# Patient Record
Sex: FEMALE | Race: WHITE
[De-identification: names, ages, dates, MRNs, and addresses within clinical notes are randomized per-mention and may not be internally consistent; named-entity substitution may affect disease eponyms.]

---

## 2017-06-20 ENCOUNTER — HOSPITAL ENCOUNTER (OUTPATIENT)
Dept: HOSPITAL 74 - FRADUS-SUR | Age: 67
Discharge: HOME | End: 2017-06-20
Payer: COMMERCIAL

## 2017-06-20 DIAGNOSIS — D23.5: ICD-10-CM

## 2017-06-20 DIAGNOSIS — C77.3: Primary | ICD-10-CM

## 2017-06-20 PROCEDURE — BH47ZZZ ULTRASONOGRAPHY OF UPPER EXTREMITY: ICD-10-PCS | Performed by: SURGERY

## 2017-06-20 PROCEDURE — BH40ZZZ ULTRASONOGRAPHY OF RIGHT BREAST: ICD-10-PCS | Performed by: SURGERY

## 2017-06-20 PROCEDURE — A4648 IMPLANTABLE TISSUE MARKER: HCPCS

## 2017-06-20 PROCEDURE — 07B53ZX EXCISION OF RIGHT AXILLARY LYMPHATIC, PERCUTANEOUS APPROACH, DIAGNOSTIC: ICD-10-PCS | Performed by: SURGERY

## 2017-06-20 PROCEDURE — 0HBT0ZX EXCISION OF RIGHT BREAST, OPEN APPROACH, DIAGNOSTIC: ICD-10-PCS | Performed by: SURGERY

## 2017-06-20 NOTE — OP
DATE OF OPERATION:  06/20/2017

 

PREOPERATIVE DIAGNOSIS:  Right axillary adenopathy and right breast skin thickening

and discoloration.  

 

POSTOPERATIVE DIAGNOSIS:  Right axillary adenopathy and right breast skin thickening

and discoloration.  

 

PROCEDURE:  Right axillary node ultrasound guided core biopsy and right breast skin

punch biopsy, 5 mm.  

 

ATTENDING SURGEON:  Tasia Jaramillo M.D. 

 

ANESTHESIA:  Local.

 

ESTIMATED BLOOD LOSS:  Minimal.

 

COMPLICATIONS:  None.

 

DESCRIPTION OF PROCEDURE:  Patient was made aware of risks and benefits of the

procedure and consented.  She is placed in a supine position.  The right axilla was

approached first.  Under sterile conditions, with 1% lidocaine for local anesthesia,

a small nick was made in the skin.  Using a 13-gauge suction biopsy, the biopsy _____

lateral approach under ultrasound guidance.  Multiple cores were obtained and

submitted to pathology.  Likewise under ultrasound guidance, a clip was placed into

the biopsy region.  Well tolerated by patient.  Steri-Strip and a sterile bandage was

applied.  The right breast was then approached.  Under sterile conditions with 1%

lidocaine for local anesthesia, a 5-mm punch biopsy was performed and submitted to

pathology.  The incision was then closed with a horizontal mattress suture of 4-0

nylon.  Sterile dressing applied.  The patient tolerated the procedure well.  Will

contact her with the results.   

 

 

TASIA JARAMILLO M.D.

 

[id number]

DD: 06/20/2017 11:29

DT: 06/20/2017 22:59

Job #:  [id number]

## 2017-06-21 NOTE — PATH
Surgical Pathology Report



Patient Name:  ELINOR ROWLAND

Accession #:  

[facility]. Rec. #:  [id number]                                                        

   /Age/Gender:  1950 (Age: 66) / F

Account:  [id number]                                                          

             Location: [facility]

Taken:  2017

Received:  2017

Reported:  2017

Physicians:  GIANNI Mccann M.D.

  



Specimen(s) Received

A: RIGHT AXILLARY NODE CORE BIOPSY 

B: RIGHT BREAST SKIN PUNCH BIOPSY 





Clinical History

Palpable mass

Highly suspicious







Final Diagnosis

A. LYMPH NODE, RIGHT AXILLARY, NEEDLE CORE BIOPSY:  

FOCAL INVOLVEMENT BY POORLY DIFFERENTIATED METASTATIC CARCINOMA, APPROXIMATELY 2

MM IN GREATEST DIMENSION AS MEASURED ON THE SLIDE.



B. SKIN, RIGHT BREAST, PUNCH BIOPSY:  

DERMAL LYMPH-VASCULAR TUMOR EMBOLUS, MEASURING LESS THAN 1 MM IN GREATEST

DIMENSION AS MEASURED ON THE SLIDE.



Comment: This case was discussed with Dr. Sánchez on 2017.

Immunohistochemical stains are pending, and a report will follow.







***Electronically Signed***

Geovany Ivy M.D.



Addendum     

Reported: 2017



Addendum Diagnosis

Results of Estrogen Receptor (ER) and Progesterone Receptor (ND) studies

performed on block "B" at [facility] are as follows:

ER (clone [id number] mouse monoclonal antibody by [employer]):  0% nuclear staining

(Negative).

ND (clone16 mouse monoclonal antibody by [employer]) : 0% nuclear staining

(/Negative).



Results of Her2 (IHC) & Ki-67 and RODNEY-3 studies performed on block "B" at

[place], [place] ([id number]) are as follows:

Her2 IHC (EP3 from [employer], formerly known as [id number], using Bond Polymer Refine

detection kit): 1+ Negative

Ki-67: ~25% (Intermediate proliferative index)

RODNEY-3: Positive (consistent with breast origin).



Positive and negative controls (internal if applicable) show appropriate

results.

Formalin fixation and cold ischemic times are within current ASCO/CAP

recommendations for ER, ND and Her2 testing.







 Geovany Ivy M.D.  

 



Gross Description

A. Received in formalin labeled "right axillary lymph node," is a 2.2 x 2.1 x

0.3 cm aggregate of multiple tan-yellow, irregular to cylindrical portions of

fibroadipose tissue admixed with blood clot. The specimen is entirely submitted

in one cassette.



B. Received in formalin labeled "right breast skin punch," is a 0.4 cm in

diameter tan, circular skin punch biopsy excised to depth of 0.6 cm. The

epidermal surface is grossly unremarkable. The specimen is submitted in toto in

one cassette.



Total formalin fixation time: Approximately 6 hours.

## 2017-06-22 ENCOUNTER — HOSPITAL ENCOUNTER (OUTPATIENT)
Dept: HOSPITAL 74 - FMAMMOTONE | Age: 67
Discharge: HOME | End: 2017-06-22
Payer: COMMERCIAL

## 2017-06-22 ENCOUNTER — HOSPITAL ENCOUNTER (EMERGENCY)
Dept: HOSPITAL 74 - FER | Age: 67
Discharge: HOME | End: 2017-06-22
Payer: COMMERCIAL

## 2017-06-22 VITALS — BODY MASS INDEX: 27.6 KG/M2

## 2017-06-22 VITALS — HEART RATE: 76 BPM | DIASTOLIC BLOOD PRESSURE: 62 MMHG | SYSTOLIC BLOOD PRESSURE: 108 MMHG

## 2017-06-22 VITALS — TEMPERATURE: 98.1 F

## 2017-06-22 DIAGNOSIS — N64.1: ICD-10-CM

## 2017-06-22 DIAGNOSIS — R55: Primary | ICD-10-CM

## 2017-06-22 DIAGNOSIS — C50.311: Primary | ICD-10-CM

## 2017-06-22 DIAGNOSIS — N64.89: ICD-10-CM

## 2017-06-22 DIAGNOSIS — R92.1: ICD-10-CM

## 2017-06-22 DIAGNOSIS — D05.01: ICD-10-CM

## 2017-06-22 DIAGNOSIS — Z98.890: ICD-10-CM

## 2017-06-22 LAB
ALBUMIN SERPL-MCNC: 3.9 G/DL (ref 3.5–5)
ALP SERPL-CCNC: 87 U/L (ref 32–92)
ALT SERPL-CCNC: 14 U/L (ref 10–40)
ANION GAP SERPL CALC-SCNC: 11 MMOL/L (ref 8–16)
AST SERPL-CCNC: 19 U/L (ref 10–42)
BASOPHILS # BLD: 0.8 % (ref 0–2)
BILIRUB SERPL-MCNC: 0.3 MG/DL (ref 0.2–1)
CALCIUM SERPL-MCNC: 9.1 MG/DL (ref 8.4–10.2)
CK SERPL-CCNC: 67 IU/L (ref 26–140)
CO2 SERPL-SCNC: 23 MMOL/L (ref 22–28)
CREAT SERPL-MCNC: 0.6 MG/DL (ref 0.6–1.3)
DEPRECATED RDW RBC AUTO: 13.3 % (ref 11.6–15.6)
EOSINOPHIL # BLD: 3.7 % (ref 0–4.5)
GLUCOSE SERPL-MCNC: 115 MG/DL (ref 74–106)
MCH RBC QN AUTO: 29.9 PG (ref 25.7–33.7)
MCHC RBC AUTO-ENTMCNC: 33.9 G/DL (ref 32–36)
MCV RBC: 88.1 FL (ref 80–96)
NEUTROPHILS # BLD: 65.4 % (ref 42.8–82.8)
PLATELET # BLD AUTO: 278 K/MM3 (ref 134–434)
PMV BLD: 8.2 FL (ref 7.5–11.1)
PROT SERPL-MCNC: 7.2 G/DL (ref 6.4–8.3)
TROPONIN I SERPL-MCNC: < 0.03 NG/ML (ref 0.03–0.5)
WBC # BLD AUTO: 9.1 K/MM3 (ref 4–10.8)

## 2017-06-22 PROCEDURE — A4648 IMPLANTABLE TISSUE MARKER: HCPCS

## 2017-06-22 PROCEDURE — 0HBT3ZX EXCISION OF RIGHT BREAST, PERCUTANEOUS APPROACH, DIAGNOSTIC: ICD-10-PCS

## 2017-06-22 PROCEDURE — 3E0337Z INTRODUCTION OF ELECTROLYTIC AND WATER BALANCE SUBSTANCE INTO PERIPHERAL VEIN, PERCUTANEOUS APPROACH: ICD-10-PCS

## 2017-06-22 NOTE — PDOC
History of Present Illness





- General


Chief Complaint: Lightheaded


Stated Complaint: LIGHTHEADED


Time Seen by Provider: 06/22/17 12:05


History Source: Patient


Exam Limitations: No Limitations





- History of Present Illness


Initial Comments: 


06/22/17 12:08


65 y/o female had a stereotactic biopsy of the breast today that bleed a little 

and when she left the office she became light headed and dizzy. Rapid response 

team was called. She never fell or syncopized. Denies chest pain or SOB. No 

fever or chills. No numbness or headaches or blurred vision. Feeling better in 

the ER.


Did not eat anything all day due to surgical procedure.


Presenting Symptoms: Dizziness, Near-Syncope


Timing/Duration: reports: resolved prior to arrival


Severity/Quality: reports: mild


Location: denies: substernal, central, epigastric, shoulder, back, abdomen, 

other


Chest Pain Radiation: denies: no radiation


Activities at Onset: denies: none, exertion, emotional upset, rest, sleep, no 

specific activity, eating, working, sexual intercourse, other





Past History





- Past Medical History


Allergies/Adverse Reactions: 


 Allergies











Allergy/AdvReac Type Severity Reaction Status Date / Time


 


No Known Allergies Allergy   Verified 06/22/17 12:18











Home Medications: 


Ambulatory Orders





Multivitamins [Tab-A-Vit -] 1 tab PO DAILY 06/22/17 


Naproxen Sodium [Aleve] 220 mg PO ASDIR PRN 06/22/17 











**Review of Systems





- Review of Systems


Able to Perform ROS?: Yes


Is the patient limited English proficient: No


Constitutional: Yes: Weakness.  No: Chills, Fever


HEENTM: No: Blurred Vision, Double Vision


Respiratory: No: Cough, Shortness of Breath


Cardiac (ROS): No: Chest Pain, Irregular Heart Rate


ABD/GI: No: Nausea, Vomiting


Musculoskeletal: No: Back Pain


All Other Systems: Reviewed and Negative





*Physical Exam





- Vital Signs


 Last Vital Signs











Temp Pulse Resp BP Pulse Ox


 


 98.1 F   79   18   105/65   98 


 


 06/22/17 12:00  06/22/17 12:46  06/22/17 12:46  06/22/17 12:46  06/22/17 12:46














- Physical Exam


General Appearance: Yes: Appropriately Dressed.  No: Apparent Distress


HEENT: positive: EOMI, RODRIGUEZ, Normal ENT Inspection


Neck: positive: Trachea midline, Normal Thyroid, Supple


Respiratory/Chest: positive: Lungs Clear, Normal Breath Sounds, Other (right 

breast bandaged, no active bleeding).  negative: Chest Tender, Respiratory 

Distress


Cardiovascular: positive: Regular Rhythm, Regular Rate, S1, S2.  negative: Edema

, JVD, Murmur


Vascular Pulses: Femoral (R): 4+, Femoral (L): 4+, Carotid (R): 4+, Carotid (L)

: 4+, Dorsalis-Pedis (R): 4+, Doralis-Pedis (L): 4+


Gastrointestinal/Abdominal: positive: Normal Bowel Sounds, Flat, Soft.  negative

: Tender, Organomegaly, Pulsatile Mass


Lymphatic: negative: Adenopathy, Tenderness, Other


Musculoskeletal: positive: Normal Inspection.  negative: CVA Tenderness


Extremity: positive: Normal Capillary Refill, Normal Inspection, Normal Range 

of Motion


Integumentary: positive: Normal Color, Dry, Warm


Neurologic: positive: CNs II-XII NML intact, Fully Oriented, Alert, Normal Mood/

Affect (strength 5+/5 b/l in UE and LE, no focal deficits noted), Normal 

Response, Motor Strength 5/5





**Heart Score/ECG Review





- History


History: Slightly suspicious





- Electrocardiogram


EKG: Normal





- Age


Age: 45-65





- Risk Factors


Risk Factors Heart Score: No Hx Hypercholesterolemia, No Hx Hypertension, No Hx 

Diabetes, No Smoking History, No Positive family hx of cardiac disease, No Hx 

Obesity


Based on the list above the patient has:: No risk factors known





- Troponin


Troponin: </= normal limit





- Score


Heart Score - Total: 1





- ECG Intrepretation


Rhythm: Regular Rhythm


Comment:: 





06/22/17 12:15


HR 69 no STEMI





- Axis


Axis: Normal





- ST and T


Non Specific ST-T Wave changes: No





- ECG Impressions


Normal ECG: Yes





ED Treatment Course





- LABORATORY


CBC & Chemistry Diagram: 


 06/22/17 12:15





 06/22/17 12:12





- ADDITIONAL ORDERS


Additional order review: 


 Laboratory  Results











  06/22/17 06/22/17





  12:12 12:12


 


Sodium   138


 


Potassium   3.8


 


Chloride   104


 


Carbon Dioxide   23


 


Anion Gap   11


 


BUN   17


 


Creatinine   0.6


 


Creat Clearance w eGFR   > 60


 


Random Glucose   115 H


 


Calcium   9.1


 


Total Bilirubin   0.3


 


AST   19


 


ALT   14


 


Alkaline Phosphatase   87


 


Troponin I  < 0.03 L 


 


Total Protein   7.2


 


Albumin   3.9








 











  06/22/17





  12:15


 


RBC  4.36


 


MCV  88.1


 


MCHC  33.9


 


RDW  13.3


 


MPV  8.2


 


Neutrophils %  65.4


 


Lymphocytes %  22.1


 


Monocytes %  8.0


 


Eosinophils %  3.7


 


Basophils %  0.8














Progress Note





- Progress Note


Progress Note: 


Pt appears to have a near syncopal episode, now resolved with fluids.


Pt is feeling much better, vitals stable


Will discharge home





*DC/Admit/Observation/Transfer


Diagnosis at time of Disposition: 


 Vasovagal near syncope





- Discharge Dispostion


Disposition: HOME


Condition at time of disposition: Good


Admit: No





- Patient Instructions


Printed Discharge Instructions:  DI for Syncope in Adults (Fainting)


Additional Instructions: 


Fluids, rest, Tylenol


If worsen return to ER

## 2017-06-23 NOTE — PATH
Surgical Pathology Report



Patient Name:  ELINOR ROWLAND

Accession #:  

[facility]. Rec. #:  [id number]                                                        

   /Age/Gender:  1950 (Age: 66) / F

Account:  [id number]                                                          

             Location: [facility]

Taken:  2017

Received:  2017

Reported:  2017

Physicians:  GIANNI Steve M.D.

  



Specimen(s) Received

A: RIGHT BREAST CORE BIOPSY WITH CALCIFICATIONS 

B: RIGHT BREAST CORE BIOPSY WITHOUT CALCIFICATIONS 





Clinical History

Microcalcifications, suspicious







Final Diagnosis

A, B. BREAST, RIGHT, WITH AND WITHOUT CALCIFICATIONS, STEREOTACTIC CORE BIOPSY

INVASIVE MAMMARY CARCINOMA, NUCLEAR GRADE 2, MOST CONSISTENT WITH LOBULAR

PHENOTYPE (SEE COMMENT).

LOBULAR CARCINOMA IN SITU (LCIS), NUCLEAR GRADE 2, WITH FOCAL CENTRAL NECROSIS

AND CALCIFICATIONS.

FOCI OF LYMPHOVASCULAR INVASION IDENTIFIED.



Comment: The greatest extent of invasive carcinoma in one core is 0.5 cm. 

Immunohistochemical stain for E-cadherin performed and interpreted at [facility] on block A1 shows the following: invasive and in-situ

carcinoma cells show negative membranous staining and weak cytoplasmic

reactivity with E-cadherin, the pattern most compatible with lobular phenotype. 

Additional immunohistochemical stains for [id number] and CK-HMW are pending;

results will be reported in an addendum. 



Results of Estrogen Receptor (ER) and Progesterone Receptor (MS) studies

performed on block A1 at [facility] are as follows (in

invasive carcinoma):

ER (clone 6F11 mouse monoclonal antibody by [employer]): 0% nuclear staining

(Negative).

MS (clone16 mouse monoclonal antibody by [employer]): 0% nuclear staining (Negative).



Results of Her2 and Ki67 studies will be reported separately in an addendum.



Positive and negative controls (internal if applicable) show appropriate

results.

Formalin fixation and cold ischemic times are within current ASCO/CAP

recommendations for ER, MS and Her2 testing.





***Electronically Signed***

Alexander Finkelstein, M.D.



Addendum     

Reported: 2017



Addendum Diagnosis

Additional immunohistochemical stains for [id number] and CK-HMW performed at

[place], [place] ([id number]) and interpreted as [facility] show the following: the cells of invasive and in situ carcinoma show

primarily cytoplasmic staining with [id number] and are positive for CK-HMW.

The results are supportive of lobular phenotype.



Results of Her2 (IHC) & Ki-67 studies performed on block A1 at [place], [place] ([id number]) are as follows:

Her2 IHC (EP3 from [employer], formerly known as [id number], using Bond Polymer Refine

detection kit): 1+ (Negative)

Ki-67: ~20% (Intermediate proliferation index)



Positive and negative controls (internal if applicable) show appropriate

results.





 Alexander Finkelstein, M.D.  

 



Gross Description

A.  Received in formalin, labeled "right breast with calcifications" are 7

tan-yellow, cylindrical portions of fibroadipose tissue ranging from 0.9-3.0 cm

in length and averaging 0.2 cm. in diameter. The specimen is submitted in toto

in one cassette. 



B. Received in formalin labeled "right breast without calcifications" is a 1.7 x

0.9 x 0.3 cm aggregate of multiple tan-yellow, air radial to cylindrical

portions of fibroadipose tissue. The formalin is filtered and the specimen is

entirely submitted in one cassette.



Time to formalin fixation: 5 minutes

Total formalin fixation time: Approximately 6 hours.

## 2017-06-23 NOTE — EKG
Test Reason : 

Blood Pressure : ***/*** mmHG

Vent. Rate : 069 BPM     Atrial Rate : 069 BPM

   P-R Int : 152 ms          QRS Dur : 078 ms

    QT Int : 412 ms       P-R-T Axes : 065 045 055 degrees

   QTc Int : 441 ms

 

NORMAL SINUS RHYTHM

POSSIBLE LEFT ATRIAL ENLARGEMENT

WHEN COMPARED WITH ECG OF 17-AUG-1998 08:53,

NO SIGNIFICANT CHANGE WAS FOUND

Confirmed by MD PINTO MARJORY ([id number]) on 6/23/2017 6:18:06 PM

 

Referred By: BRADLY COLÓN           Confirmed By:CYNTHIA PINTO MD

## 2017-08-30 ENCOUNTER — HOSPITAL ENCOUNTER (OUTPATIENT)
Dept: HOSPITAL 74 - JRADIR | Age: 67
Discharge: HOME | End: 2017-08-30
Payer: COMMERCIAL

## 2017-08-30 VITALS — DIASTOLIC BLOOD PRESSURE: 70 MMHG | HEART RATE: 80 BPM | SYSTOLIC BLOOD PRESSURE: 126 MMHG

## 2017-08-30 VITALS — BODY MASS INDEX: 27.6 KG/M2

## 2017-08-30 VITALS — TEMPERATURE: 98.6 F

## 2017-08-30 DIAGNOSIS — C50.919: Primary | ICD-10-CM

## 2017-08-30 LAB
BASOPHILS # BLD: 0.6 % (ref 0–2)
DEPRECATED RDW RBC AUTO: 14.3 % (ref 11.6–15.6)
EOSINOPHIL # BLD: 0 % (ref 0–4.5)
INR BLD: 1.16 (ref 0.82–1.09)
MCH RBC QN AUTO: 27.7 PG (ref 25.7–33.7)
MCHC RBC AUTO-ENTMCNC: 32.4 G/DL (ref 32–36)
MCV RBC: 85.6 FL (ref 80–96)
NEUTROPHILS # BLD: 81.7 % (ref 42.8–82.8)
PLATELET # BLD AUTO: 326 K/MM3 (ref 134–434)
PMV BLD: 7.3 FL (ref 7.5–11.1)
PT PNL PPP: 12.8 SEC (ref 9.98–11.88)
WBC # BLD AUTO: 9 K/MM3 (ref 4–10)

## 2017-08-30 PROCEDURE — 36561 INSERT TUNNELED CV CATH: CPT

## 2017-08-30 PROCEDURE — C1788 PORT, INDWELLING, IMP: HCPCS

## 2017-08-30 PROCEDURE — B518ZZA FLUOROSCOPY OF SUPERIOR VENA CAVA, GUIDANCE: ICD-10-PCS | Performed by: RADIOLOGY

## 2017-08-30 PROCEDURE — 02HV33Z INSERTION OF INFUSION DEVICE INTO SUPERIOR VENA CAVA, PERCUTANEOUS APPROACH: ICD-10-PCS | Performed by: RADIOLOGY

## 2017-08-30 PROCEDURE — 77001 FLUOROGUIDE FOR VEIN DEVICE: CPT

## 2017-08-31 ENCOUNTER — HOSPITAL ENCOUNTER (OUTPATIENT)
Dept: HOSPITAL 74 - JONCCHEMO | Age: 67
Discharge: HOME | End: 2017-08-31
Attending: INTERNAL MEDICINE
Payer: COMMERCIAL

## 2017-08-31 VITALS — HEART RATE: 82 BPM | DIASTOLIC BLOOD PRESSURE: 61 MMHG | SYSTOLIC BLOOD PRESSURE: 114 MMHG

## 2017-08-31 VITALS — TEMPERATURE: 98.4 F

## 2017-08-31 DIAGNOSIS — C50.211: ICD-10-CM

## 2017-08-31 DIAGNOSIS — Z51.11: Primary | ICD-10-CM

## 2017-08-31 LAB
ALBUMIN SERPL-MCNC: 3.9 G/DL (ref 3.4–5)
ALP SERPL-CCNC: 122 U/L (ref 45–117)
ALT SERPL-CCNC: 20 U/L (ref 12–78)
ANION GAP SERPL CALC-SCNC: 12 MMOL/L (ref 8–16)
AST SERPL-CCNC: 11 U/L (ref 15–37)
BASOPHILS # BLD: 0.2 % (ref 0–2)
BILIRUB CONJ SERPL-MCNC: < 0.2 MG/DL (ref 0–0.2)
BILIRUB SERPL-MCNC: 0.3 MG/DL (ref 0.2–1)
CALCIUM SERPL-MCNC: 9.8 MG/DL (ref 8.5–10.1)
CO2 SERPL-SCNC: 25 MMOL/L (ref 21–32)
CREAT SERPL-MCNC: 0.5 MG/DL (ref 0.55–1.02)
DEPRECATED RDW RBC AUTO: 14.3 % (ref 11.6–15.6)
EOSINOPHIL # BLD: 0 % (ref 0–4.5)
GLUCOSE SERPL-MCNC: 155 MG/DL (ref 74–106)
MAGNESIUM SERPL-MCNC: 1.7 MG/DL (ref 1.8–2.4)
MCH RBC QN AUTO: 27.5 PG (ref 25.7–33.7)
MCHC RBC AUTO-ENTMCNC: 32.2 G/DL (ref 32–36)
MCV RBC: 85.6 FL (ref 80–96)
NEUTROPHILS # BLD: 88.5 % (ref 42.8–82.8)
PLATELET # BLD AUTO: 316 K/MM3 (ref 134–434)
PMV BLD: 7.4 FL (ref 7.5–11.1)
PROT SERPL-MCNC: 8 G/DL (ref 6.4–8.2)
WBC # BLD AUTO: 5.9 K/MM3 (ref 4–10)

## 2017-08-31 PROCEDURE — 3E04305 INTRODUCTION OF OTHER ANTINEOPLASTIC INTO CENTRAL VEIN, PERCUTANEOUS APPROACH: ICD-10-PCS | Performed by: INTERNAL MEDICINE

## 2017-08-31 PROCEDURE — 96367 TX/PROPH/DG ADDL SEQ IV INF: CPT

## 2017-08-31 PROCEDURE — 96417 CHEMO IV INFUS EACH ADDL SEQ: CPT

## 2017-08-31 PROCEDURE — 96413 CHEMO IV INFUSION 1 HR: CPT

## 2017-08-31 PROCEDURE — 96375 TX/PRO/DX INJ NEW DRUG ADDON: CPT

## 2017-08-31 PROCEDURE — 3E043GC INTRODUCTION OF OTHER THERAPEUTIC SUBSTANCE INTO CENTRAL VEIN, PERCUTANEOUS APPROACH: ICD-10-PCS | Performed by: INTERNAL MEDICINE

## 2017-09-07 ENCOUNTER — HOSPITAL ENCOUNTER (OUTPATIENT)
Dept: HOSPITAL 74 - JONCCHEMO | Age: 67
Discharge: HOME | End: 2017-09-07
Attending: INTERNAL MEDICINE
Payer: COMMERCIAL

## 2017-09-07 VITALS — DIASTOLIC BLOOD PRESSURE: 74 MMHG | HEART RATE: 87 BPM | SYSTOLIC BLOOD PRESSURE: 131 MMHG

## 2017-09-07 VITALS — TEMPERATURE: 98.4 F

## 2017-09-07 DIAGNOSIS — C50.211: ICD-10-CM

## 2017-09-07 DIAGNOSIS — Z51.11: Primary | ICD-10-CM

## 2017-09-07 LAB
BASOPHILS # BLD: 0.3 % (ref 0–2)
DEPRECATED RDW RBC AUTO: 14.3 % (ref 11.6–15.6)
EOSINOPHIL # BLD: 0.2 % (ref 0–4.5)
MCH RBC QN AUTO: 27.2 PG (ref 25.7–33.7)
MCHC RBC AUTO-ENTMCNC: 32.6 G/DL (ref 32–36)
MCV RBC: 83.6 FL (ref 80–96)
NEUTROPHILS # BLD: 89.3 % (ref 42.8–82.8)
PLATELET # BLD AUTO: 184 K/MM3 (ref 134–434)
PMV BLD: 6.8 FL (ref 7.5–11.1)
WBC # BLD AUTO: 4.5 K/MM3 (ref 4–10)

## 2017-09-21 ENCOUNTER — HOSPITAL ENCOUNTER (OUTPATIENT)
Dept: HOSPITAL 74 - JONCCHEMO | Age: 67
Discharge: HOME | End: 2017-09-21
Attending: INTERNAL MEDICINE
Payer: COMMERCIAL

## 2017-09-21 VITALS — SYSTOLIC BLOOD PRESSURE: 133 MMHG | HEART RATE: 93 BPM | DIASTOLIC BLOOD PRESSURE: 83 MMHG

## 2017-09-21 VITALS — TEMPERATURE: 98.3 F

## 2017-09-21 DIAGNOSIS — Z51.11: Primary | ICD-10-CM

## 2017-09-21 DIAGNOSIS — C50.211: ICD-10-CM

## 2017-09-21 LAB
ALBUMIN SERPL-MCNC: 3.7 G/DL (ref 3.4–5)
ALP SERPL-CCNC: 149 U/L (ref 45–117)
ALT SERPL-CCNC: 26 U/L (ref 12–78)
ANION GAP SERPL CALC-SCNC: 11 MMOL/L (ref 8–16)
AST SERPL-CCNC: 18 U/L (ref 15–37)
BASOPHILS # BLD: 0.4 % (ref 0–2)
BILIRUB CONJ SERPL-MCNC: < 0.1 MG/DL (ref 0–0.2)
BILIRUB SERPL-MCNC: 0.2 MG/DL (ref 0.2–1)
CALCIUM SERPL-MCNC: 8.7 MG/DL (ref 8.5–10.1)
CO2 SERPL-SCNC: 22 MMOL/L (ref 21–32)
CREAT SERPL-MCNC: 0.5 MG/DL (ref 0.55–1.02)
DEPRECATED RDW RBC AUTO: 16 % (ref 11.6–15.6)
EOSINOPHIL # BLD: 0.1 % (ref 0–4.5)
GLUCOSE SERPL-MCNC: 163 MG/DL (ref 74–106)
MAGNESIUM SERPL-MCNC: 1.9 MG/DL (ref 1.8–2.4)
MCH RBC QN AUTO: 27.2 PG (ref 25.7–33.7)
MCHC RBC AUTO-ENTMCNC: 32.3 G/DL (ref 32–36)
MCV RBC: 84.4 FL (ref 80–96)
NEUTROPHILS # BLD: 81.4 % (ref 42.8–82.8)
PLATELET # BLD AUTO: 249 K/MM3 (ref 134–434)
PMV BLD: 6.3 FL (ref 7.5–11.1)
PROT SERPL-MCNC: 7.5 G/DL (ref 6.4–8.2)
WBC # BLD AUTO: 3.2 K/MM3 (ref 4–10)

## 2017-09-28 ENCOUNTER — HOSPITAL ENCOUNTER (OUTPATIENT)
Dept: HOSPITAL 74 - JONCCHEMO | Age: 67
Discharge: HOME | End: 2017-09-28
Attending: INTERNAL MEDICINE
Payer: COMMERCIAL

## 2017-09-28 VITALS — HEART RATE: 68 BPM | SYSTOLIC BLOOD PRESSURE: 126 MMHG | DIASTOLIC BLOOD PRESSURE: 77 MMHG

## 2017-09-28 VITALS — TEMPERATURE: 97.5 F

## 2017-09-28 DIAGNOSIS — C50.211: ICD-10-CM

## 2017-09-28 DIAGNOSIS — Z51.11: Primary | ICD-10-CM

## 2017-09-28 LAB
ALBUMIN SERPL-MCNC: 3.9 G/DL (ref 3.4–5)
ALP SERPL-CCNC: 163 U/L (ref 45–117)
ALT SERPL-CCNC: 36 U/L (ref 12–78)
ANION GAP SERPL CALC-SCNC: 10 MMOL/L (ref 8–16)
AST SERPL-CCNC: 30 U/L (ref 15–37)
BASOPHILS # BLD: 0.3 % (ref 0–2)
BILIRUB CONJ SERPL-MCNC: < 0.1 MG/DL (ref 0–0.2)
BILIRUB SERPL-MCNC: 0.3 MG/DL (ref 0.2–1)
CALCIUM SERPL-MCNC: 8.9 MG/DL (ref 8.5–10.1)
CO2 SERPL-SCNC: 24 MMOL/L (ref 21–32)
CREAT SERPL-MCNC: 0.7 MG/DL (ref 0.55–1.02)
DEPRECATED RDW RBC AUTO: 15.8 % (ref 11.6–15.6)
EOSINOPHIL # BLD: 0 % (ref 0–4.5)
GLUCOSE SERPL-MCNC: 160 MG/DL (ref 74–106)
MAGNESIUM SERPL-MCNC: 1.8 MG/DL (ref 1.8–2.4)
MCH RBC QN AUTO: 27 PG (ref 25.7–33.7)
MCHC RBC AUTO-ENTMCNC: 32.5 G/DL (ref 32–36)
MCV RBC: 83 FL (ref 80–96)
NEUTROPHILS # BLD: 83.2 % (ref 42.8–82.8)
PLATELET # BLD AUTO: 231 K/MM3 (ref 134–434)
PMV BLD: 6.4 FL (ref 7.5–11.1)
PROT SERPL-MCNC: 8 G/DL (ref 6.4–8.2)
WBC # BLD AUTO: 2.5 K/MM3 (ref 4–10)

## 2017-09-29 ENCOUNTER — HOSPITAL ENCOUNTER (OUTPATIENT)
Dept: HOSPITAL 74 - JONCCHEMO | Age: 67
Discharge: HOME | End: 2017-09-29
Attending: INTERNAL MEDICINE
Payer: COMMERCIAL

## 2017-09-29 VITALS — HEART RATE: 70 BPM | SYSTOLIC BLOOD PRESSURE: 126 MMHG | TEMPERATURE: 98.2 F | DIASTOLIC BLOOD PRESSURE: 70 MMHG

## 2017-09-29 DIAGNOSIS — C50.211: Primary | ICD-10-CM

## 2017-09-29 PROCEDURE — 3E013GC INTRODUCTION OF OTHER THERAPEUTIC SUBSTANCE INTO SUBCUTANEOUS TISSUE, PERCUTANEOUS APPROACH: ICD-10-PCS | Performed by: INTERNAL MEDICINE

## 2017-10-12 ENCOUNTER — HOSPITAL ENCOUNTER (OUTPATIENT)
Dept: HOSPITAL 74 - JONCCHEMO | Age: 67
Discharge: HOME | End: 2017-10-12
Attending: INTERNAL MEDICINE
Payer: COMMERCIAL

## 2017-10-12 VITALS — DIASTOLIC BLOOD PRESSURE: 55 MMHG | SYSTOLIC BLOOD PRESSURE: 122 MMHG | HEART RATE: 87 BPM | TEMPERATURE: 98.4 F

## 2017-10-12 DIAGNOSIS — Z51.11: Primary | ICD-10-CM

## 2017-10-12 DIAGNOSIS — C50.211: ICD-10-CM

## 2017-10-12 LAB
ALBUMIN SERPL-MCNC: 3.9 G/DL (ref 3.4–5)
ALP SERPL-CCNC: 151 U/L (ref 45–117)
ALT SERPL-CCNC: 26 U/L (ref 12–78)
ANION GAP SERPL CALC-SCNC: 9 MMOL/L (ref 8–16)
AST SERPL-CCNC: 20 U/L (ref 15–37)
BASOPHILS # BLD: 0.4 % (ref 0–2)
BILIRUB CONJ SERPL-MCNC: < 0.1 MG/DL (ref 0–0.2)
BILIRUB SERPL-MCNC: 0.3 MG/DL (ref 0.2–1)
CALCIUM SERPL-MCNC: 8.9 MG/DL (ref 8.5–10.1)
CO2 SERPL-SCNC: 25 MMOL/L (ref 21–32)
CREAT SERPL-MCNC: 0.5 MG/DL (ref 0.55–1.02)
DEPRECATED RDW RBC AUTO: 18.3 % (ref 11.6–15.6)
EOSINOPHIL # BLD: 0 % (ref 0–4.5)
GLUCOSE SERPL-MCNC: 159 MG/DL (ref 74–106)
MAGNESIUM SERPL-MCNC: 1.9 MG/DL (ref 1.8–2.4)
MCH RBC QN AUTO: 27.1 PG (ref 25.7–33.7)
MCHC RBC AUTO-ENTMCNC: 32 G/DL (ref 32–36)
MCV RBC: 84.5 FL (ref 80–96)
NEUTROPHILS # BLD: 91.6 % (ref 42.8–82.8)
PLATELET # BLD AUTO: 215 K/MM3 (ref 134–434)
PMV BLD: 6.8 FL (ref 7.5–11.1)
PROT SERPL-MCNC: 7.9 G/DL (ref 6.4–8.2)
WBC # BLD AUTO: 7.1 K/MM3 (ref 4–10)

## 2017-10-19 ENCOUNTER — HOSPITAL ENCOUNTER (OUTPATIENT)
Dept: HOSPITAL 74 - JONCCHEMO | Age: 67
Discharge: HOME | End: 2017-10-19
Attending: INTERNAL MEDICINE
Payer: COMMERCIAL

## 2017-10-19 VITALS — SYSTOLIC BLOOD PRESSURE: 125 MMHG | DIASTOLIC BLOOD PRESSURE: 75 MMHG

## 2017-10-19 VITALS — HEART RATE: 86 BPM | TEMPERATURE: 98.4 F

## 2017-10-19 DIAGNOSIS — C50.211: ICD-10-CM

## 2017-10-19 DIAGNOSIS — Z51.11: Primary | ICD-10-CM

## 2017-10-19 LAB
ALBUMIN SERPL-MCNC: 4 G/DL (ref 3.4–5)
ALP SERPL-CCNC: 135 U/L (ref 45–117)
ALT SERPL-CCNC: 35 U/L (ref 12–78)
ANION GAP SERPL CALC-SCNC: 10 MMOL/L (ref 8–16)
AST SERPL-CCNC: 28 U/L (ref 15–37)
BASOPHILS # BLD: 0.1 % (ref 0–2)
BILIRUB CONJ SERPL-MCNC: 0.1 MG/DL (ref 0–0.2)
BILIRUB SERPL-MCNC: 0.4 MG/DL (ref 0.2–1)
CALCIUM SERPL-MCNC: 9.1 MG/DL (ref 8.5–10.1)
CO2 SERPL-SCNC: 23 MMOL/L (ref 21–32)
CREAT SERPL-MCNC: 0.7 MG/DL (ref 0.55–1.02)
DEPRECATED RDW RBC AUTO: 18.1 % (ref 11.6–15.6)
EOSINOPHIL # BLD: 0 % (ref 0–4.5)
GLUCOSE SERPL-MCNC: 162 MG/DL (ref 74–106)
MAGNESIUM SERPL-MCNC: 1.9 MG/DL (ref 1.8–2.4)
MCH RBC QN AUTO: 27.1 PG (ref 25.7–33.7)
MCHC RBC AUTO-ENTMCNC: 33.1 G/DL (ref 32–36)
MCV RBC: 81.8 FL (ref 80–96)
NEUTROPHILS # BLD: 92.5 % (ref 42.8–82.8)
PLATELET # BLD AUTO: 334 K/MM3 (ref 134–434)
PMV BLD: 6.6 FL (ref 7.5–11.1)
PROT SERPL-MCNC: 8 G/DL (ref 6.4–8.2)
WBC # BLD AUTO: 3.1 K/MM3 (ref 4–10)

## 2017-10-20 ENCOUNTER — HOSPITAL ENCOUNTER (OUTPATIENT)
Dept: HOSPITAL 74 - JONCCHEMO | Age: 67
Discharge: HOME | End: 2017-10-20
Attending: INTERNAL MEDICINE
Payer: COMMERCIAL

## 2017-10-20 VITALS — DIASTOLIC BLOOD PRESSURE: 71 MMHG | TEMPERATURE: 98 F | SYSTOLIC BLOOD PRESSURE: 132 MMHG | HEART RATE: 62 BPM

## 2017-10-20 DIAGNOSIS — C50.211: Primary | ICD-10-CM

## 2017-10-20 PROCEDURE — 3E013GC INTRODUCTION OF OTHER THERAPEUTIC SUBSTANCE INTO SUBCUTANEOUS TISSUE, PERCUTANEOUS APPROACH: ICD-10-PCS | Performed by: INTERNAL MEDICINE

## 2017-11-02 ENCOUNTER — HOSPITAL ENCOUNTER (OUTPATIENT)
Dept: HOSPITAL 74 - JONCCHEMO | Age: 67
Discharge: HOME | End: 2017-11-02
Attending: INTERNAL MEDICINE
Payer: COMMERCIAL

## 2017-11-02 VITALS — SYSTOLIC BLOOD PRESSURE: 122 MMHG | DIASTOLIC BLOOD PRESSURE: 74 MMHG | HEART RATE: 87 BPM

## 2017-11-02 VITALS — TEMPERATURE: 97.6 F

## 2017-11-02 DIAGNOSIS — C50.211: ICD-10-CM

## 2017-11-02 DIAGNOSIS — Z51.11: Primary | ICD-10-CM

## 2017-11-02 LAB
ALBUMIN SERPL-MCNC: 3.7 G/DL (ref 3.4–5)
ALP SERPL-CCNC: 119 U/L (ref 45–117)
ALT SERPL-CCNC: 23 U/L (ref 12–78)
ANION GAP SERPL CALC-SCNC: 10 MMOL/L (ref 8–16)
AST SERPL-CCNC: 11 U/L (ref 15–37)
BILIRUB CONJ SERPL-MCNC: < 0.2 MG/DL (ref 0–0.2)
BILIRUB SERPL-MCNC: 0.6 MG/DL (ref 0.2–1)
CALCIUM SERPL-MCNC: 8.5 MG/DL (ref 8.5–10.1)
CO2 SERPL-SCNC: 22 MMOL/L (ref 21–32)
CREAT SERPL-MCNC: 0.5 MG/DL (ref 0.55–1.02)
DEPRECATED RDW RBC AUTO: 20.2 % (ref 11.6–15.6)
GLUCOSE SERPL-MCNC: 144 MG/DL (ref 74–106)
MAGNESIUM SERPL-MCNC: 1.6 MG/DL (ref 1.8–2.4)
MCH RBC QN AUTO: 27.9 PG (ref 25.7–33.7)
MCHC RBC AUTO-ENTMCNC: 33.3 G/DL (ref 32–36)
MCV RBC: 83.7 FL (ref 80–96)
METAMYELOCYTES NFR BLD: 1 % (ref 0–2)
PLATELET # BLD AUTO: 103 K/MM3 (ref 134–434)
PLATELET # BLD EST: (no result) 10*3/UL
PMV BLD: 7.1 FL (ref 7.5–11.1)
PROT SERPL-MCNC: 7.2 G/DL (ref 6.4–8.2)
TOTAL CELLS COUNTED BLD: 100
WBC # BLD AUTO: 9.3 K/MM3 (ref 4–10)

## 2017-11-22 ENCOUNTER — HOSPITAL ENCOUNTER (OUTPATIENT)
Dept: HOSPITAL 74 - JONCCHEMO | Age: 67
Discharge: HOME | End: 2017-11-22
Attending: INTERNAL MEDICINE
Payer: COMMERCIAL

## 2017-11-22 VITALS — DIASTOLIC BLOOD PRESSURE: 79 MMHG | SYSTOLIC BLOOD PRESSURE: 133 MMHG | HEART RATE: 96 BPM

## 2017-11-22 VITALS — TEMPERATURE: 97.9 F

## 2017-11-22 DIAGNOSIS — Z51.11: Primary | ICD-10-CM

## 2017-11-22 DIAGNOSIS — C50.211: ICD-10-CM

## 2017-11-22 LAB
ALBUMIN SERPL-MCNC: 3.9 G/DL (ref 3.4–5)
ALP SERPL-CCNC: 118 U/L (ref 45–117)
ALT SERPL-CCNC: 28 U/L (ref 12–78)
ANION GAP SERPL CALC-SCNC: 9 MMOL/L (ref 8–16)
ANISOCYTOSIS BLD QL: (no result)
AST SERPL-CCNC: 18 U/L (ref 15–37)
BASOPHILS # BLD: 0.1 % (ref 0–2)
BILIRUB CONJ SERPL-MCNC: < 0.2 MG/DL (ref 0–0.2)
BILIRUB SERPL-MCNC: 0.4 MG/DL (ref 0.2–1)
CALCIUM SERPL-MCNC: 8.6 MG/DL (ref 8.5–10.1)
CO2 SERPL-SCNC: 24 MMOL/L (ref 21–32)
CREAT SERPL-MCNC: 0.6 MG/DL (ref 0.55–1.02)
DEPRECATED RDW RBC AUTO: 30.1 % (ref 11.6–15.6)
EOSINOPHIL # BLD: 0 % (ref 0–4.5)
GLUCOSE SERPL-MCNC: 156 MG/DL (ref 74–106)
MACROCYTES BLD QL: (no result)
MAGNESIUM SERPL-MCNC: 1.7 MG/DL (ref 1.8–2.4)
MCH RBC QN AUTO: 28.4 PG (ref 25.7–33.7)
MCHC RBC AUTO-ENTMCNC: 31.6 G/DL (ref 32–36)
MCV RBC: 90 FL (ref 80–96)
MICROCYTES BLD QL SMEAR: (no result)
NEUTROPHILS # BLD: 95.4 % (ref 42.8–82.8)
PLATELET # BLD AUTO: 116 K/MM3 (ref 134–434)
PMV BLD: 7 FL (ref 7.5–11.1)
PROT SERPL-MCNC: 7.5 G/DL (ref 6.4–8.2)
WBC # BLD AUTO: 11.1 K/MM3 (ref 4–10)

## 2017-11-29 ENCOUNTER — HOSPITAL ENCOUNTER (OUTPATIENT)
Dept: HOSPITAL 74 - JONCCHEMO | Age: 67
Discharge: HOME | End: 2017-11-29
Attending: INTERNAL MEDICINE
Payer: COMMERCIAL

## 2017-11-29 VITALS — TEMPERATURE: 98.8 F

## 2017-11-29 VITALS — DIASTOLIC BLOOD PRESSURE: 74 MMHG | HEART RATE: 80 BPM | SYSTOLIC BLOOD PRESSURE: 133 MMHG

## 2017-11-29 DIAGNOSIS — Z51.11: Primary | ICD-10-CM

## 2017-11-29 DIAGNOSIS — C50.211: ICD-10-CM

## 2017-11-29 DIAGNOSIS — D70.1: ICD-10-CM

## 2017-11-29 DIAGNOSIS — D64.81: ICD-10-CM

## 2017-11-29 DIAGNOSIS — Z17.1: ICD-10-CM

## 2017-11-29 LAB
ALBUMIN SERPL-MCNC: 4.1 G/DL (ref 3.4–5)
ALP SERPL-CCNC: 104 U/L (ref 45–117)
ALT SERPL-CCNC: 39 U/L (ref 12–78)
ANION GAP SERPL CALC-SCNC: 12 MMOL/L (ref 8–16)
AST SERPL-CCNC: 25 U/L (ref 15–37)
BASOPHILS # BLD: 0.3 % (ref 0–2)
BILIRUB CONJ SERPL-MCNC: 0.2 MG/DL (ref 0–0.2)
BILIRUB SERPL-MCNC: 0.6 MG/DL (ref 0.2–1)
CALCIUM SERPL-MCNC: 8.4 MG/DL (ref 8.5–10.1)
CO2 SERPL-SCNC: 22 MMOL/L (ref 21–32)
CREAT SERPL-MCNC: 0.8 MG/DL (ref 0.55–1.02)
DEPRECATED RDW RBC AUTO: 28.2 % (ref 11.6–15.6)
EOSINOPHIL # BLD: 0 % (ref 0–4.5)
GLUCOSE SERPL-MCNC: 149 MG/DL (ref 74–106)
MAGNESIUM SERPL-MCNC: 1.8 MG/DL (ref 1.8–2.4)
MCH RBC QN AUTO: 30.4 PG (ref 25.7–33.7)
MCHC RBC AUTO-ENTMCNC: 33.7 G/DL (ref 32–36)
MCV RBC: 90.2 FL (ref 80–96)
NEUTROPHILS # BLD: 90 % (ref 42.8–82.8)
PLATELET # BLD AUTO: 180 K/MM3 (ref 134–434)
PMV BLD: 6.7 FL (ref 7.5–11.1)
PROT SERPL-MCNC: 7.6 G/DL (ref 6.4–8.2)
WBC # BLD AUTO: 1.9 K/MM3 (ref 4–10)

## 2017-11-29 PROCEDURE — 30233N1 TRANSFUSION OF NONAUTOLOGOUS RED BLOOD CELLS INTO PERIPHERAL VEIN, PERCUTANEOUS APPROACH: ICD-10-PCS | Performed by: INTERNAL MEDICINE

## 2017-11-29 PROCEDURE — P9038 RBC IRRADIATED: HCPCS

## 2017-11-29 PROCEDURE — 3E04305 INTRODUCTION OF OTHER ANTINEOPLASTIC INTO CENTRAL VEIN, PERCUTANEOUS APPROACH: ICD-10-PCS | Performed by: INTERNAL MEDICINE

## 2017-11-29 PROCEDURE — P9058 RBC, L/R, CMV-NEG, IRRAD: HCPCS

## 2017-11-30 ENCOUNTER — HOSPITAL ENCOUNTER (OUTPATIENT)
Dept: HOSPITAL 74 - JONCNONCHE | Age: 67
Discharge: HOME | End: 2017-11-30
Attending: INTERNAL MEDICINE
Payer: COMMERCIAL

## 2017-11-30 VITALS — HEART RATE: 78 BPM | DIASTOLIC BLOOD PRESSURE: 68 MMHG | SYSTOLIC BLOOD PRESSURE: 114 MMHG | TEMPERATURE: 98.8 F

## 2017-11-30 DIAGNOSIS — D70.1: ICD-10-CM

## 2017-11-30 DIAGNOSIS — C50.211: Primary | ICD-10-CM

## 2017-11-30 PROCEDURE — 3E013GC INTRODUCTION OF OTHER THERAPEUTIC SUBSTANCE INTO SUBCUTANEOUS TISSUE, PERCUTANEOUS APPROACH: ICD-10-PCS | Performed by: INTERNAL MEDICINE

## 2017-12-06 ENCOUNTER — HOSPITAL ENCOUNTER (OUTPATIENT)
Dept: HOSPITAL 74 - JONCBLOOD | Age: 67
LOS: 1 days | Discharge: HOME | End: 2017-12-07
Attending: INTERNAL MEDICINE
Payer: COMMERCIAL

## 2017-12-06 VITALS — BODY MASS INDEX: 29 KG/M2

## 2017-12-06 DIAGNOSIS — C50.211: Primary | ICD-10-CM

## 2017-12-06 DIAGNOSIS — D70.1: ICD-10-CM

## 2017-12-06 DIAGNOSIS — D69.59: ICD-10-CM

## 2017-12-06 LAB
ALBUMIN SERPL-MCNC: 3.5 G/DL (ref 3.4–5)
ALP SERPL-CCNC: 104 U/L (ref 45–117)
ALT SERPL-CCNC: 26 U/L (ref 12–78)
ANION GAP SERPL CALC-SCNC: 9 MMOL/L (ref 8–16)
AST SERPL-CCNC: 22 U/L (ref 15–37)
BILIRUB SERPL-MCNC: 0.4 MG/DL (ref 0.2–1)
CALCIUM SERPL-MCNC: 7.8 MG/DL (ref 8.5–10.1)
CO2 SERPL-SCNC: 27 MMOL/L (ref 21–32)
CREAT SERPL-MCNC: 0.4 MG/DL (ref 0.55–1.02)
DEPRECATED RDW RBC AUTO: 26.8 % (ref 11.6–15.6)
GLUCOSE SERPL-MCNC: 81 MG/DL (ref 74–106)
MCH RBC QN AUTO: 31.3 PG (ref 25.7–33.7)
MCHC RBC AUTO-ENTMCNC: 33.4 G/DL (ref 32–36)
MCV RBC: 93.9 FL (ref 80–96)
METAMYELOCYTES NFR BLD: 1 % (ref 0–2)
MYELOCYTES NFR BLD: 1 % (ref 0–2)
NEUTS BAND NFR BLD: 4 %
PLATELET # BLD AUTO: 21 K/MM3 (ref 134–434)
PLATELET BLD QL SMEAR: (no result)
PMV BLD: 7.2 FL (ref 7.5–11.1)
PROT SERPL-MCNC: 6.2 G/DL (ref 6.4–8.2)
TOTAL CELLS COUNTED BLD: 100
WBC # BLD AUTO: 10.7 K/MM3 (ref 4–10)

## 2017-12-06 PROCEDURE — 30233N1 TRANSFUSION OF NONAUTOLOGOUS RED BLOOD CELLS INTO PERIPHERAL VEIN, PERCUTANEOUS APPROACH: ICD-10-PCS | Performed by: INTERNAL MEDICINE

## 2017-12-06 PROCEDURE — 30233R1 TRANSFUSION OF NONAUTOLOGOUS PLATELETS INTO PERIPHERAL VEIN, PERCUTANEOUS APPROACH: ICD-10-PCS | Performed by: INTERNAL MEDICINE

## 2017-12-07 LAB
DEPRECATED RDW RBC AUTO: 23.2 % (ref 11.6–15.6)
MCH RBC QN AUTO: 30.9 PG (ref 25.7–33.7)
MCHC RBC AUTO-ENTMCNC: 33.8 G/DL (ref 32–36)
MCV RBC: 91.4 FL (ref 80–96)
PLATELET # BLD AUTO: 59 K/MM3 (ref 134–434)
PMV BLD: 8.6 FL (ref 7.5–11.1)
WBC # BLD AUTO: 15.9 K/MM3 (ref 4–10)

## 2017-12-14 ENCOUNTER — HOSPITAL ENCOUNTER (OUTPATIENT)
Dept: HOSPITAL 74 - JONCCHEMO | Age: 67
Discharge: HOME | End: 2017-12-14
Attending: INTERNAL MEDICINE
Payer: COMMERCIAL

## 2017-12-14 VITALS — DIASTOLIC BLOOD PRESSURE: 66 MMHG | SYSTOLIC BLOOD PRESSURE: 122 MMHG | TEMPERATURE: 98.3 F | HEART RATE: 104 BPM

## 2017-12-14 DIAGNOSIS — Z51.11: Primary | ICD-10-CM

## 2017-12-14 DIAGNOSIS — C50.211: ICD-10-CM

## 2017-12-14 DIAGNOSIS — Z17.1: ICD-10-CM

## 2017-12-14 LAB
ALBUMIN SERPL-MCNC: 4.1 G/DL (ref 3.4–5)
ALP SERPL-CCNC: 109 U/L (ref 45–117)
ALT SERPL-CCNC: 21 U/L (ref 12–78)
ANION GAP SERPL CALC-SCNC: 11 MMOL/L (ref 8–16)
AST SERPL-CCNC: 12 U/L (ref 15–37)
BASOPHILS # BLD: 0.3 % (ref 0–2)
BILIRUB CONJ SERPL-MCNC: 0.2 MG/DL (ref 0–0.2)
BILIRUB SERPL-MCNC: 0.6 MG/DL (ref 0.2–1)
CALCIUM SERPL-MCNC: 9.1 MG/DL (ref 8.5–10.1)
CO2 SERPL-SCNC: 23 MMOL/L (ref 21–32)
CREAT SERPL-MCNC: 0.6 MG/DL (ref 0.55–1.02)
DEPRECATED RDW RBC AUTO: 25.1 % (ref 11.6–15.6)
EOSINOPHIL # BLD: 0 % (ref 0–4.5)
GLUCOSE SERPL-MCNC: 152 MG/DL (ref 74–106)
MAGNESIUM SERPL-MCNC: 1.5 MG/DL (ref 1.8–2.4)
MCH RBC QN AUTO: 31.5 PG (ref 25.7–33.7)
MCHC RBC AUTO-ENTMCNC: 33.1 G/DL (ref 32–36)
MCV RBC: 95.3 FL (ref 80–96)
NEUTROPHILS # BLD: 92 % (ref 42.8–82.8)
PLATELET # BLD AUTO: 91 K/MM3 (ref 134–434)
PMV BLD: 7.4 FL (ref 7.5–11.1)
PROT SERPL-MCNC: 8.1 G/DL (ref 6.4–8.2)
WBC # BLD AUTO: 4.7 K/MM3 (ref 4–10)

## 2017-12-14 PROCEDURE — 3E043GC INTRODUCTION OF OTHER THERAPEUTIC SUBSTANCE INTO CENTRAL VEIN, PERCUTANEOUS APPROACH: ICD-10-PCS | Performed by: INTERNAL MEDICINE

## 2017-12-14 PROCEDURE — 3E04305 INTRODUCTION OF OTHER ANTINEOPLASTIC INTO CENTRAL VEIN, PERCUTANEOUS APPROACH: ICD-10-PCS | Performed by: INTERNAL MEDICINE

## 2017-12-21 ENCOUNTER — HOSPITAL ENCOUNTER (OUTPATIENT)
Dept: HOSPITAL 74 - JONCCHEMO | Age: 67
Discharge: HOME | End: 2017-12-21
Attending: INTERNAL MEDICINE
Payer: COMMERCIAL

## 2017-12-21 VITALS — SYSTOLIC BLOOD PRESSURE: 140 MMHG | DIASTOLIC BLOOD PRESSURE: 87 MMHG | TEMPERATURE: 97.4 F | HEART RATE: 102 BPM

## 2017-12-21 DIAGNOSIS — C50.211: Primary | ICD-10-CM

## 2017-12-21 DIAGNOSIS — D64.81: ICD-10-CM

## 2017-12-21 DIAGNOSIS — C79.51: ICD-10-CM

## 2017-12-21 LAB
ALBUMIN SERPL-MCNC: 4 G/DL (ref 3.4–5)
ALP SERPL-CCNC: 104 U/L (ref 45–117)
ALT SERPL-CCNC: 29 U/L (ref 12–78)
ANION GAP SERPL CALC-SCNC: 10 MMOL/L (ref 8–16)
AST SERPL-CCNC: 19 U/L (ref 15–37)
BASOPHILS # BLD: 0.2 % (ref 0–2)
BILIRUB CONJ SERPL-MCNC: 0.2 MG/DL (ref 0–0.2)
BILIRUB SERPL-MCNC: 0.5 MG/DL (ref 0.2–1)
CALCIUM SERPL-MCNC: 8.7 MG/DL (ref 8.5–10.1)
CO2 SERPL-SCNC: 24 MMOL/L (ref 21–32)
CREAT SERPL-MCNC: 0.7 MG/DL (ref 0.55–1.02)
DEPRECATED RDW RBC AUTO: 22 % (ref 11.6–15.6)
EOSINOPHIL # BLD: 0.1 % (ref 0–4.5)
GLUCOSE SERPL-MCNC: 171 MG/DL (ref 74–106)
MAGNESIUM SERPL-MCNC: 1.4 MG/DL (ref 1.8–2.4)
MCH RBC QN AUTO: 31.8 PG (ref 25.7–33.7)
MCHC RBC AUTO-ENTMCNC: 32.7 G/DL (ref 32–36)
MCV RBC: 97.4 FL (ref 80–96)
NEUTROPHILS # BLD: 82.9 % (ref 42.8–82.8)
PLATELET # BLD AUTO: 92 K/MM3 (ref 134–434)
PMV BLD: 6.4 FL (ref 7.5–11.1)
PROT SERPL-MCNC: 7.8 G/DL (ref 6.4–8.2)
WBC # BLD AUTO: 1.1 K/MM3 (ref 4–10)

## 2017-12-21 PROCEDURE — 3E013GC INTRODUCTION OF OTHER THERAPEUTIC SUBSTANCE INTO SUBCUTANEOUS TISSUE, PERCUTANEOUS APPROACH: ICD-10-PCS | Performed by: INTERNAL MEDICINE

## 2018-01-04 ENCOUNTER — HOSPITAL ENCOUNTER (OUTPATIENT)
Dept: HOSPITAL 74 - JONCCHEMO | Age: 68
Discharge: HOME | End: 2018-01-04
Attending: INTERNAL MEDICINE
Payer: COMMERCIAL

## 2018-01-04 VITALS — DIASTOLIC BLOOD PRESSURE: 64 MMHG | SYSTOLIC BLOOD PRESSURE: 130 MMHG | HEART RATE: 64 BPM

## 2018-01-04 VITALS — TEMPERATURE: 98.1 F

## 2018-01-04 DIAGNOSIS — Z51.11: Primary | ICD-10-CM

## 2018-01-04 DIAGNOSIS — C79.51: ICD-10-CM

## 2018-01-04 DIAGNOSIS — C50.211: ICD-10-CM

## 2018-01-04 DIAGNOSIS — D64.81: ICD-10-CM

## 2018-01-04 LAB
ALBUMIN SERPL-MCNC: 4.2 G/DL (ref 3.4–5)
ALP SERPL-CCNC: 107 U/L (ref 45–117)
ALT SERPL-CCNC: 21 U/L (ref 12–78)
ANION GAP SERPL CALC-SCNC: 12 MMOL/L (ref 8–16)
AST SERPL-CCNC: 10 U/L (ref 15–37)
BASOPHILS # BLD: 0.4 % (ref 0–2)
BILIRUB CONJ SERPL-MCNC: < 0.2 MG/DL (ref 0–0.2)
BILIRUB SERPL-MCNC: 0.5 MG/DL (ref 0.2–1)
BUN SERPL-MCNC: 16 MG/DL (ref 7–18)
CALCIUM SERPL-MCNC: 9.1 MG/DL (ref 8.5–10.1)
CHLORIDE SERPL-SCNC: 103 MMOL/L (ref 98–107)
CO2 SERPL-SCNC: 23 MMOL/L (ref 21–32)
CREAT SERPL-MCNC: 0.7 MG/DL (ref 0.55–1.02)
DEPRECATED RDW RBC AUTO: 25 % (ref 11.6–15.6)
EOSINOPHIL # BLD: 0 % (ref 0–4.5)
GLUCOSE SERPL-MCNC: 160 MG/DL (ref 74–106)
HCT VFR BLD CALC: 34.7 % (ref 32.4–45.2)
HGB BLD-MCNC: 11.2 GM/DL (ref 10.7–15.3)
LYMPHOCYTES # BLD: 3.8 % (ref 8–40)
MAGNESIUM SERPL-MCNC: 1.5 MG/DL (ref 1.8–2.4)
MCH RBC QN AUTO: 33 PG (ref 25.7–33.7)
MCHC RBC AUTO-ENTMCNC: 32.1 G/DL (ref 32–36)
MCV RBC: 102.8 FL (ref 80–96)
MONOCYTES # BLD AUTO: 0.7 % (ref 3.8–10.2)
NEUTROPHILS # BLD: 95.1 % (ref 42.8–82.8)
PLATELET # BLD AUTO: 127 K/MM3 (ref 134–434)
PMV BLD: 7.3 FL (ref 7.5–11.1)
POTASSIUM SERPLBLD-SCNC: 3.6 MMOL/L (ref 3.5–5.1)
PROT SERPL-MCNC: 8.2 G/DL (ref 6.4–8.2)
RBC # BLD AUTO: 3.38 M/MM3 (ref 3.6–5.2)
SODIUM SERPL-SCNC: 138 MMOL/L (ref 136–145)
WBC # BLD AUTO: 7 K/MM3 (ref 4–10)

## 2018-01-05 ENCOUNTER — HOSPITAL ENCOUNTER (OUTPATIENT)
Dept: HOSPITAL 74 - JONCNONCHE | Age: 68
Discharge: HOME | End: 2018-01-05
Attending: INTERNAL MEDICINE
Payer: COMMERCIAL

## 2018-01-05 VITALS — DIASTOLIC BLOOD PRESSURE: 80 MMHG | TEMPERATURE: 97.8 F | HEART RATE: 102 BPM | SYSTOLIC BLOOD PRESSURE: 134 MMHG

## 2018-01-05 DIAGNOSIS — C50.211: Primary | ICD-10-CM

## 2018-01-05 PROCEDURE — 3E013GC INTRODUCTION OF OTHER THERAPEUTIC SUBSTANCE INTO SUBCUTANEOUS TISSUE, PERCUTANEOUS APPROACH: ICD-10-PCS | Performed by: INTERNAL MEDICINE

## 2018-01-11 ENCOUNTER — HOSPITAL ENCOUNTER (OUTPATIENT)
Dept: HOSPITAL 74 - JONCCHEMO | Age: 68
Discharge: HOME | End: 2018-01-11
Attending: INTERNAL MEDICINE
Payer: COMMERCIAL

## 2018-01-11 VITALS — SYSTOLIC BLOOD PRESSURE: 138 MMHG | TEMPERATURE: 98.4 F | HEART RATE: 92 BPM | DIASTOLIC BLOOD PRESSURE: 84 MMHG

## 2018-01-11 DIAGNOSIS — C50.211: Primary | ICD-10-CM

## 2018-01-11 LAB
ALBUMIN SERPL-MCNC: 3.7 G/DL (ref 3.4–5)
ALP SERPL-CCNC: 101 U/L (ref 45–117)
ALT SERPL-CCNC: 30 U/L (ref 12–78)
ANION GAP SERPL CALC-SCNC: 8 MMOL/L (ref 8–16)
AST SERPL-CCNC: 19 U/L (ref 15–37)
BASOPHILS # BLD: 0.5 % (ref 0–2)
BILIRUB CONJ SERPL-MCNC: 0.2 MG/DL (ref 0–0.2)
BILIRUB SERPL-MCNC: 0.6 MG/DL (ref 0.2–1)
BUN SERPL-MCNC: 18 MG/DL (ref 7–18)
CALCIUM SERPL-MCNC: 8.9 MG/DL (ref 8.5–10.1)
CHLORIDE SERPL-SCNC: 104 MMOL/L (ref 98–107)
CO2 SERPL-SCNC: 26 MMOL/L (ref 21–32)
CREAT SERPL-MCNC: 0.5 MG/DL (ref 0.55–1.02)
DEPRECATED RDW RBC AUTO: 21.2 % (ref 11.6–15.6)
EOSINOPHIL # BLD: 0.1 % (ref 0–4.5)
GLUCOSE SERPL-MCNC: 140 MG/DL (ref 74–106)
HCT VFR BLD CALC: 29.4 % (ref 32.4–45.2)
HGB BLD-MCNC: 9.5 GM/DL (ref 10.7–15.3)
LYMPHOCYTES # BLD: 15.2 % (ref 8–40)
MAGNESIUM SERPL-MCNC: 1.8 MG/DL (ref 1.8–2.4)
MCH RBC QN AUTO: 33.6 PG (ref 25.7–33.7)
MCHC RBC AUTO-ENTMCNC: 32.4 G/DL (ref 32–36)
MCV RBC: 103.5 FL (ref 80–96)
MONOCYTES # BLD AUTO: 1.9 % (ref 3.8–10.2)
NEUTROPHILS # BLD: 82.3 % (ref 42.8–82.8)
PLATELET # BLD AUTO: 60 K/MM3 (ref 134–434)
PMV BLD: 7 FL (ref 7.5–11.1)
POTASSIUM SERPLBLD-SCNC: 4 MMOL/L (ref 3.5–5.1)
PROT SERPL-MCNC: 7.4 G/DL (ref 6.4–8.2)
RBC # BLD AUTO: 2.83 M/MM3 (ref 3.6–5.2)
SODIUM SERPL-SCNC: 138 MMOL/L (ref 136–145)
WBC # BLD AUTO: 1.4 K/MM3 (ref 4–10)

## 2018-01-11 PROCEDURE — 3E013BZ INTRODUCTION OF ANESTHETIC AGENT INTO SUBCUTANEOUS TISSUE, PERCUTANEOUS APPROACH: ICD-10-PCS | Performed by: INTERNAL MEDICINE

## 2018-01-25 ENCOUNTER — HOSPITAL ENCOUNTER (OUTPATIENT)
Dept: HOSPITAL 74 - JONCCHEMO | Age: 68
Discharge: HOME | End: 2018-01-25
Attending: INTERNAL MEDICINE
Payer: COMMERCIAL

## 2018-01-25 VITALS — TEMPERATURE: 97.4 F

## 2018-01-25 VITALS — SYSTOLIC BLOOD PRESSURE: 116 MMHG | DIASTOLIC BLOOD PRESSURE: 63 MMHG

## 2018-01-25 VITALS — HEART RATE: 97 BPM

## 2018-01-25 DIAGNOSIS — Z51.11: Primary | ICD-10-CM

## 2018-01-25 DIAGNOSIS — C50.211: ICD-10-CM

## 2018-01-25 LAB
ALBUMIN SERPL-MCNC: 4 G/DL (ref 3.4–5)
ALP SERPL-CCNC: 90 U/L (ref 45–117)
ALT SERPL-CCNC: 21 U/L (ref 12–78)
ANION GAP SERPL CALC-SCNC: 8 MMOL/L (ref 8–16)
AST SERPL-CCNC: 13 U/L (ref 15–37)
BASOPHILS # BLD: 0.3 % (ref 0–2)
BILIRUB CONJ SERPL-MCNC: < 0.2 MG/DL (ref 0–0.2)
BILIRUB SERPL-MCNC: 0.4 MG/DL (ref 0.2–1)
BUN SERPL-MCNC: 20 MG/DL (ref 7–18)
CALCIUM SERPL-MCNC: 8.8 MG/DL (ref 8.5–10.1)
CHLORIDE SERPL-SCNC: 107 MMOL/L (ref 98–107)
CO2 SERPL-SCNC: 25 MMOL/L (ref 21–32)
CREAT SERPL-MCNC: 0.6 MG/DL (ref 0.55–1.02)
DEPRECATED RDW RBC AUTO: 21.5 % (ref 11.6–15.6)
EOSINOPHIL # BLD: 0 % (ref 0–4.5)
GLUCOSE SERPL-MCNC: 160 MG/DL (ref 74–106)
HCT VFR BLD CALC: 30.4 % (ref 32.4–45.2)
HGB BLD-MCNC: 10 GM/DL (ref 10.7–15.3)
LYMPHOCYTES # BLD: 7.8 % (ref 8–40)
MAGNESIUM SERPL-MCNC: 1.4 MG/DL (ref 1.8–2.4)
MCH RBC QN AUTO: 35 PG (ref 25.7–33.7)
MCHC RBC AUTO-ENTMCNC: 32.7 G/DL (ref 32–36)
MCV RBC: 106.9 FL (ref 80–96)
MONOCYTES # BLD AUTO: 1.1 % (ref 3.8–10.2)
NEUTROPHILS # BLD: 90.8 % (ref 42.8–82.8)
PLATELET # BLD AUTO: 103 K/MM3 (ref 134–434)
PMV BLD: 7.2 FL (ref 7.5–11.1)
POTASSIUM SERPLBLD-SCNC: 4.1 MMOL/L (ref 3.5–5.1)
PROT SERPL-MCNC: 7.7 G/DL (ref 6.4–8.2)
RBC # BLD AUTO: 2.85 M/MM3 (ref 3.6–5.2)
SODIUM SERPL-SCNC: 140 MMOL/L (ref 136–145)
WBC # BLD AUTO: 2.9 K/MM3 (ref 4–10)

## 2018-01-26 ENCOUNTER — HOSPITAL ENCOUNTER (OUTPATIENT)
Dept: HOSPITAL 74 - JONCCHEMO | Age: 68
Discharge: HOME | End: 2018-01-26
Attending: INTERNAL MEDICINE
Payer: COMMERCIAL

## 2018-01-26 VITALS — TEMPERATURE: 97.8 F | DIASTOLIC BLOOD PRESSURE: 76 MMHG | HEART RATE: 78 BPM | SYSTOLIC BLOOD PRESSURE: 132 MMHG

## 2018-01-26 DIAGNOSIS — C50.211: Primary | ICD-10-CM

## 2018-01-26 PROCEDURE — 3E013GC INTRODUCTION OF OTHER THERAPEUTIC SUBSTANCE INTO SUBCUTANEOUS TISSUE, PERCUTANEOUS APPROACH: ICD-10-PCS | Performed by: INTERNAL MEDICINE

## 2018-02-15 ENCOUNTER — HOSPITAL ENCOUNTER (OUTPATIENT)
Dept: HOSPITAL 74 - JONCCHEMO | Age: 68
Discharge: HOME | End: 2018-02-15
Attending: INTERNAL MEDICINE
Payer: COMMERCIAL

## 2018-02-15 VITALS — SYSTOLIC BLOOD PRESSURE: 124 MMHG | DIASTOLIC BLOOD PRESSURE: 82 MMHG | HEART RATE: 81 BPM

## 2018-02-15 VITALS — TEMPERATURE: 98.1 F

## 2018-02-15 DIAGNOSIS — C50.211: ICD-10-CM

## 2018-02-15 DIAGNOSIS — Z51.11: Primary | ICD-10-CM

## 2018-02-15 LAB
ALBUMIN SERPL-MCNC: 4.1 G/DL (ref 3.4–5)
ALP SERPL-CCNC: 95 U/L (ref 45–117)
ALT SERPL-CCNC: 21 U/L (ref 12–78)
ANION GAP SERPL CALC-SCNC: 10 MMOL/L (ref 8–16)
AST SERPL-CCNC: 12 U/L (ref 15–37)
BASOPHILS # BLD: 0.2 % (ref 0–2)
BILIRUB CONJ SERPL-MCNC: < 0.2 MG/DL (ref 0–0.2)
BILIRUB SERPL-MCNC: 0.2 MG/DL (ref 0.2–1)
BUN SERPL-MCNC: 22 MG/DL (ref 7–18)
CALCIUM SERPL-MCNC: 8.6 MG/DL (ref 8.5–10.1)
CHLORIDE SERPL-SCNC: 104 MMOL/L (ref 98–107)
CO2 SERPL-SCNC: 24 MMOL/L (ref 21–32)
CREAT SERPL-MCNC: 0.7 MG/DL (ref 0.55–1.02)
DEPRECATED RDW RBC AUTO: 20 % (ref 11.6–15.6)
EOSINOPHIL # BLD: 0 % (ref 0–4.5)
GLUCOSE SERPL-MCNC: 150 MG/DL (ref 74–106)
HCT VFR BLD CALC: 29.5 % (ref 32.4–45.2)
HGB BLD-MCNC: 9.6 GM/DL (ref 10.7–15.3)
LYMPHOCYTES # BLD: 7.8 % (ref 8–40)
MAGNESIUM SERPL-MCNC: 1.8 MG/DL (ref 1.8–2.4)
MCH RBC QN AUTO: 35.1 PG (ref 25.7–33.7)
MCHC RBC AUTO-ENTMCNC: 32.4 G/DL (ref 32–36)
MCV RBC: 108.5 FL (ref 80–96)
MONOCYTES # BLD AUTO: 1.2 % (ref 3.8–10.2)
NEUTROPHILS # BLD: 90.8 % (ref 42.8–82.8)
PLATELET # BLD AUTO: 117 K/MM3 (ref 134–434)
PMV BLD: 7 FL (ref 7.5–11.1)
POTASSIUM SERPLBLD-SCNC: 3.9 MMOL/L (ref 3.5–5.1)
PROT SERPL-MCNC: 8 G/DL (ref 6.4–8.2)
RBC # BLD AUTO: 2.72 M/MM3 (ref 3.6–5.2)
SODIUM SERPL-SCNC: 138 MMOL/L (ref 136–145)
WBC # BLD AUTO: 6 K/MM3 (ref 4–10)

## 2018-02-22 ENCOUNTER — HOSPITAL ENCOUNTER (OUTPATIENT)
Dept: HOSPITAL 74 - JONCCHEMO | Age: 68
Discharge: HOME | End: 2018-02-22
Attending: INTERNAL MEDICINE
Payer: COMMERCIAL

## 2018-02-22 VITALS — TEMPERATURE: 97.4 F

## 2018-02-22 VITALS — DIASTOLIC BLOOD PRESSURE: 76 MMHG | HEART RATE: 87 BPM | SYSTOLIC BLOOD PRESSURE: 139 MMHG

## 2018-02-22 DIAGNOSIS — C50.211: ICD-10-CM

## 2018-02-22 DIAGNOSIS — Z51.11: Primary | ICD-10-CM

## 2018-02-22 LAB
ALBUMIN SERPL-MCNC: 4 G/DL (ref 3.4–5)
ALP SERPL-CCNC: 102 U/L (ref 45–117)
ALT SERPL-CCNC: 28 U/L (ref 12–78)
ANION GAP SERPL CALC-SCNC: 10 MMOL/L (ref 8–16)
AST SERPL-CCNC: 21 U/L (ref 15–37)
BASOPHILS # BLD: 0.3 % (ref 0–2)
BILIRUB CONJ SERPL-MCNC: < 0.2 MG/DL (ref 0–0.2)
BILIRUB SERPL-MCNC: 0.5 MG/DL (ref 0.2–1)
BUN SERPL-MCNC: 20 MG/DL (ref 7–18)
CALCIUM SERPL-MCNC: 8.4 MG/DL (ref 8.5–10.1)
CHLORIDE SERPL-SCNC: 105 MMOL/L (ref 98–107)
CO2 SERPL-SCNC: 24 MMOL/L (ref 21–32)
CREAT SERPL-MCNC: 0.7 MG/DL (ref 0.55–1.02)
DEPRECATED RDW RBC AUTO: 17.7 % (ref 11.6–15.6)
EOSINOPHIL # BLD: 0 % (ref 0–4.5)
GLUCOSE SERPL-MCNC: 151 MG/DL (ref 74–106)
HCT VFR BLD CALC: 29.2 % (ref 32.4–45.2)
HGB BLD-MCNC: 9.5 GM/DL (ref 10.7–15.3)
LYMPHOCYTES # BLD: 2.1 % (ref 8–40)
MAGNESIUM SERPL-MCNC: 1.9 MG/DL (ref 1.8–2.4)
MCH RBC QN AUTO: 35.1 PG (ref 25.7–33.7)
MCHC RBC AUTO-ENTMCNC: 32.4 G/DL (ref 32–36)
MCV RBC: 108.3 FL (ref 80–96)
MONOCYTES # BLD AUTO: 2 % (ref 3.8–10.2)
NEUTROPHILS # BLD: 95.6 % (ref 42.8–82.8)
PLATELET # BLD AUTO: 74 K/MM3 (ref 134–434)
PMV BLD: 7.5 FL (ref 7.5–11.1)
POTASSIUM SERPLBLD-SCNC: 3.9 MMOL/L (ref 3.5–5.1)
PROT SERPL-MCNC: 7.8 G/DL (ref 6.4–8.2)
RBC # BLD AUTO: 2.69 M/MM3 (ref 3.6–5.2)
SODIUM SERPL-SCNC: 139 MMOL/L (ref 136–145)
WBC # BLD AUTO: 14.1 K/MM3 (ref 4–10)

## 2018-03-08 ENCOUNTER — HOSPITAL ENCOUNTER (OUTPATIENT)
Dept: HOSPITAL 74 - JONCCHEMO | Age: 68
Discharge: HOME | End: 2018-03-08
Attending: INTERNAL MEDICINE
Payer: COMMERCIAL

## 2018-03-08 VITALS — SYSTOLIC BLOOD PRESSURE: 111 MMHG | DIASTOLIC BLOOD PRESSURE: 69 MMHG | HEART RATE: 82 BPM

## 2018-03-08 VITALS — TEMPERATURE: 98.2 F

## 2018-03-08 DIAGNOSIS — C50.211: ICD-10-CM

## 2018-03-08 DIAGNOSIS — Z51.11: Primary | ICD-10-CM

## 2018-03-08 LAB
ALBUMIN SERPL-MCNC: 3.8 G/DL (ref 3.4–5)
ALBUMIN SERPL-MCNC: 4.1 G/DL (ref 3.4–5)
ALP SERPL-CCNC: 107 U/L (ref 45–117)
ALP SERPL-CCNC: 111 U/L (ref 45–117)
ALT SERPL-CCNC: 17 U/L (ref 12–78)
ALT SERPL-CCNC: 18 U/L (ref 12–78)
ANION GAP SERPL CALC-SCNC: 13 MMOL/L (ref 8–16)
AST SERPL-CCNC: 13 U/L (ref 15–37)
AST SERPL-CCNC: 14 U/L (ref 15–37)
BASOPHILS # BLD: 0.2 % (ref 0–2)
BILIRUB CONJ SERPL-MCNC: < 0.2 MG/DL (ref 0–0.2)
BILIRUB SERPL-MCNC: 0.4 MG/DL (ref 0.2–1)
BILIRUB SERPL-MCNC: 0.5 MG/DL (ref 0.2–1)
BUN SERPL-MCNC: 15 MG/DL (ref 7–18)
CALCIUM SERPL-MCNC: 8.4 MG/DL (ref 8.5–10.1)
CHLORIDE SERPL-SCNC: 105 MMOL/L (ref 98–107)
CO2 SERPL-SCNC: 22 MMOL/L (ref 21–32)
CREAT SERPL-MCNC: 0.6 MG/DL (ref 0.55–1.02)
DEPRECATED RDW RBC AUTO: 18.6 % (ref 11.6–15.6)
EOSINOPHIL # BLD: 0 % (ref 0–4.5)
GLUCOSE SERPL-MCNC: 156 MG/DL (ref 74–106)
HCT VFR BLD CALC: 28.7 % (ref 32.4–45.2)
HGB BLD-MCNC: 9.6 GM/DL (ref 10.7–15.3)
LYMPHOCYTES # BLD: 5.5 % (ref 8–40)
MAGNESIUM SERPL-MCNC: 1.7 MG/DL (ref 1.8–2.4)
MCH RBC QN AUTO: 37.3 PG (ref 25.7–33.7)
MCHC RBC AUTO-ENTMCNC: 33.5 G/DL (ref 32–36)
MCV RBC: 111.2 FL (ref 80–96)
MONOCYTES # BLD AUTO: 1.1 % (ref 3.8–10.2)
NEUTROPHILS # BLD: 93.2 % (ref 42.8–82.8)
PLATELET # BLD AUTO: 120 K/MM3 (ref 134–434)
PMV BLD: 7 FL (ref 7.5–11.1)
POTASSIUM SERPLBLD-SCNC: 3.7 MMOL/L (ref 3.5–5.1)
PROT SERPL-MCNC: 7.6 G/DL (ref 6.4–8.2)
PROT SERPL-MCNC: 7.6 G/DL (ref 6.4–8.2)
RBC # BLD AUTO: 2.58 M/MM3 (ref 3.6–5.2)
SODIUM SERPL-SCNC: 140 MMOL/L (ref 136–145)
WBC # BLD AUTO: 6 K/MM3 (ref 4–10)

## 2018-03-15 ENCOUNTER — HOSPITAL ENCOUNTER (OUTPATIENT)
Dept: HOSPITAL 74 - JONCCHEMO | Age: 68
Discharge: HOME | End: 2018-03-15
Attending: INTERNAL MEDICINE
Payer: COMMERCIAL

## 2018-03-15 VITALS — DIASTOLIC BLOOD PRESSURE: 72 MMHG | SYSTOLIC BLOOD PRESSURE: 118 MMHG

## 2018-03-15 VITALS — TEMPERATURE: 98 F | HEART RATE: 80 BPM

## 2018-03-15 DIAGNOSIS — Z51.11: Primary | ICD-10-CM

## 2018-03-15 DIAGNOSIS — C50.211: ICD-10-CM

## 2018-03-15 LAB
ALBUMIN SERPL-MCNC: 3.9 G/DL (ref 3.4–5)
ALP SERPL-CCNC: 116 U/L (ref 45–117)
ALT SERPL-CCNC: 30 U/L (ref 12–78)
ANION GAP SERPL CALC-SCNC: 11 MMOL/L (ref 8–16)
ANISOCYTOSIS BLD QL: (no result)
AST SERPL-CCNC: 19 U/L (ref 15–37)
BILIRUB CONJ SERPL-MCNC: < 0.2 MG/DL (ref 0–0.2)
BILIRUB SERPL-MCNC: 0.2 MG/DL (ref 0.2–1)
BUN SERPL-MCNC: 16 MG/DL (ref 7–18)
CALCIUM SERPL-MCNC: 8.8 MG/DL (ref 8.5–10.1)
CHLORIDE SERPL-SCNC: 106 MMOL/L (ref 98–107)
CO2 SERPL-SCNC: 24 MMOL/L (ref 21–32)
CREAT SERPL-MCNC: 0.6 MG/DL (ref 0.55–1.02)
DEPRECATED RDW RBC AUTO: 17.6 % (ref 11.6–15.6)
GLUCOSE SERPL-MCNC: 148 MG/DL (ref 74–106)
HCT VFR BLD CALC: 27 % (ref 32.4–45.2)
HGB BLD-MCNC: 9 GM/DL (ref 10.7–15.3)
MACROCYTES BLD QL: (no result)
MAGNESIUM SERPL-MCNC: 1.5 MG/DL (ref 1.8–2.4)
MCH RBC QN AUTO: 36.6 PG (ref 25.7–33.7)
MCHC RBC AUTO-ENTMCNC: 33.2 G/DL (ref 32–36)
MCV RBC: 110.3 FL (ref 80–96)
OVALOCYTES BLD QL SMEAR: (no result)
PLATELET # BLD AUTO: 76 K/MM3 (ref 134–434)
PLATELET BLD QL SMEAR: (no result)
PMV BLD: 7.2 FL (ref 7.5–11.1)
POTASSIUM SERPLBLD-SCNC: 4.2 MMOL/L (ref 3.5–5.1)
PROT SERPL-MCNC: 7.5 G/DL (ref 6.4–8.2)
RBC # BLD AUTO: 2.45 M/MM3 (ref 3.6–5.2)
SODIUM SERPL-SCNC: 141 MMOL/L (ref 136–145)
WBC # BLD AUTO: 12.6 K/MM3 (ref 4–10)

## 2018-03-16 ENCOUNTER — HOSPITAL ENCOUNTER (OUTPATIENT)
Dept: HOSPITAL 74 - JONCCHEMO | Age: 68
Discharge: HOME | End: 2018-03-16
Attending: INTERNAL MEDICINE
Payer: COMMERCIAL

## 2018-03-16 VITALS — HEART RATE: 81 BPM | SYSTOLIC BLOOD PRESSURE: 128 MMHG | DIASTOLIC BLOOD PRESSURE: 81 MMHG | TEMPERATURE: 98 F

## 2018-03-16 DIAGNOSIS — C50.211: Primary | ICD-10-CM

## 2018-03-16 DIAGNOSIS — Z76.89: ICD-10-CM

## 2018-03-16 PROCEDURE — 3E013GC INTRODUCTION OF OTHER THERAPEUTIC SUBSTANCE INTO SUBCUTANEOUS TISSUE, PERCUTANEOUS APPROACH: ICD-10-PCS | Performed by: INTERNAL MEDICINE

## 2018-03-29 ENCOUNTER — HOSPITAL ENCOUNTER (OUTPATIENT)
Dept: HOSPITAL 74 - JONCCHEMO | Age: 68
Discharge: HOME | End: 2018-03-29
Attending: INTERNAL MEDICINE
Payer: COMMERCIAL

## 2018-03-29 VITALS — TEMPERATURE: 97.7 F

## 2018-03-29 VITALS — DIASTOLIC BLOOD PRESSURE: 70 MMHG | HEART RATE: 84 BPM | SYSTOLIC BLOOD PRESSURE: 117 MMHG

## 2018-03-29 DIAGNOSIS — Z51.11: Primary | ICD-10-CM

## 2018-03-29 DIAGNOSIS — C50.211: ICD-10-CM

## 2018-03-29 LAB
ALBUMIN SERPL-MCNC: 4 G/DL (ref 3.4–5)
ALP SERPL-CCNC: 115 U/L (ref 45–117)
ALT SERPL-CCNC: 21 U/L (ref 12–78)
ANION GAP SERPL CALC-SCNC: 6 MMOL/L (ref 8–16)
AST SERPL-CCNC: 10 U/L (ref 15–37)
BASOPHILS # BLD: 0.2 % (ref 0–2)
BILIRUB CONJ SERPL-MCNC: < 0.2 MG/DL (ref 0–0.2)
BILIRUB SERPL-MCNC: 0.3 MG/DL (ref 0.2–1)
BUN SERPL-MCNC: 18 MG/DL (ref 7–18)
CALCIUM SERPL-MCNC: 9.3 MG/DL (ref 8.5–10.1)
CHLORIDE SERPL-SCNC: 103 MMOL/L (ref 98–107)
CO2 SERPL-SCNC: 28 MMOL/L (ref 21–32)
CREAT SERPL-MCNC: 0.6 MG/DL (ref 0.55–1.02)
DEPRECATED RDW RBC AUTO: 20.1 % (ref 11.6–15.6)
EOSINOPHIL # BLD: 0 % (ref 0–4.5)
GLUCOSE SERPL-MCNC: 125 MG/DL (ref 74–106)
HCT VFR BLD CALC: 27.4 % (ref 32.4–45.2)
HGB BLD-MCNC: 9.3 GM/DL (ref 10.7–15.3)
LYMPHOCYTES # BLD: 4.5 % (ref 8–40)
MAGNESIUM SERPL-MCNC: 1.7 MG/DL (ref 1.8–2.4)
MCH RBC QN AUTO: 37.9 PG (ref 25.7–33.7)
MCHC RBC AUTO-ENTMCNC: 33.8 G/DL (ref 32–36)
MCV RBC: 112.2 FL (ref 80–96)
MONOCYTES # BLD AUTO: 5 % (ref 3.8–10.2)
NEUTROPHILS # BLD: 90.3 % (ref 42.8–82.8)
PLATELET # BLD AUTO: 115 K/MM3 (ref 134–434)
PMV BLD: 7.7 FL (ref 7.5–11.1)
POTASSIUM SERPLBLD-SCNC: 4 MMOL/L (ref 3.5–5.1)
PROT SERPL-MCNC: 7.7 G/DL (ref 6.4–8.2)
RBC # BLD AUTO: 2.44 M/MM3 (ref 3.6–5.2)
SODIUM SERPL-SCNC: 137 MMOL/L (ref 136–145)
WBC # BLD AUTO: 9.1 K/MM3 (ref 4–10)

## 2018-04-19 ENCOUNTER — HOSPITAL ENCOUNTER (OUTPATIENT)
Dept: HOSPITAL 74 - JONCCHEMO | Age: 68
Discharge: HOME | End: 2018-04-19
Attending: INTERNAL MEDICINE
Payer: COMMERCIAL

## 2018-04-19 VITALS — SYSTOLIC BLOOD PRESSURE: 144 MMHG | HEART RATE: 86 BPM | DIASTOLIC BLOOD PRESSURE: 81 MMHG

## 2018-04-19 VITALS — TEMPERATURE: 98.1 F

## 2018-04-19 DIAGNOSIS — C50.211: ICD-10-CM

## 2018-04-19 DIAGNOSIS — C79.51: ICD-10-CM

## 2018-04-19 DIAGNOSIS — D69.6: ICD-10-CM

## 2018-04-19 DIAGNOSIS — Z51.11: Primary | ICD-10-CM

## 2018-04-19 LAB
ALBUMIN SERPL-MCNC: 3.7 G/DL (ref 3.4–5)
ALP SERPL-CCNC: 90 U/L (ref 45–117)
ALT SERPL-CCNC: 26 U/L (ref 12–78)
ANION GAP SERPL CALC-SCNC: 7 MMOL/L (ref 8–16)
AST SERPL-CCNC: 14 U/L (ref 15–37)
BASOPHILS # BLD: 0.3 % (ref 0–2)
BILIRUB CONJ SERPL-MCNC: < 0.2 MG/DL (ref 0–0.2)
BILIRUB SERPL-MCNC: 0.3 MG/DL (ref 0.2–1)
BUN SERPL-MCNC: 16 MG/DL (ref 7–18)
CALCIUM SERPL-MCNC: 8.4 MG/DL (ref 8.5–10.1)
CHLORIDE SERPL-SCNC: 106 MMOL/L (ref 98–107)
CO2 SERPL-SCNC: 26 MMOL/L (ref 21–32)
CREAT SERPL-MCNC: 0.5 MG/DL (ref 0.55–1.02)
DEPRECATED RDW RBC AUTO: 23 % (ref 11.6–15.6)
EOSINOPHIL # BLD: 0 % (ref 0–4.5)
GLUCOSE SERPL-MCNC: 136 MG/DL (ref 74–106)
HCT VFR BLD CALC: 31.4 % (ref 32.4–45.2)
HGB BLD-MCNC: 10.5 GM/DL (ref 10.7–15.3)
LYMPHOCYTES # BLD: 5.9 % (ref 8–40)
MAGNESIUM SERPL-MCNC: 1.8 MG/DL (ref 1.8–2.4)
MCH RBC QN AUTO: 37.7 PG (ref 25.7–33.7)
MCHC RBC AUTO-ENTMCNC: 33.4 G/DL (ref 32–36)
MCV RBC: 112.9 FL (ref 80–96)
MONOCYTES # BLD AUTO: 1.2 % (ref 3.8–10.2)
NEUTROPHILS # BLD: 92.6 % (ref 42.8–82.8)
PLATELET # BLD AUTO: 82 K/MM3 (ref 134–434)
PMV BLD: 7.6 FL (ref 7.5–11.1)
POTASSIUM SERPLBLD-SCNC: 4 MMOL/L (ref 3.5–5.1)
PROT SERPL-MCNC: 7.5 G/DL (ref 6.4–8.2)
RBC # BLD AUTO: 2.78 M/MM3 (ref 3.6–5.2)
SODIUM SERPL-SCNC: 139 MMOL/L (ref 136–145)
WBC # BLD AUTO: 4.9 K/MM3 (ref 4–10)

## 2018-04-26 ENCOUNTER — HOSPITAL ENCOUNTER (OUTPATIENT)
Dept: HOSPITAL 74 - JONCCHEMO | Age: 68
Discharge: HOME | End: 2018-04-26
Attending: INTERNAL MEDICINE
Payer: COMMERCIAL

## 2018-04-26 VITALS — HEART RATE: 16 BPM | SYSTOLIC BLOOD PRESSURE: 128 MMHG | DIASTOLIC BLOOD PRESSURE: 79 MMHG

## 2018-04-26 VITALS — TEMPERATURE: 98.1 F

## 2018-04-26 DIAGNOSIS — Z51.11: Primary | ICD-10-CM

## 2018-04-26 DIAGNOSIS — D70.1: ICD-10-CM

## 2018-04-26 DIAGNOSIS — C50.411: ICD-10-CM

## 2018-04-26 DIAGNOSIS — C79.51: ICD-10-CM

## 2018-04-26 LAB
ALBUMIN SERPL-MCNC: 4 G/DL (ref 3.4–5)
ALP SERPL-CCNC: 88 U/L (ref 45–117)
ALT SERPL-CCNC: 24 U/L (ref 12–78)
ANION GAP SERPL CALC-SCNC: 9 MMOL/L (ref 8–16)
AST SERPL-CCNC: 17 U/L (ref 15–37)
BILIRUB CONJ SERPL-MCNC: < 0.2 MG/DL (ref 0–0.2)
BILIRUB SERPL-MCNC: 0.4 MG/DL (ref 0.2–1)
BUN SERPL-MCNC: 20 MG/DL (ref 7–18)
CALCIUM SERPL-MCNC: 8.7 MG/DL (ref 8.5–10.1)
CHLORIDE SERPL-SCNC: 106 MMOL/L (ref 98–107)
CO2 SERPL-SCNC: 25 MMOL/L (ref 21–32)
CREAT SERPL-MCNC: 0.7 MG/DL (ref 0.55–1.02)
DACRYOCYTES BLD QL SMEAR: (no result)
DEPRECATED RDW RBC AUTO: 21.3 % (ref 11.6–15.6)
GLUCOSE SERPL-MCNC: 153 MG/DL (ref 74–106)
HCT VFR BLD CALC: 32.7 % (ref 32.4–45.2)
HGB BLD-MCNC: 11 GM/DL (ref 10.7–15.3)
MACROCYTES BLD QL: (no result)
MAGNESIUM SERPL-MCNC: 1.8 MG/DL (ref 1.8–2.4)
MCH RBC QN AUTO: 37.4 PG (ref 25.7–33.7)
MCHC RBC AUTO-ENTMCNC: 33.5 G/DL (ref 32–36)
MCV RBC: 111.4 FL (ref 80–96)
PLATELET # BLD AUTO: 137 K/MM3 (ref 134–434)
PLATELET BLD QL SMEAR: NORMAL
PMV BLD: 7.5 FL (ref 7.5–11.1)
POTASSIUM SERPLBLD-SCNC: 3.7 MMOL/L (ref 3.5–5.1)
PROT SERPL-MCNC: 7.8 G/DL (ref 6.4–8.2)
RBC # BLD AUTO: 2.94 M/MM3 (ref 3.6–5.2)
SODIUM SERPL-SCNC: 140 MMOL/L (ref 136–145)
WBC # BLD AUTO: 6.1 K/MM3 (ref 4–10)

## 2018-05-03 ENCOUNTER — HOSPITAL ENCOUNTER (OUTPATIENT)
Dept: HOSPITAL 74 - JONCCHEMO | Age: 68
Discharge: HOME | End: 2018-05-03
Attending: INTERNAL MEDICINE
Payer: COMMERCIAL

## 2018-05-03 VITALS — SYSTOLIC BLOOD PRESSURE: 118 MMHG | HEART RATE: 75 BPM | DIASTOLIC BLOOD PRESSURE: 71 MMHG | TEMPERATURE: 98.2 F

## 2018-05-03 DIAGNOSIS — C79.51: ICD-10-CM

## 2018-05-03 DIAGNOSIS — C50.411: ICD-10-CM

## 2018-05-03 DIAGNOSIS — Z51.11: Primary | ICD-10-CM

## 2018-05-03 DIAGNOSIS — D70.1: ICD-10-CM

## 2018-05-03 LAB
ALBUMIN SERPL-MCNC: 3.9 G/DL (ref 3.4–5)
ALP SERPL-CCNC: 81 U/L (ref 45–117)
ALT SERPL-CCNC: 22 U/L (ref 12–78)
ANION GAP SERPL CALC-SCNC: 7 MMOL/L (ref 8–16)
AST SERPL-CCNC: 20 U/L (ref 15–37)
BASOPHILS # BLD: 0.5 % (ref 0–2)
BILIRUB CONJ SERPL-MCNC: < 0.2 MG/DL (ref 0–0.2)
BILIRUB SERPL-MCNC: 0.4 MG/DL (ref 0.2–1)
BUN SERPL-MCNC: 14 MG/DL (ref 7–18)
CALCIUM SERPL-MCNC: 8.7 MG/DL (ref 8.5–10.1)
CHLORIDE SERPL-SCNC: 107 MMOL/L (ref 98–107)
CO2 SERPL-SCNC: 28 MMOL/L (ref 21–32)
CREAT SERPL-MCNC: 0.7 MG/DL (ref 0.55–1.02)
DEPRECATED RDW RBC AUTO: 20.2 % (ref 11.6–15.6)
EOSINOPHIL # BLD: 0.5 % (ref 0–4.5)
GLUCOSE SERPL-MCNC: 121 MG/DL (ref 74–106)
HCT VFR BLD CALC: 32.7 % (ref 32.4–45.2)
HGB BLD-MCNC: 11.1 GM/DL (ref 10.7–15.3)
LYMPHOCYTES # BLD: 10.6 % (ref 8–40)
MAGNESIUM SERPL-MCNC: 1.6 MG/DL (ref 1.8–2.4)
MCH RBC QN AUTO: 37.5 PG (ref 25.7–33.7)
MCHC RBC AUTO-ENTMCNC: 34 G/DL (ref 32–36)
MCV RBC: 110.4 FL (ref 80–96)
MONOCYTES # BLD AUTO: 4.6 % (ref 3.8–10.2)
NEUTROPHILS # BLD: 83.8 % (ref 42.8–82.8)
PLATELET # BLD AUTO: 184 K/MM3 (ref 134–434)
PMV BLD: 7.1 FL (ref 7.5–11.1)
POTASSIUM SERPLBLD-SCNC: 3.8 MMOL/L (ref 3.5–5.1)
PROT SERPL-MCNC: 7.4 G/DL (ref 6.4–8.2)
RBC # BLD AUTO: 2.96 M/MM3 (ref 3.6–5.2)
SODIUM SERPL-SCNC: 142 MMOL/L (ref 136–145)
WBC # BLD AUTO: 4.2 K/MM3 (ref 4–10)

## 2018-05-10 ENCOUNTER — HOSPITAL ENCOUNTER (OUTPATIENT)
Dept: HOSPITAL 74 - JONCCHEMO | Age: 68
Discharge: HOME | End: 2018-05-10
Attending: INTERNAL MEDICINE
Payer: COMMERCIAL

## 2018-05-10 VITALS — HEART RATE: 78 BPM | TEMPERATURE: 97.5 F | SYSTOLIC BLOOD PRESSURE: 143 MMHG | DIASTOLIC BLOOD PRESSURE: 80 MMHG

## 2018-05-10 DIAGNOSIS — C79.51: ICD-10-CM

## 2018-05-10 DIAGNOSIS — Z17.1: ICD-10-CM

## 2018-05-10 DIAGNOSIS — C50.211: Primary | ICD-10-CM

## 2018-05-10 LAB
ALBUMIN SERPL-MCNC: 3.8 G/DL (ref 3.4–5)
ALP SERPL-CCNC: 77 U/L (ref 45–117)
ALT SERPL-CCNC: 22 U/L (ref 12–78)
ANION GAP SERPL CALC-SCNC: 10 MMOL/L (ref 8–16)
ANISOCYTOSIS BLD QL: (no result)
AST SERPL-CCNC: 21 U/L (ref 15–37)
BILIRUB CONJ SERPL-MCNC: < 0.2 MG/DL (ref 0–0.2)
BILIRUB SERPL-MCNC: 0.6 MG/DL (ref 0.2–1)
BUN SERPL-MCNC: 13 MG/DL (ref 7–18)
CALCIUM SERPL-MCNC: 8.8 MG/DL (ref 8.5–10.1)
CHLORIDE SERPL-SCNC: 106 MMOL/L (ref 98–107)
CO2 SERPL-SCNC: 26 MMOL/L (ref 21–32)
CREAT SERPL-MCNC: 0.7 MG/DL (ref 0.55–1.02)
DACRYOCYTES BLD QL SMEAR: (no result)
DEPRECATED RDW RBC AUTO: 19.9 % (ref 11.6–15.6)
GLUCOSE SERPL-MCNC: 117 MG/DL (ref 74–106)
HCT VFR BLD CALC: 34 % (ref 32.4–45.2)
HGB BLD-MCNC: 11.6 GM/DL (ref 10.7–15.3)
MACROCYTES BLD QL: (no result)
MAGNESIUM SERPL-MCNC: 1.6 MG/DL (ref 1.8–2.4)
MCH RBC QN AUTO: 37.9 PG (ref 25.7–33.7)
MCHC RBC AUTO-ENTMCNC: 34.2 G/DL (ref 32–36)
MCV RBC: 110.7 FL (ref 80–96)
PLATELET # BLD AUTO: 250 K/MM3 (ref 134–434)
PLATELET BLD QL SMEAR: NORMAL
PMV BLD: 7.5 FL (ref 7.5–11.1)
POTASSIUM SERPLBLD-SCNC: 3.9 MMOL/L (ref 3.5–5.1)
PROT SERPL-MCNC: 7.3 G/DL (ref 6.4–8.2)
RBC # BLD AUTO: 3.07 M/MM3 (ref 3.6–5.2)
SODIUM SERPL-SCNC: 142 MMOL/L (ref 136–145)
WBC # BLD AUTO: 3.9 K/MM3 (ref 4–10)

## 2018-05-10 PROCEDURE — 3E04305 INTRODUCTION OF OTHER ANTINEOPLASTIC INTO CENTRAL VEIN, PERCUTANEOUS APPROACH: ICD-10-PCS | Performed by: INTERNAL MEDICINE

## 2018-05-10 PROCEDURE — 3E0437Z INTRODUCTION OF ELECTROLYTIC AND WATER BALANCE SUBSTANCE INTO CENTRAL VEIN, PERCUTANEOUS APPROACH: ICD-10-PCS | Performed by: INTERNAL MEDICINE

## 2018-05-10 PROCEDURE — 3E043GC INTRODUCTION OF OTHER THERAPEUTIC SUBSTANCE INTO CENTRAL VEIN, PERCUTANEOUS APPROACH: ICD-10-PCS | Performed by: INTERNAL MEDICINE

## 2018-05-17 ENCOUNTER — HOSPITAL ENCOUNTER (INPATIENT)
Dept: HOSPITAL 74 - JER | Age: 68
LOS: 16 days | DRG: 23 | End: 2018-06-02
Attending: NURSE PRACTITIONER | Admitting: INTERNAL MEDICINE
Payer: COMMERCIAL

## 2018-05-17 VITALS — BODY MASS INDEX: 29.5 KG/M2

## 2018-05-17 DIAGNOSIS — E83.42: ICD-10-CM

## 2018-05-17 DIAGNOSIS — E83.39: ICD-10-CM

## 2018-05-17 DIAGNOSIS — C50.911: ICD-10-CM

## 2018-05-17 DIAGNOSIS — I16.0: ICD-10-CM

## 2018-05-17 DIAGNOSIS — C79.49: Primary | ICD-10-CM

## 2018-05-17 DIAGNOSIS — R51: ICD-10-CM

## 2018-05-17 DIAGNOSIS — R11.0: ICD-10-CM

## 2018-05-17 DIAGNOSIS — N39.0: ICD-10-CM

## 2018-05-17 DIAGNOSIS — B37.0: ICD-10-CM

## 2018-05-17 DIAGNOSIS — E87.1: ICD-10-CM

## 2018-05-17 DIAGNOSIS — E87.6: ICD-10-CM

## 2018-05-17 DIAGNOSIS — R00.1: ICD-10-CM

## 2018-05-17 DIAGNOSIS — G03.8: ICD-10-CM

## 2018-05-17 DIAGNOSIS — G93.5: ICD-10-CM

## 2018-05-17 DIAGNOSIS — C79.51: ICD-10-CM

## 2018-05-17 DIAGNOSIS — K21.9: ICD-10-CM

## 2018-05-17 LAB
APTT BLD: 31.1 SECONDS (ref 26.9–34.4)
INR BLD: 1.1 (ref 0.82–1.09)
PT PNL PPP: 12.4 SEC (ref 9.7–13)

## 2018-05-17 PROCEDURE — C1887 CATHETER, GUIDING: HCPCS

## 2018-05-17 RX ADMIN — HEPARIN SODIUM SCH UNIT: 5000 INJECTION, SOLUTION INTRAVENOUS; SUBCUTANEOUS at 22:00

## 2018-05-17 RX ADMIN — DEXAMETHASONE SCH MG: 4 TABLET ORAL at 21:59

## 2018-05-17 RX ADMIN — DEXAMETHASONE SCH MG: 4 TABLET ORAL at 17:12

## 2018-05-17 NOTE — CONSULT
Consult


Consult Specialty:: Oncology





- Past Medical History


Gastrointestinal: Yes: GERD, Irritable Bowel Disease


Musculoskeletal: Yes: Osteoarthritis


Additional Medical History: H/O thrombophlebitis right LE





- Alcohol/Substance Use


Hx Alcohol Use: No





- Smoking History


Smoking history: Never smoked


Have you smoked in the past 12 months: No





Home Medications





- Allergies


Allergies/Adverse Reactions: 


 Allergies











Allergy/AdvReac Type Severity Reaction Status Date / Time


 


adhesive tape Allergy  Rash Verified 18 10:23


 


No Known Drug Allergies Allergy   Verified 18 10:23














- Home Medications


Home Medications: 


Ambulatory Orders





Multivitamins [Tab-A-Vit -] 1 tab PO DAILY 17 


Ranitidine [Zantac -] 150 mg PO BID 17 


Vitamin C 500 mg PO DAILY 17 


Vitamin D - 1,000 unit PO DAILY 17 


Oxycodone HCl/Acetaminophen [Endocet 5-325 Tablet] 1 each PO Q8H PRN MDD 3  


Dexamethasone [Decadron] 4 mg PO TID 18 


Magnesium Oxide 500 mg PO BID 18 











Family Disease History





- Family Disease History


Family Disease History: CA: Grandparent (mat GM breast ca 50  57), 

Father (panceratic ca 82), Mother (CRC 46  57), Sister (mutiple meyloma)





Physical Exam


Vital Signs: 


 Vital Signs











Temperature  98.1 F   18 10:23


 


Pulse Rate  66   18 10:23


 


Respiratory Rate  18   18 10:23


 


Blood Pressure  110/64   18 10:23


 


O2 Sat by Pulse Oximetry (%)  98   18 10:23














Assessment/Plan





for CSF studies


for CT c/a/p with contrast


NSG


Neurology consults

## 2018-05-17 NOTE — PDOC
History of Present Illness





- General


Chief Complaint: Headache


Stated Complaint: ADMIT, (PCP SENT)


Time Seen by Provider: 05/17/18 10:31


History Source: Patient, Primary Care Provider


Exam Limitations: No Limitations





- History of Present Illness


Initial Comments: 


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 

metastatic (to bone) mammary carcinoma of lobular phenotype and LCIS with 

lymphovascular invasion (currently on chemotherapy and sees Dr. Romero), IBD, 

GERD, RLE thrombophlebitis, and osteoasthritis who p/w left temporal headache 

and nausea for the past week which she believes is d/t starting a taxol 

chemotherapy regimen four weeks ago for her metastatic breast cancer. She has 

been taking extra-strength Tylenol, Percocet, Zofran, and Compazine for her 

symptoms with incomplete relief. She had a brain MRI on 5/14/18 which showed no 

e/o brain mets. She was seen by Dr. Romero in office this morning and discussed 

her symptoms, and was instructed to come to the ED for admission for symptom 

control and further workup. She notes continued watery brown diarrhea, but 

denies any fever, chills, vomiting, chest pain, SOB, abdominal pain, urinary 

symptoms, new leg pain/swelling, or other symptoms.





Past History





- Past Medical History


Allergies/Adverse Reactions: 


 Allergies











Allergy/AdvReac Type Severity Reaction Status Date / Time


 


adhesive tape Allergy  Rash Verified 05/17/18 10:23


 


No Known Drug Allergies Allergy   Verified 05/17/18 10:23











Home Medications: 


Ambulatory Orders





Multivitamins [Tab-A-Vit -] 1 tab PO DAILY 06/22/17 


Ranitidine [Zantac -] 150 mg PO BID 08/29/17 


Vitamin C 500 mg PO DAILY 08/29/17 


Vitamin D - 1,000 unit PO DAILY 08/29/17 


Oxycodone HCl/Acetaminophen [Endocet 5-325 Tablet] 1 each PO Q8H PRN MDD 3 08/31 /17 


Dexamethasone [Decadron] 4 mg PO TID 05/17/18 


Magnesium Oxide 500 mg PO BID 05/17/18 








Anemia: No


Asthma: No


Cancer: Yes (BREAST CA, bone)


CVA: No


COPD: No


Dementia: No


Diabetes: No


GI Disorders: No


 Disorders: No


HTN: Yes (BORDERLINE)


Hypercholesterolemia: No


Liver Disease: No


Seizures: No


Thyroid Disease: No





- Surgical History


Abdominal Surgery: No


Appendectomy: No


Cardiac Surgery: No


Cholecystectomy: No


Lung Surgery: No


Neurologic Surgery: No


Orthopedic Surgery: Yes (L HIP REPLACEMENT 07/17, R KNEE ARTHROSCOPY 1997)





- Suicide/Smoking/Psychosocial Hx


Smoking History: Never smoked


Have you smoked in the past 12 months: No


Information on smoking cessation initiated: No


Hx Alcohol Use: No


Drug/Substance Use Hx: No


Substance Use Type: None





**Review of Systems





- Review of Systems


Able to Perform ROS?: Yes


Constitutional: No: Chills, Fever, Unexplained wgt Loss


HEENTM: No: Nose Congestion, Throat Pain


Respiratory: No: Cough, Shortness of Breath


Cardiac (ROS): Yes: Palpitations (once overnight last night).  No: Chest Pain


ABD/GI: Yes: Diarrhea, Nausea.  No: Constipated, Vomiting


: No: Burning, Dysuria


Musculoskeletal: No: Back Pain, Neck Pain


Integumentary: No: Bruising, Rash


Neurological: Yes: Headache.  No: Numbness, Tingling, Weakness, Dizziness


Endocrine: No: Unexplained Weight Gain, Unexplained Weight Loss





*Physical Exam





- Vital Signs


 Last Vital Signs











Temp Pulse Resp BP Pulse Ox


 


 98.1 F   66   18   110/64   98 


 


 05/17/18 10:23  05/17/18 10:23  05/17/18 10:23  05/17/18 10:23  05/17/18 10:23














- Physical Exam


General Appearance: Yes: Nourished, Appropriately Dressed, Other (pleasant and 

well-appearing older adult female in no distress who answers questions 

appropriately and is accompanied at bedside by her significant other).  No: 

Apparent Distress


HEENT: positive: EOMI, RODRIGUEZ, Normal Voice, Hearing Grossly Normal.  negative: 

Scleral Icterus (R), Scleral Icterus (L), Nasal Congestion


Neck: positive: Trachea midline, Supple.  negative: Tender, Rigid


Respiratory/Chest: positive: Lungs Clear, Normal Breath Sounds.  negative: 

Respiratory Distress, Crackles, Rhonchi, Stridor, Wheezing


Cardiovascular: positive: Regular Rhythm, Regular Rate, S1, S2.  negative: Edema

, JVD, Murmur


Gastrointestinal/Abdominal: positive: Normal Bowel Sounds, Soft.  negative: 

Tender, Organomegaly, Pulsatile Mass, Guarding


Musculoskeletal: positive: Normal Inspection.  negative: Decreased Range of 

Motion, Vertebral Tenderness


Extremity: positive: Normal Capillary Refill, Normal Inspection, Normal Range 

of Motion.  negative: Tender, Cyanosis


Integumentary: positive: Dry, Warm, Other (left breast normal; right breast 

diffusely firm, contracted, peau d'orange, diffusely erythematous with 

additional surrounding 1 cm patches of erythema which patient states have been 

concerning for skin mets).  negative: Bruising


Neurologic: positive: CNs II-XII NML intact, Fully Oriented, Alert, Normal Mood/

Affect, Normal Response, Motor Strength 5/5, Finger to Nose.  negative: EOM 

Palsy, Facial Droop, Numbness, Sensory Deficit, Confused, Disoriented





Medical Decision Making





- Medical Decision Making


67 YOF with metastatic breast cancer on taxol regimen p/w HA and nausea x1 week.





 Initial Vital Signs











Temp Pulse Resp BP Pulse Ox


 


 98.1 F   66   18   110/64   98 


 


 05/17/18 10:23  05/17/18 10:23  05/17/18 10:23  05/17/18 10:23  05/17/18 10:23








Spoke with Dr. Romero who requests admission to hospitalist and consultation 

with Dr. Verde.


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 

serial LP as well as sxs control.


Microblog sent to [facility] (patient's PCP admits only to Kelli Encarnacion).


Consult orders placed to Flaco Romero and Ammon.


The patient states she does not need anything for symptoms currently.





05/17/18 12:42


Spoke with Mary Mustafa; patient admitted to Med/Surg Inpatient to Dr. Mcmanus.


Decision to Admit order placed.





*DC/Admit/Observation/Transfer


Diagnosis at time of Disposition: 


 Leptomeningeal metastases, Nausea





Breast cancer metastasized to bone


Qualifiers:


 Laterality: right Qualified Code(s): C50.911 - Malignant neoplasm of 

unspecified site of right female breast





Headache


Qualifiers:


 Headache type: unspecified Headache chronicity pattern: unspecified pattern 

Intractability: intractable Qualified Code(s): R51 - Headache








- Discharge Dispostion


Condition at time of disposition: Guarded


Decision to Admit order: Yes





- Referrals


Referrals: 


Edgar Romero MD [Primary Care Provider] - 





- Patient Instructions





- Post Discharge Activity

## 2018-05-17 NOTE — CON.NEURO
Consult





- History of Present Illness


History of Present Illness: 


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 

(s/p genzar and carboplatin) diagnosed 2017 now on taxol, mammary carcinoma 

of lobular phenotype and LCIS with lymphovascular invasion (currently on 

chemotherapy and sees Dr. Romero), IBD, GERD, RLE thrombophlebitis, and 

osteoasthritis presented to Dr. Romero office for x1 week of headache and nausea

, vertigo and diarrhea/indigestion. Pt would have recieved her 5th dose today, 

she started about 1 month ago. 


mild left sided HA -- ? double vision though not an issue now. Sees optho- ? 

cataract L eye. 


no focal weakness, numbness, slurred speech. 





MRI reviewed. 


MRI/BRAIN MRI W W/O CONTRAST : Upon further review of the images, there is 

faint focal linear-like increased T2 signal intensity in superior aspect of the 

right cerebellum that appears to be enhancing on the postcontrast examination 

measuring approximately 11 mm in anterior-posterior length and 7 mm in width 

suggestive of focal cortical enhancement. There is also leptomeningeal 

enhancement within the superior cerebellar folia, bilaterally. There is 

leptomeningeal enhancement along posterior margin of the clivus as well as 

dural enhancement in the included portion of the upper cervical spine, mainly 

anteriorly up to upper C3 level. 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 

carcinomatosis, considering the clinical history of breast cancer with focal 

cortical involvement in the right cerebellum, superiorly. Please correlate with 

CSF fluid analysis and close follow-up MRI is recommended. Case discussed with 

Dr. Gabriel Verde and Dr. Edgar oRmero, caring attending physician

















- Past Medical History


Gastrointestinal: Yes: GERD, Irritable Bowel Disease


Musculoskeletal: Yes: Osteoarthritis


Additional Medical History: H/O thrombophlebitis right LE





- Alcohol/Substance Use


Hx Alcohol Use: No


History of Substance Use: reports: None





- Smoking History


Smoking history: Never smoked


Have you smoked in the past 12 months: No





- Social History


ADL: Independent


Occupation: [employer] nurse 


History of Recent Travel: No





Home Medications





- Allergies


Allergies/Adverse Reactions: 


 Allergies











Allergy/AdvReac Type Severity Reaction Status Date / Time


 


adhesive tape Allergy  Rash Verified 18 10:23


 


No Known Drug Allergies Allergy   Verified 18 10:23














- Home Medications


Home Medications: 


Ambulatory Orders





Multivitamins [Tab-A-Vit -] 1 tab PO DAILY 17 


Ranitidine [Zantac -] 150 mg PO BID 17 


Vitamin C 500 mg PO DAILY 17 


Vitamin D - 1,000 unit PO DAILY 17 


Oxycodone HCl/Acetaminophen [Endocet 5-325 Tablet] 1 each PO Q8H PRN MDD 3  


Dexamethasone [Decadron] 4 mg PO TID 18 


Inderal - 40 mg PO DAILY 18 


Magnesium Oxide 500 mg PO BID 18 











Family Disease History





- Family Disease History


Family Disease History: CA: Grandparent (mat GM breast ca 50  57), 

Father (panceratic ca 82), Mother (CRC 46  57), Sister (mutiple meyloma)





Physical Exam-Neuro


Vital Signs: 


 Vital Signs











Temperature  98 F   18 16:41


 


Pulse Rate  76   18 16:41


 


Respiratory Rate  18   18 16:41


 


Blood Pressure  138/79   18 16:41


 


O2 Sat by Pulse Oximetry (%)  98   18 10:23











Labs: 


 INR, PTT











INR  1.10  (0.82-1.09)   18  14:15    














- Neuro Exam


Level Of Consciousness: Yes: Alert, Oriented to Person (EOMI, min esotropia L 

eye, slight aniscoria L >R --constrict, no facial, motor 5/5, reflexes 

symmetric )





Imaging





- Results


MRI: Report Reviewed, Image Reviewed





Problem List





- Problems


(1) Breast cancer metastasized to bone


Code(s): C50.919 - MALIGNANT NEOPLASM OF UNSP SITE OF UNSPECIFIED FEMALE BREAST

; C79.51 - SECONDARY MALIGNANT NEOPLASM OF BONE   


Qualifiers: 


   Laterality: right   Qualified Code(s): C50.911 - Malignant neoplasm of 

unspecified site of right female breast; C79.51 - Secondary malignant neoplasm 

of bone; C79.51 - Secondary malignant neoplasm of bone; C79.51 - Secondary 

malignant neoplasm of bone; C79.51 - Secondary malignant neoplasm of bone   





(2) Headache


Code(s): R51 - HEADACHE   


Qualifiers: 


   Headache type: unspecified   Headache chronicity pattern: unspecified 

pattern   Intractability: intractable   Qualified Code(s): R51 - Headache   





(3) Leptomeningeal metastases


Code(s): C79.49 - SECONDARY MALIGNANT NEOPLASM OF OTH PARTS OF NERVOUS SYSTEM   





(4) Nausea


Code(s): R11.0 - NAUSEA   





Assessment/Plan


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 

(s/p genzar and carboplatin) diagnosed 2017 now on taxol, mammary carcinoma 

of lobular phenotype and LCIS with lymphovascular invasion , HX of focal RT , (

currently on chemotherapy and sees Dr. Romero), IBD, GERD, RLE thrombophlebitis, 

and osteoasthritis presented to Dr. Romero office for x1-2 week of headache and 

nausea, vertigo and diarrhea/indigestion.


MRI shows ? enhancement R cerebellar area/ leptomeningeal disease --


on decadron 


plan for LUMBAR TAP in AM -signed consent in chart. 





DR Stanford

## 2018-05-17 NOTE — CONS
DATE OF CONSULTATION:  05/17/2018

 

REQUESTING PHYSICIAN:  Edgar Romero MD

 

CONSULTANT:  Gabriel Verde MD

 

CHIEF COMPLAINT:  Increasing headache and left-sided blurry vision of 2 weeks'

duration.

 

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

triple-negative breast CA with metastases to the left femur status post ORIF, who

complains of 2 weeks' history of increasing left-sided headache and left-sided blurry

vision.  The pain goes from the left side of the hemicranium down to the neck at

times.  She denies nausea, vomiting, or seizure activities.  She has no weakness or

numbness in her extremities.  She does have chronic pain with right knee pain

secondary to osteoarthritis.  She has undergone ORIF for metastatic femur disease to

the left femur area last year.  She had no fever, but had some subjective chills. 

There is no bowel or bladder dysfunction.

 

PAST MEDICAL HISTORY:  Significant for stage IV, triple-negative breast CA;

gastroesophageal reflux disease; irritable bowel syndrome; osteoarthritis.

 

MEDICATIONS:  Include a multivitamin, Percocet, Decadron, magnesium oxide, Zantac.

 

ALLERGIES:  ADHESIVE TAPE.

 

SOCIAL HISTORY:  She does not smoke.  She only drinks alcohol socially.  She is a

retired ICU nurse.

 

REVIEW OF SYSTEMS:  Otherwise negative for other major constitutional, head and neck,

cardiovascular, pulmonary, gastrointestinal, genitourinary, endocrinological,

neurological, and psychological problems except for the above.

 

PHYSICAL EXAMINATION:

General:  The patient is awake and alert.  She is oriented x4.

Neurological:  Cranial nerve examination is intact 2-12.  There is no diplopia. 

Motor examination shows 5/5 strength without a drift.  Sensory examination is intact

to light touch and vibratory sensation.  Deep tendon reflexes are hyporeflexic

throughout.  There is no pathological long tract sign.  Gait is not tested for safety

reasons.  Cerebellar examination demonstrated intact finger-to-nose examination.

 

LABORATORY:  Examinations are pending including CBC and INR/PTT.

 

An MRI of the brain from 3 days earlier demonstrated mild cerebral atrophy.  There is

no acute ischemia.  There is an enhancement in the right superior cerebellar folia

with arachnoid enhancement as well as an anterior arachnoid enhancement at the

craniocervical junction.  There is no hydrocephalus.

 

IMPRESSION:

1.  Stage IV metastatic breast cancer, rule out leptomeningeal disease.

2.  Status post left hip/femur open reduction and internal fixation secondary to

metastatic disease.

3.  Osteoarthritis.

4.  Gastroesophageal reflux disease.

 

RECOMMENDATIONS:  The patient presents with 2 weeks' history of headache and blurry

vision.  She has no focal neurological deficit otherwise on my examination.  There is

no nuchal rigidity.  MRI does demonstrate some enhancement of the right superior

cerebellum as well as the anterior craniocervical junction which could be consistent

with leptomeningeal disease, and a lumbar puncture to obtain CSF samples for both

cytology and routine CSF study is recommended.  A baseline CBC with platelet count as

well as INR/PT should be done prior to the lumbar puncture.  The patient has been on

chemotherapy regimen under the care of the oncology team, and she will continue to be

followed and treated as such.  Further treatment can be discussed upon the

availability of the CSF studies.  The above was discussed with the referring

physician, Dr. Romero, as well as the emergency room attending and the patient.  All

questions were answered.

 

 

GABRIEL VERDE M.D.

 

DARNELL/[id number]

DD: 05/17/2018 14:13

DT: 05/17/2018 14:48

Job #:  [id number]

## 2018-05-17 NOTE — PN
Progress Note (short form)





- Note


Progress Note: 





NEUROSURGERY CONSULT DICTATED





Consult requested by Dr Romero


H/o triple negative breast CT, chest wall and bone mets s/p L THR, c/o 

increasing H/A and blurriness (L visual fields) x 2 weeks; some hand tremor 

treated with inderal. Occ chill but never fever.


P: AF, VSS


HEENT- NC/At; Neck- supple; Cor- RRR; Lungs- CTA B;; Abd- benign; Ext- no sign 

of DVT


CN- intact; Motor- 4+-5 B UE/LE; Sensation- intact LT/vibration; DTR- 

hyporeflexic; Cerebellar- intact FTN B


Brain MRI with/without ashley(5-14): Mild atrophy, R superior cerebellar broad/

curvilinear folia and arachnoid enhancement visible on axial, coronal, and 

sagittal images; anterior cranial cervical junction enhancement;  no acute 

ischemia noted


Stage IV triple negative breast CA 


R/o leptomeningeal disease


INR/PTT/platelet count 


Recommend LP for cytology and other routine CSF studies


Care d/w Dr Romero

## 2018-05-17 NOTE — PDOC
Attending Attestation





- Resident


Resident Name: Caitlin Back





- ED Attending Attestation


I have performed the following: I have examined & evaluated the patient, The 

case was reviewed & discussed with the resident, I agree w/resident's findings 

& plan, Exceptions are as noted





- HPI


HPI: 





05/17/18 11:34


"The patient is a 67 year old female, with a significant past medical history 

of breast cancer(right breast, metastatic to bones) and hypertension, who 

presents to the emergency department sent by Dr. Romero for evaluation of nausea 

and headache for several days. The patient reports diffuse head and eye pain, 

exacerbated when laying flat. Also reports nausea but no vomiting, diarrhea, or 

constipation. She denies any fever, chills, cough, or dizziness. She denies any 

chest pain or shortness of breath. 


Pt had outpt MRI that was done 3 days ago and read as negative. However, there 

is some suspicion for leptomeningeal disease. Pt is currently on steroids, 

which have not helped significantly.





Oncologist: Dr. Romero


PCP: Dr. Teresa


"





- Physicial Exam


PE: 





05/17/18 11:36


"GENERAL: Awake, alert, and fully oriented, in no acute distress.


HEAD: No signs of trauma


EYES: PERRLA, EOMI, sclera anicteric, conjunctiva clear


ENT: Auricles normal inspection, hearing grossly normal, nares patent, 

oropharynx clear without exudates. Moist mucosa


NECK: Nontender, no stepoffs, Normal ROM, supple, no lymphadenopathy, JVD, or 

masses


LUNGS: Breath sounds equal, clear to auscultation bilaterally.  No wheezes, and 

no crackles


HEART: Regular rate and rhythm, normal S1 and S2, no murmurs, rubs or gallops


ABDOMEN: Soft, nontender, normoactive bowel sounds.  No guarding, no rebound.  

No masses


EXTREMITIES: Normal range of motion, no edema.  No clubbing or cyanosis. No 

cords, erythema, or tenderness


NEUROLOGICAL: Cranial nerves II through XII intact. 5/5 strength and sensation 

in all extremities, Normal speech, normal gait, normal cerebellar function


SKIN: Warm, Dry, normal turgor, no rashes or lesions noted.


"





- Medical Decision Making





05/17/18 11:36


67 F with metastatic breast CA, presenting with headache. Possible LC. Pt sent 

in by Dr. Romero for admission


- Labs


- Admit


- Dr. Medhat Verde, ns, to follow

## 2018-05-17 NOTE — HP
Admitting History and Physical





- Admission


Chief Complaint: headache, nasuea x1 week


History of Present Illness: 





This is a 67 year old female with pmhx of triple negative right breast 

metastatic to bone (s/p genzar and carboplatin) diagnosed 2017 now on taxol

, mammary carcinoma of lobular phenotype and LCIS with lymphovascular invasion (

currently on chemotherapy and sees Dr. Romero), IBD, GERD, RLE thrombophlebitis, 

and osteoasthritis presented to Dr. Romero office for x1 week of headache and 

nausea, vertigo and diarrhea/indigestion. Pt would have recieved her 5th dose 

today, she started about 1 month ago. 


Headache is associated with blurry vision specifically double to the left eye, 

right is fine. HA also worsens when she lays on her left side. 


She did have R nathaniel surgery with opthomology Dr. Carcamo who left her left eye 

for reading and right for distance, but in the last week her vision and reading 

capability have diminished with both eyes open. 


Nausea treated with zofran or compazine. 


Additionally, she notes increased diarrhea and intermittent palpitations x2 

days. EKG negative for ischemia 


She does get SOB with ambulation 





Brain MRI addendum made for possible leptomeningeal involvement 


History Source: Patient


Limitations to Obtaining History: No Limitations





- Past Medical History


Gastrointestinal: Yes: GERD, Irritable Bowel Disease


Heme/Onc: Yes: Cancer (breast ca)


Musculoskeletal: Yes: Osteoarthritis





- Past Surgical History


Additional Past Surgical History: 





Left hip replacement surgery 2017





- Smoking History


Smoking history: Never smoked


Have you smoked in the past 12 months: No





- Alcohol/Substance Use


Hx Alcohol Use: No


History of Substance Use: reports: None





- Social History


Usual Living Arrangement: Yes: With Spouse


ADL: Independent


Occupation: [employer] nurse 


History of Recent Travel: No





Home Medications





- Allergies


Allergies/Adverse Reactions: 


 Allergies











Allergy/AdvReac Type Severity Reaction Status Date / Time


 


adhesive tape Allergy  Rash Verified 18 10:23


 


No Known Drug Allergies Allergy   Verified 18 10:23














- Home Medications


Home Medications: 


Ambulatory Orders





Multivitamins [Tab-A-Vit -] 1 tab PO DAILY 17 


Ranitidine [Zantac -] 150 mg PO BID 17 


Vitamin C 500 mg PO DAILY 17 


Vitamin D - 1,000 unit PO DAILY 17 


Oxycodone HCl/Acetaminophen [Endocet 5-325 Tablet] 1 each PO Q8H PRN MDD 3  


Dexamethasone [Decadron] 4 mg PO TID 18 


Magnesium Oxide 500 mg PO BID 18 











Family Disease History





- Family Disease History


Family Disease History: CA: Grandparent (mat GM breast ca 50  57), 

Father (panceratic ca 82), Mother (CRC 46  57), Sister (mutiple meyloma)





Review of Systems





- Review of Systems


Constitutional: reports: No Symptoms


Eyes: reports: Blurred Vision, Double Vision


HENT: reports: No Symptoms


Neck: reports: No Symptoms


Cardiovascular: reports: No Symptoms


Respiratory: reports: No Symptoms


Gastrointestinal: reports: Nausea


Genitourinary: reports: No Symptoms


Breasts: reports: See HPI


Musculoskeletal: reports: Other (r knee pain)


Integumentary: reports: No Symptoms


Neurological: reports: Headache


Endocrine: reports: No Symptoms


Hematology/Lymphatic: reports: No Symptoms


Psychiatric: reports: No Symptoms





Physical Examination


Vital Signs: 


 Vital Signs











Temperature  98.1 F   18 10:23


 


Pulse Rate  66   18 10:23


 


Respiratory Rate  18   18 10:23


 


Blood Pressure  110/64   18 10:23


 


O2 Sat by Pulse Oximetry (%)  98   18 10:23











Constitutional: Yes: No Distress


Eyes: Yes: Conjunctiva Clear


HENT: Yes: Atraumatic


Neck: Yes: Supple


Cardiovascular: Yes: Regular Rate and Rhythm, S1, S2


Respiratory: Yes: Regular, CTA Bilaterally


Gastrointestinal: Yes: Normal Bowel Sounds, Soft


Renal/: Yes: WNL


Breast(s): Yes: Right, Mass, Skin Changes (erythema, warm, hard)


Musculoskeletal: Yes: WNL


Extremities: Yes: WNL


Edema: No





Imaging





- Results


MRI: Report Reviewed (MRI 2018)





Problem List





- Problems


(1) Breast cancer metastasized to bone


Code(s): C50.919 - MALIGNANT NEOPLASM OF UNSP SITE OF UNSPECIFIED FEMALE BREAST

; C79.51 - SECONDARY MALIGNANT NEOPLASM OF BONE   


Qualifiers: 


   Laterality: right   Qualified Code(s): C50.911 - Malignant neoplasm of 

unspecified site of right female breast; C79.51 - Secondary malignant neoplasm 

of bone; C79.51 - Secondary malignant neoplasm of bone; C79.51 - Secondary 

malignant neoplasm of bone; C79.51 - Secondary malignant neoplasm of bone   





(2) Headache


Code(s): R51 - HEADACHE   


Qualifiers: 


   Headache type: unspecified   Headache chronicity pattern: unspecified 

pattern   Intractability: intractable   Qualified Code(s): R51 - Headache   





(3) Leptomeningeal metastases


Code(s): C79.49 - SECONDARY MALIGNANT NEOPLASM OF OTH PARTS OF NERVOUS SYSTEM   





(4) Nausea


Code(s): R11.0 - NAUSEA   





Assessment/Plan





Assessment: 67 year old female with Right metastatic breast ca admitted with HA 

and nausea





Plan: 





1. R breast CA, metastatic to bone and possibly brain?


- MRI from 2018 addendum made showing possible leptomeningeal enhancement 


- Start decadron 4mg q6hr 


- Serial LPs per neurology 


- CTAP, CT chest with contrast 


- NS @ 50cc/hr x 24 hrs 


- Neurology, neurosurgery consulted 





2. Begin central tremor


- Inderal 40mg daily 





3. DVT


- Heparin sq for now, until LPs done 





4. HA/ Nausea 


- Likely due to above 


- Tylenol, zofran, compazine prn 


- EKG noted 





Visit type





- Emergency Visit


Emergency Visit: Yes


ED Registration Date: 18


Care time: The patient presented to the Emergency Department on the above date 

and was hospitalized for further evaluation of their emergent condition.





- New Patient


This patient is new to me today: Yes


Date on this admission: 18





- Critical Care


Critical Care patient: No





Hospitalist Screening





- Colonoscopy Questionnaire


Colonoscopy Questionnaire: 





Colonoscopy Questionnaire








-   Patient:


50 - 75 years old and never had a screening colonoscopy: Unknown


History of colon or rectal polyps, or CA: Unknown


History of IBD, Crohn's disease or UC: Unknown


History of abdominal radiation therapy as a child: Unknown





-   Relative:


1 with colon or rectal CA, or polyps at age 60 or younger: Unknown


Colon or rectal CA diagnosed at age 45 or younger: Unknown


Multiple relatives with colon or rectal CA: Unknown





-   Outcome:


Screening Result: Negative Screen

## 2018-05-18 LAB
ALBUMIN SERPL-MCNC: 3.3 G/DL (ref 3.4–5)
ALP SERPL-CCNC: 125 U/L (ref 45–117)
ALT SERPL-CCNC: 95 U/L (ref 12–78)
ANION GAP SERPL CALC-SCNC: 9 MMOL/L (ref 8–16)
ANISOCYTOSIS BLD QL: (no result)
APPEARANCE CSF: CLEAR
AST SERPL-CCNC: 51 U/L (ref 15–37)
BILIRUB SERPL-MCNC: 0.7 MG/DL (ref 0.2–1)
BUN SERPL-MCNC: 10 MG/DL (ref 7–18)
CALCIUM SERPL-MCNC: 8.3 MG/DL (ref 8.5–10.1)
CHLORIDE SERPL-SCNC: 104 MMOL/L (ref 98–107)
CO2 SERPL-SCNC: 26 MMOL/L (ref 21–32)
COLOR CSF: COLORLESS
CREAT SERPL-MCNC: 0.4 MG/DL (ref 0.55–1.02)
DEPRECATED RDW RBC AUTO: 18.3 % (ref 11.6–15.6)
GLUCOSE CSF-MCNC: 51 MG/DL (ref 50–80)
GLUCOSE SERPL-MCNC: 134 MG/DL (ref 74–106)
HCT VFR BLD CALC: 31.8 % (ref 32.4–45.2)
HGB BLD-MCNC: 10.9 GM/DL (ref 10.7–15.3)
MACROCYTES BLD QL: (no result)
MAGNESIUM SERPL-MCNC: 1.8 MG/DL (ref 1.8–2.4)
MCH RBC QN AUTO: 36.9 PG (ref 25.7–33.7)
MCHC RBC AUTO-ENTMCNC: 34.4 G/DL (ref 32–36)
MCV RBC: 107.4 FL (ref 80–96)
OVALOCYTES BLD QL SMEAR: (no result)
PHOSPHATE SERPL-MCNC: 3 MG/DL (ref 2.5–4.9)
PLATELET # BLD AUTO: 241 K/MM3 (ref 134–434)
PLATELET BLD QL SMEAR: NORMAL
PMV BLD: 7.6 FL (ref 7.5–11.1)
POTASSIUM SERPLBLD-SCNC: 3.8 MMOL/L (ref 3.5–5.1)
PROT SERPL-MCNC: 6.3 G/DL (ref 6.4–8.2)
RBC # BLD AUTO: 2.96 M/MM3 (ref 3.6–5.2)
SODIUM SERPL-SCNC: 139 MMOL/L (ref 136–145)
WBC # BLD AUTO: 5.2 K/MM3 (ref 4–10)
WBC # CSF: 5 /UL

## 2018-05-18 RX ADMIN — DEXAMETHASONE SCH MG: 4 TABLET ORAL at 22:06

## 2018-05-18 RX ADMIN — DEXAMETHASONE SCH MG: 4 TABLET ORAL at 18:52

## 2018-05-18 RX ADMIN — HEPARIN SODIUM SCH UNIT: 5000 INJECTION, SOLUTION INTRAVENOUS; SUBCUTANEOUS at 22:06

## 2018-05-18 RX ADMIN — HEPARIN SODIUM SCH: 5000 INJECTION, SOLUTION INTRAVENOUS; SUBCUTANEOUS at 13:05

## 2018-05-18 RX ADMIN — PANTOPRAZOLE SODIUM SCH MG: 40 TABLET, DELAYED RELEASE ORAL at 18:52

## 2018-05-18 RX ADMIN — ACETAMINOPHEN PRN MG: 325 TABLET ORAL at 13:05

## 2018-05-18 RX ADMIN — HEPARIN SODIUM SCH UNIT: 5000 INJECTION, SOLUTION INTRAVENOUS; SUBCUTANEOUS at 06:14

## 2018-05-18 RX ADMIN — DEXAMETHASONE SCH MG: 4 TABLET ORAL at 09:58

## 2018-05-18 RX ADMIN — DEXAMETHASONE SCH MG: 4 TABLET ORAL at 13:05

## 2018-05-18 NOTE — EKG
Test Reason : 

Blood Pressure : ***/*** mmHG

Vent. Rate : 069 BPM     Atrial Rate : 069 BPM

   P-R Int : 160 ms          QRS Dur : 082 ms

    QT Int : 420 ms       P-R-T Axes : 054 026 029 degrees

   QTc Int : 450 ms

 

NORMAL SINUS RHYTHM

POSSIBLE LEFT ATRIAL ENLARGEMENT

WHEN COMPARED WITH ECG OF 22-JUN-2017 12:11,

NO SIGNIFICANT CHANGE WAS FOUND

Confirmed by SNEHA VELA MD ([id number]) on 5/18/2018 11:19:01 AM

 

Referred By:             Confirmed By:SNEHA VELA MD

## 2018-05-18 NOTE — PN
Progress Note (short form)





- Note


Progress Note: 





Patient seen and examined 





67 year old with triple negative breast ca. 


Presented with Stage IV disease with extensive bone involvement and chest wall 

and harvey involvement. 


Initial therapy- left hip replacement followed by chemotherapy with 

carboplatinum and monthly zometa. 


Initial response with improvement in harvey mets and chest wall disease. Needed 

several fields of RT to spine , hips and shoulder.  Recent disease progression 

with chest wall involvement progression. 


Chemotherapy changed to weekly taxol therapy. 


About 2 weeks PTA, began experiencing headaches . Has had progression of 

headaches. 


Had visual problems with diplopia left eye and some difficulty focusing. 


Seen by opthalmology -- told of cataracts. 


Underwent MRI  5/14,-Initial report- Little change from prior MRI done before 

therapy instituted. 


Seen 5/17 in clinic- progression oh headaches and admitted. 


Dr. Verde reviewed MRI and found subtle changes suspicious of leptomeningeal 

disease. 


Re-review of MRI by neuro-radiologist agreed. 


 Last Vital Signs











Temp Pulse Resp BP Pulse Ox


 


 98.0 F   79   18   120/71   95 


 


 05/18/18 14:39  05/18/18 14:39  05/18/18 14:39  05/18/18 14:39  05/18/18 09:00











HEENT: JOSEPH, EOM Intact


Oropharynx: No thrush, No mucositis


Neck: Supple


Nodes: Without adenopathy


Breasts: extensive erythema and edema of  right breast with induration and 

nodularity extending onto chest wall and below breast 


Cor: RSR, No murmurs, No gallops


Lungs: Clear to P&A


Abd: Soft, Normal bowel sounds, No organomegaly


Ext:No significant edema


Skin: No rashes, Integument intact


 CBC, BMP





 05/18/18 06:00 





 05/18/18 06:00 





 Current Medications











Generic Name Dose Route Start Last Admin





  Trade Name Freq  PRN Reason Stop Dose Admin


 


Acetaminophen  650 mg  05/17/18 16:13  05/18/18 13:05





  Tylenol -  PO   650 mg





  Q4H PRN   Administration





  HEADACHE   


 


Acetaminophen  325 mg  05/17/18 16:18  05/18/18 08:06





  Tylenol -  PO   325 mg





  Q8H PRN   Administration





  PAIN 4-6   


 


Dexamethasone  4 mg  05/17/18 16:15  05/18/18 13:05





  Decadron -  PO   4 mg





  QID ANTONIO   Administration


 


Enoxaparin Sodium  40 mg  05/19/18 10:00  





  Lovenox -  SQ   





  DAILY ANTOINO   


 


Heparin Sodium (Porcine)  5,000 unit  05/17/18 22:00  05/18/18 13:05





  Heparin -  SQ  05/18/18 23:59  Not Given





  TID ANTONIO   


 


Oxycodone HCl  5 mg  05/17/18 16:18  05/18/18 08:05





  Roxicodone -  PO   5 mg





  Q8H PRN   Administration





  PAIN 4-6   


 


Propranolol HCl  40 mg  05/18/18 10:00  05/18/18 09:58





  Inderal -  PO   40 mg





  DAILY ANTONIO   Administration








Impression :





Metastatic breast ca


Chest wall , bone, and likely leptomeningeal involvement 


s/p LP for cytology 





Plan: 





Await CSF cytology


Since multiple taps often are necessary  to establish diagnosis, would continue 

with heparin prophylaxis rather than lovenox. 


Will add protonix.

## 2018-05-18 NOTE — PN
Physical Exam: 


SUBJECTIVE: Patient seen and examined. LP done, I was present at bedside. pt 

tolerated well. HA persistent, before procedure, took percocet. Nausea 

improved. 








OBJECTIVE:





 Vital Signs











 Period  Temp  Pulse  Resp  BP Sys/Zhao  Pulse Ox


 


 Last 24 Hr  97.6 F-98.7 F  66-88  18-18  100-140/49-80  98-98








PE


Neuro: alert, awake, cn 2-12intact


HEENT: no phonophobia, photophobia + blurry vision (L)


Pulm: CTAB


CV: S1 s2 rrr no mrg


Abd: s nt nd + bs


Ext: warm, no le edema 








 Laboratory Results - last 24 hr











  05/17/18 05/18/18 05/18/18





  14:15 06:00 06:00


 


WBC    5.2


 


RBC    2.96 L


 


Hgb    10.9


 


Hct    31.8 L


 


MCV    107.4 H


 


MCH    36.9 H


 


MCHC    34.4


 


RDW    18.3 H


 


Plt Count    241


 


MPV    7.6


 


Neutrophils %    No Result Required.


 


Lymphocytes %    No Result Required.


 


PT with INR  12.40  


 


INR  1.10  


 


PTT (Actin FS)  31.1  


 


Sodium   139 


 


Potassium   3.8 


 


Chloride   104 


 


Carbon Dioxide   26 


 


Anion Gap   9 


 


BUN   10 


 


Creatinine   0.4 L 


 


Creat Clearance w eGFR   > 60 


 


Random Glucose   134 H 


 


Calcium   8.3 L 


 


Phosphorus   3.0 


 


Magnesium   1.8 


 


Total Bilirubin   0.7 


 


AST   51 H 


 


ALT   95 H 


 


Alkaline Phosphatase   125 H 


 


Total Protein   6.3 L 


 


Albumin   3.3 L 








Active Medications











Generic Name Dose Route Start Last Admin





  Trade Name Freq  PRN Reason Stop Dose Admin


 


Acetaminophen  650 mg  05/17/18 16:13  





  Tylenol -  PO   





  Q4H PRN   





  HEADACHE   


 


Acetaminophen  325 mg  05/17/18 16:18  05/18/18 08:06





  Tylenol -  PO   325 mg





  Q8H PRN   Administration





  PAIN 4-6   


 


Dexamethasone  4 mg  05/17/18 16:15  05/17/18 21:59





  Decadron -  PO   4 mg





  QID ANTONIO   Administration


 


Heparin Sodium (Porcine)  5,000 unit  05/17/18 22:00  05/18/18 06:14





  Heparin -  SQ   5,000 unit





  TID ANTONIO   Administration


 


Sodium Chloride  1,000 mls @ 100 mls/hr  05/18/18 08:29  





  Normal Saline -  IV  05/18/18 16:29  





  ASDIR ANTONIO   


 


Oxycodone HCl  5 mg  05/17/18 16:18  05/18/18 08:05





  Roxicodone -  PO   5 mg





  Q8H PRN   Administration





  PAIN 4-6   


 


Propranolol HCl  40 mg  05/18/18 10:00  





  Inderal -  PO   





  DAILY ANTONIO   











MRI 5/14/2018: Brain MRI with/without ashley: Mild atrophy, R superior cerebellar 

broad/curvilinear folia and arachnoid enhancement visible on axial, coronal, 

and sagittal images; anterior cranial cervical junction enhancement;  no acute 

ischemia noted





Assessment: 67 year old female with Right metastatic breast ca admitted with HA 

and nausea





Plan: 





1. R breast CA, metastatic to bone and possibly brain?


- LP done now, r/o leptomeningeal disease


- Follow CSF glucose, protein, cytology and cell count 


- Give remained of fluids 100cc/hr 


- Keep flat for an hr 


- Continue decadron 4mg q6hr 


- CTAP, CT chest with contrast done- Reports pending


- d/w Neurology, neurosurgery


- Oncology seeing 





2. Benign central tremor


- Inderal 40mg daily 





3. DVT


- Heparin sq for today


- Change to lovenox tomorrow 





4. HA/ Nausea 


- Likely due to above 


- Tylenol, zofran, compazine prn  





Problem List





- Problems


(1) Breast cancer metastasized to bone


Code(s): C50.919 - MALIGNANT NEOPLASM OF UNSP SITE OF UNSPECIFIED FEMALE BREAST

; C79.51 - SECONDARY MALIGNANT NEOPLASM OF BONE   


Qualifiers: 


   Laterality: right   Qualified Code(s): C50.911 - Malignant neoplasm of 

unspecified site of right female breast; C79.51 - Secondary malignant neoplasm 

of bone; C79.51 - Secondary malignant neoplasm of bone; C79.51 - Secondary 

malignant neoplasm of bone; C79.51 - Secondary malignant neoplasm of bone   





(2) Headache


Code(s): R51 - HEADACHE   


Qualifiers: 


   Headache type: unspecified   Headache chronicity pattern: unspecified 

pattern   Intractability: intractable   Qualified Code(s): R51 - Headache   





(3) Leptomeningeal metastases


Code(s): C79.49 - SECONDARY MALIGNANT NEOPLASM OF OTH PARTS OF NERVOUS SYSTEM   





(4) Nausea


Code(s): R11.0 - NAUSEA   





Visit type





- Emergency Visit


Emergency Visit: Yes


ED Registration Date: 05/17/18


Care time: The patient presented to the Emergency Department on the above date 

and was hospitalized for further evaluation of their emergent condition.





- New Patient


This patient is new to me today: No





- Critical Care


Critical Care patient: No

## 2018-05-18 NOTE — PN
Progress Note (short form)





- Note


Progress Note: 


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 

(s/p genzar and carboplatin) diagnosed 2017 now on taxol, mammary carcinoma 

of lobular phenotype and LCIS with lymphovascular invasion (currently on 

chemotherapy and sees Dr. Romero), IBD, GERD, RLE thrombophlebitis, and 

osteoasthritis presented to Dr. Romero office for x1 week of headache and nausea

, vertigo and diarrhea/indigestion. Pt would have recieved her 5th dose today, 

she started about 1 month ago. 


mild left sided HA -- ? double vision though not an issue now. Sees optho- ? 

cataract L eye. 


no focal weakness, numbness, slurred speech. 





MRI reviewed. 


MRI/BRAIN MRI W W/O CONTRAST : Upon further review of the images, there is 

faint focal linear-like increased T2 signal intensity in superior aspect of the 

right cerebellum that appears to be enhancing on the postcontrast examination 

measuring approximately 11 mm in anterior-posterior length and 7 mm in width 

suggestive of focal cortical enhancement. There is also leptomeningeal 

enhancement within the superior cerebellar folia, bilaterally. There is 

leptomeningeal enhancement along posterior margin of the clivus as well as 

dural enhancement in the included portion of the upper cervical spine, mainly 

anteriorly up to upper C3 level. 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 

carcinomatosis, considering the clinical history of breast cancer with focal 

cortical involvement in the right cerebellum, superiorly. Please correlate with 

CSF fluid analysis and close follow-up MRI is recommended. Case discussed with 

Dr. Gabriel Verde and Dr. Edgar Romero, caring attending physician








FU : 





LP done this AM-- consnet in chart, PT in sitting position 


L4-L5 localized . sterilized, used 1% lidocaine for local anesthesia, retrieved 

clear CSF fluid, no OP measured


no post procedures complications, pt did well


fluid sent for routine CSF labs and cytology 





- Past Medical History


Gastrointestinal: Yes: GERD, Irritable Bowel Disease


Musculoskeletal: Yes: Osteoarthritis


Additional Medical History: H/O thrombophlebitis right LE





- Alcohol/Substance Use


Hx Alcohol Use: No


History of Substance Use: reports: None





- Smoking History


Smoking history: Never smoked


Have you smoked in the past 12 months: No





- Social History


ADL: Independent


Occupation: [employer] nurse 


History of Recent Travel: No





Home Medications





- Allergies


Allergies/Adverse Reactions: 


 Allergies











Allergy/AdvReac Type Severity Reaction Status Date / Time


 


adhesive tape Allergy  Rash Verified 18 10:23


 


No Known Drug Allergies Allergy   Verified 18 10:23














- Home Medications


Home Medications: 


Ambulatory Orders





Multivitamins [Tab-A-Vit -] 1 tab PO DAILY 17 


Ranitidine [Zantac -] 150 mg PO BID 17 


Vitamin C 500 mg PO DAILY 17 


Vitamin D - 1,000 unit PO DAILY 17 


Oxycodone HCl/Acetaminophen [Endocet 5-325 Tablet] 1 each PO Q8H PRN MDD 3  


Dexamethasone [Decadron] 4 mg PO TID 18 


Inderal - 40 mg PO DAILY 18 


Magnesium Oxide 500 mg PO BID 18 











Family Disease History





- Family Disease History


Family Disease History: CA: Grandparent (mat GM breast ca 50  57), 

Father (panceratic ca 82), Mother (CRC 46  57), Sister (mutiple meyloma)





Physical Exam-Neuro


Vital Signs: 


 


 Vital Signs











Temperature  97.7 F   18 06:00


 


Pulse Rate  88   18 06:00


 


Respiratory Rate  18   18 06:00


 


Blood Pressure  126/80   18 06:00


 


O2 Sat by Pulse Oximetry (%)  98   18 20:38











Labs: 


 INR, PTT











INR  1.10  (0.82-1.09)   18  14:15    














- Neuro Exam


Level Of Consciousness: Yes: Alert, Oriented to Person (EOMI, min esotropia L 

eye, slight aniscoria L >R --constrict, no facial, motor 5/5, reflexes 

symmetric )





Imaging





- Results


MRI: Report Reviewed, Image Reviewed





Problem List





- Problems


(1) Breast cancer metastasized to bone


Code(s): C50.919 - MALIGNANT NEOPLASM OF UNSP SITE OF UNSPECIFIED FEMALE BREAST

; C79.51 - SECONDARY MALIGNANT NEOPLASM OF BONE   


Qualifiers: 


   Laterality: right   Qualified Code(s): C50.911 - Malignant neoplasm of 

unspecified site of right female breast; C79.51 - Secondary malignant neoplasm 

of bone; C79.51 - Secondary malignant neoplasm of bone; C79.51 - Secondary 

malignant neoplasm of bone; C79.51 - Secondary malignant neoplasm of bone   





(2) Headache


Code(s): R51 - HEADACHE   


Qualifiers: 


   Headache type: unspecified   Headache chronicity pattern: unspecified 

pattern   Intractability: intractable   Qualified Code(s): R51 - Headache   





(3) Leptomeningeal metastases


Code(s): C79.49 - SECONDARY MALIGNANT NEOPLASM OF OTH PARTS OF NERVOUS SYSTEM   





(4) Nausea


Code(s): R11.0 - NAUSEA   





Assessment/Plan


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 

(s/p genzar and carboplatin) diagnosed 2017 now on taxol, mammary carcinoma 

of lobular phenotype and LCIS with lymphovascular invasion , HX of focal RT , (

currently on chemotherapy and sees Dr. Romero), IBD, GERD, RLE thrombophlebitis, 

and osteoasthritis presented to Dr. Romero office for x1-2 week of headache and 

nausea, vertigo and diarrhea/indigestion.


MRI shows ? enhancement R cerebellar area/ possible leptomeningeal disease --


spinal tap done to confirm 


on decadron 





LP done this AM -- sent for glucose, protein ,cell count and cytology 





will FU 





DR Stanford 














Problem List





- Problems


(1) Breast cancer metastasized to bone


Code(s): C50.919 - MALIGNANT NEOPLASM OF UNSP SITE OF UNSPECIFIED FEMALE BREAST

; C79.51 - SECONDARY MALIGNANT NEOPLASM OF BONE   


Qualifiers: 


   Laterality: right   Qualified Code(s): C50.911 - Malignant neoplasm of 

unspecified site of right female breast; C79.51 - Secondary malignant neoplasm 

of bone; C79.51 - Secondary malignant neoplasm of bone; C79.51 - Secondary 

malignant neoplasm of bone; C79.51 - Secondary malignant neoplasm of bone   





(2) Headache


Code(s): R51 - HEADACHE   


Qualifiers: 


   Headache type: unspecified   Headache chronicity pattern: unspecified 

pattern   Intractability: intractable   Qualified Code(s): R51 - Headache   





(3) Leptomeningeal metastases


Code(s): C79.49 - SECONDARY MALIGNANT NEOPLASM OF OTH PARTS OF NERVOUS SYSTEM   





(4) Nausea


Code(s): R11.0 - NAUSEA

## 2018-05-18 NOTE — PN
Progress Note (short form)





- Note


Progress Note: 





NEUROSURGERY CONSULT DICTATED





Consult requested by Dr Romero


H/o triple negative breast CT, chest wall and bone mets s/p L THR, c/o 

increasing H/A and blurriness (L visual fields) x 2 weeks; some hand tremor 

treated with inderal. Occ chill but never fever. R knee pain.


P: AF, VSS


HEENT- NC/At; Neck- supple; Cor- RRR; Lungs- CTA B;; Abd- benign; Ext- no sign 

of DVT


CN- intact; Motor- 4+-5 B UE/LE; Sensation- intact LT/vibration; DTR- 

hyporeflexic; Cerebellar- intact FTN B


INR 1.1, PTT 31.1,platelet 241k


Brain MRI with/without ashley(5-14): Mild atrophy, R superior cerebellar broad/

curvilinear folia and arachnoid enhancement visible on axial, coronal, and 

sagittal images; anterior cranial cervical junction enhancement;  no acute 

ischemia noted


Stage IV triple negative breast CA 


R/o leptomeningeal disease


Dr Stanford performing LP now

## 2018-05-19 LAB
ALBUMIN SERPL-MCNC: 3.6 G/DL (ref 3.4–5)
ALP SERPL-CCNC: 113 U/L (ref 45–117)
ALT SERPL-CCNC: 69 U/L (ref 12–78)
ANION GAP SERPL CALC-SCNC: 8 MMOL/L (ref 8–16)
AST SERPL-CCNC: 23 U/L (ref 15–37)
BILIRUB SERPL-MCNC: 0.6 MG/DL (ref 0.2–1)
BUN SERPL-MCNC: 12 MG/DL (ref 7–18)
CALCIUM SERPL-MCNC: 8.6 MG/DL (ref 8.5–10.1)
CHLORIDE SERPL-SCNC: 105 MMOL/L (ref 98–107)
CO2 SERPL-SCNC: 27 MMOL/L (ref 21–32)
CREAT SERPL-MCNC: 0.6 MG/DL (ref 0.55–1.02)
GLUCOSE SERPL-MCNC: 151 MG/DL (ref 74–106)
MAGNESIUM SERPL-MCNC: 1.7 MG/DL (ref 1.8–2.4)
POTASSIUM SERPLBLD-SCNC: 3.8 MMOL/L (ref 3.5–5.1)
PROT SERPL-MCNC: 6.9 G/DL (ref 6.4–8.2)
SODIUM SERPL-SCNC: 140 MMOL/L (ref 136–145)

## 2018-05-19 RX ADMIN — DEXAMETHASONE SCH MG: 4 TABLET ORAL at 09:19

## 2018-05-19 RX ADMIN — DEXAMETHASONE SCH MG: 4 TABLET ORAL at 15:15

## 2018-05-19 RX ADMIN — DEXAMETHASONE SCH MG: 4 TABLET ORAL at 19:23

## 2018-05-19 RX ADMIN — PANTOPRAZOLE SODIUM SCH MG: 40 TABLET, DELAYED RELEASE ORAL at 09:19

## 2018-05-19 RX ADMIN — HEPARIN SODIUM SCH UNIT: 5000 INJECTION, SOLUTION INTRAVENOUS; SUBCUTANEOUS at 15:15

## 2018-05-19 RX ADMIN — DEXAMETHASONE SCH MG: 4 TABLET ORAL at 22:47

## 2018-05-19 RX ADMIN — HEPARIN SODIUM SCH UNIT: 5000 INJECTION, SOLUTION INTRAVENOUS; SUBCUTANEOUS at 22:48

## 2018-05-19 NOTE — PN
Progress Note (short form)





- Note


Progress Note: 





NEUROSURGERY CONSULT DICTATED








H/a better, no nausea, no stiff neck, slight blurriness still


P: AF, VSS


HEENT- NC/At; Neck- supple; Cor- RRR; Lungs- CTA B;; Abd- benign; Ext- no sign 

of DVT


CN- intact; Motor- 4+-5 B UE/LE; Sensation- intact LT/vibration; DTR- 

hyporeflexic; Cerebellar- intact FTN B


CSF G 51/P 214, 5 WBC, 5 RBC; gram stain negative


Brain MRI with/without ashley(5-14): Mild atrophy, R superior cerebellar broad/

curvilinear folia and arachnoid enhancement visible on axial, coronal, and 

sagittal images; anterior cranial cervical junction enhancement;  no acute 

ischemia noted


Stage IV triple negative breast CA 


R/o leptomeningeal disease, CSF cytology pending

## 2018-05-19 NOTE — PN
Physical Exam: 


SUBJECTIVE: Patient seen and examined. C/o HA, 2/10, HA comes and goes, but 

persistently present. 








OBJECTIVE:





 Vital Signs











 Period  Temp  Pulse  Resp  BP Sys/Zhao  Pulse Ox


 


 Last 24 Hr  98.0 F-98.5 F  71-79  18-18  116-136/71-81  95








PE


Neuro: alert, awake, cn 2-12intact


Pulm: CTAB


CV: S1 s2 rrr no mrg


Breast: r breast changes, hard, red, 


Abd: s nt nd + bs


Ext: warm, no le edema 














 Laboratory Results - last 24 hr











  05/18/18





  08:30


 


CSF Appearance  Clear


 


CSF Color  Colorless


 


CSF WBC  5


 


CSF RBC  5


 


CSF Neutrophils  No Result Required.


 


CSF Lymphocytes  No Result Required.


 


CSF Eosinophils  No Result Required.


 


CSF Basophils  No Result Required.


 


CSF Macrophages  No Result Required.


 


CSF Plasma Cells  No Result Required.


 


CSF Diff Comment  No Result Required.


 


CSF Comment  No Result Required.


 


CSF Glucose  51


 


CSF Total Protein  214 H








Active Medications











Generic Name Dose Route Start Last Admin





  Trade Name Freq  PRN Reason Stop Dose Admin


 


Acetaminophen  650 mg  05/17/18 16:13  05/18/18 13:05





  Tylenol -  PO   650 mg





  Q4H PRN   Administration





  HEADACHE   


 


Acetaminophen  325 mg  05/17/18 16:18  05/18/18 08:06





  Tylenol -  PO   325 mg





  Q8H PRN   Administration





  PAIN 4-6   


 


Dexamethasone  4 mg  05/17/18 16:15  05/19/18 09:19





  Decadron -  PO   4 mg





  QID ANTONIO   Administration


 


Heparin Sodium (Porcine)  5,000 unit  05/19/18 14:00  





  Heparin -  SQ   





  TID ANTONIO   


 


Oxycodone HCl  5 mg  05/17/18 16:18  05/18/18 22:07





  Roxicodone -  PO   5 mg





  Q8H PRN   Administration





  PAIN 4-6   


 


Pantoprazole Sodium  40 mg  05/18/18 18:45  05/19/18 09:19





  Protonix -  PO   40 mg





  DAILY ANTONIO   Administration


 


Propranolol HCl  40 mg  05/18/18 10:00  05/19/18 09:19





  Inderal -  PO   40 mg





  DAILY ANTONIO   Administration








Imaging: 


- MRI 5/14/2018: Brain MRI with/without ashley: Mild atrophy, R superior 

cerebellar broad/curvilinear folia and arachnoid enhancement visible on axial, 

coronal, and sagittal images; anterior cranial cervical junction enhancement;  

no acute ischemia noted





Assessment: 67 year old female with Right metastatic breast ca admitted with HA 

and nausea





Plan: 





1. R breast CA, metastatic to bone and possibly brain?


- LP 5/18, r/o leptomeningeal disease


- CSF results noted 


- Will likely need additional LP's before diagnosis can be made


- Continue decadron 4mg q6hr 


- CTAP shows hepatic lobe density, c/w neoplasm 





2. Benign central tremor


- Inderal 40mg daily 





3. DVT


- Heparin sq 





4. HA/ Nausea 


- Likely due to above 


- Tylenol, zofran, compazine prn  








Problem List





- Problems


(1) Breast cancer metastasized to bone


Code(s): C50.919 - MALIGNANT NEOPLASM OF UNSP SITE OF UNSPECIFIED FEMALE BREAST

; C79.51 - SECONDARY MALIGNANT NEOPLASM OF BONE   


Qualifiers: 


   Laterality: right   Qualified Code(s): C50.911 - Malignant neoplasm of 

unspecified site of right female breast; C79.51 - Secondary malignant neoplasm 

of bone; C79.51 - Secondary malignant neoplasm of bone; C79.51 - Secondary 

malignant neoplasm of bone; C79.51 - Secondary malignant neoplasm of bone   





(2) Headache


Code(s): R51 - HEADACHE   


Qualifiers: 


   Headache type: unspecified   Headache chronicity pattern: unspecified 

pattern   Intractability: intractable   Qualified Code(s): R51 - Headache   





(3) Leptomeningeal metastases


Code(s): C79.49 - SECONDARY MALIGNANT NEOPLASM OF OTH PARTS OF NERVOUS SYSTEM   





(4) Nausea


Code(s): R11.0 - NAUSEA   





Visit type





- Emergency Visit


Emergency Visit: Yes


ED Registration Date: 05/17/18


Care time: The patient presented to the Emergency Department on the above date 

and was hospitalized for further evaluation of their emergent condition.





- New Patient


This patient is new to me today: No





- Critical Care


Critical Care patient: No

## 2018-05-19 NOTE — PN
Progress Note (short form)





- Note


Progress Note: 





Hematology/Oncology Follow-up Note





S: Patient says she has a mild headache, much improved from yesterday. She also 

does not complaint of diplopia today- says eyesight is at baseline with her 

cataracts.





 Last Vital Signs











Temp Pulse Resp BP Pulse Ox


 


 98.5 F   71   18   133/81   95 


 


 05/19/18 06:14  05/19/18 06:14  05/19/18 06:14  05/19/18 06:14  05/18/18 21:00











Physical Exam :


AOx3 pleasant


No nystagmus


Neck wnl


CTA (BL)


S1S2 wnl


No c/c/e














 CBC, BMP





 05/18/18 06:00 





 05/19/18 10:00 





 Current Medications











Generic Name Dose Route Start Last Admin





  Trade Name Freq  PRN Reason Stop Dose Admin


 


Acetaminophen  650 mg  05/17/18 16:13  05/18/18 13:05





  Tylenol -  PO   650 mg





  Q4H PRN   Administration





  HEADACHE   


 


Acetaminophen  325 mg  05/17/18 16:18  05/18/18 08:06





  Tylenol -  PO   325 mg





  Q8H PRN   Administration





  PAIN 4-6   


 


Dexamethasone  4 mg  05/17/18 16:15  05/19/18 09:19





  Decadron -  PO   4 mg





  QID ANTONIO   Administration


 


Heparin Sodium (Porcine)  5,000 unit  05/19/18 14:00  





  Heparin -  SQ   





  TID ANTONIO   


 


Oxycodone HCl  5 mg  05/17/18 16:18  05/18/18 22:07





  Roxicodone -  PO   5 mg





  Q8H PRN   Administration





  PAIN 4-6   


 


Pantoprazole Sodium  40 mg  05/18/18 18:45  05/19/18 09:19





  Protonix -  PO   40 mg





  DAILY ANTONIO   Administration


 


Propranolol HCl  40 mg  05/18/18 10:00  05/19/18 09:19





  Inderal -  PO   40 mg





  DAILY ANTONIO   Administration








A/P : 68 y/o female with triple negative breast cancer now metastatic on weekly 

taxol, MRI findings of subtle leptomeningeal enhancement


-Continue Decadron at current dose, patient has had a symptomatic improvement


-continue protonix


-cotinue heparin


-awaiting CSF cytology

## 2018-05-20 LAB
ALBUMIN SERPL-MCNC: 3.2 G/DL (ref 3.4–5)
ALP SERPL-CCNC: 95 U/L (ref 45–117)
ALT SERPL-CCNC: 50 U/L (ref 12–78)
ANION GAP SERPL CALC-SCNC: 9 MMOL/L (ref 8–16)
AST SERPL-CCNC: 17 U/L (ref 15–37)
BILIRUB SERPL-MCNC: 0.2 MG/DL (ref 0.2–1)
BUN SERPL-MCNC: 16 MG/DL (ref 7–18)
CALCIUM SERPL-MCNC: 8.5 MG/DL (ref 8.5–10.1)
CHLORIDE SERPL-SCNC: 107 MMOL/L (ref 98–107)
CO2 SERPL-SCNC: 26 MMOL/L (ref 21–32)
CREAT SERPL-MCNC: 0.5 MG/DL (ref 0.55–1.02)
GLUCOSE SERPL-MCNC: 108 MG/DL (ref 74–106)
MAGNESIUM SERPL-MCNC: 2 MG/DL (ref 1.8–2.4)
POTASSIUM SERPLBLD-SCNC: 4 MMOL/L (ref 3.5–5.1)
PROT SERPL-MCNC: 6.1 G/DL (ref 6.4–8.2)
SODIUM SERPL-SCNC: 142 MMOL/L (ref 136–145)

## 2018-05-20 RX ADMIN — HEPARIN SODIUM SCH UNIT: 5000 INJECTION, SOLUTION INTRAVENOUS; SUBCUTANEOUS at 22:30

## 2018-05-20 RX ADMIN — DEXAMETHASONE SCH MG: 4 TABLET ORAL at 10:56

## 2018-05-20 RX ADMIN — ACETAMINOPHEN PRN MG: 325 TABLET ORAL at 10:57

## 2018-05-20 RX ADMIN — HEPARIN SODIUM SCH UNIT: 5000 INJECTION, SOLUTION INTRAVENOUS; SUBCUTANEOUS at 05:53

## 2018-05-20 RX ADMIN — MAGNESIUM OXIDE TAB 400 MG (241.3 MG ELEMENTAL MG) SCH MG: 400 (241.3 MG) TAB at 22:30

## 2018-05-20 RX ADMIN — ACETAMINOPHEN PRN MG: 325 TABLET ORAL at 16:27

## 2018-05-20 RX ADMIN — DEXAMETHASONE SCH MG: 4 TABLET ORAL at 15:15

## 2018-05-20 RX ADMIN — HEPARIN SODIUM SCH UNIT: 5000 INJECTION, SOLUTION INTRAVENOUS; SUBCUTANEOUS at 15:19

## 2018-05-20 RX ADMIN — DEXAMETHASONE SCH MG: 4 TABLET ORAL at 22:30

## 2018-05-20 RX ADMIN — DEXAMETHASONE SCH MG: 4 TABLET ORAL at 17:38

## 2018-05-20 RX ADMIN — PANTOPRAZOLE SODIUM SCH MG: 40 TABLET, DELAYED RELEASE ORAL at 10:56

## 2018-05-20 NOTE — PN
Progress Note (short form)





- Note


Progress Note: 





NEUROSURGERY 








Mild frontal H/A, no nausea, no stiff neck, slight blurriness 


On steroid, no significant GI effects


P: AF, VSS


HEENT- NC/At; Neck- supple; Cor- RRR; Lungs- CTA B;; Abd- benign; Ext- no sign 

of DVT


CN- intact; Motor- 4+-5 B UE/LE; Sensation- intact LT/vibration; DTR- 

hyporeflexic; Cerebellar- intact FTN B; Gait- stable


CSF G 51/P 214, 5 WBC, 5 RBC; gram stain negative; culture pending


Brain MRI with/without ashley(5-14): Mild atrophy, R superior cerebellar broad/

curvilinear folia and arachnoid enhancement visible on axial, coronal, and 

sagittal images; anterior cranial cervical junction enhancement;  no acute 

ischemia noted


Stage IV triple negative breast CA 


R/o leptomeningeal disease, CSF protein elevation, CSF cytology pending

## 2018-05-20 NOTE — PN
Physical Exam: 


SUBJECTIVE: Patient seen and examined. HA and diplopia unchanged, slept fairly 

well aside from awaking to pee from decadron 








OBJECTIVE:





 Vital Signs











 Period  Temp  Pulse  Resp  BP Sys/Zhao  Pulse Ox


 


 Last 24 Hr  98.2 F-98.4 F  67-67  18-20  123-131/68-76  95








PE


Neuro: alert, awake, cn 2-12intact


Pulm: CTAB


CV: S1 s2 rrr no mrg, LCW port 


Breast: r breast changes, hard, red 


Abd: s nt nd + bs


Ext: warm, no le edema 





 Laboratory Results - last 24 hr











  05/19/18 05/20/18





  10:00 06:00


 


Sodium  140  142


 


Potassium  3.8  4.0


 


Chloride  105  107


 


Carbon Dioxide  27 


 


Anion Gap  8 


 


BUN  12 


 


Creatinine  0.6 


 


Creat Clearance w eGFR  > 60 


 


Random Glucose  151 H 


 


Calcium  8.6 


 


Magnesium  1.7 L 


 


Total Bilirubin  0.6 


 


AST  23 


 


ALT  69 


 


Alkaline Phosphatase  113 


 


Total Protein  6.9 


 


Albumin  3.6 











REMAINING AM LABS PENDING 





Active Medications











Generic Name Dose Route Start Last Admin





  Trade Name Willamq  PRN Reason Stop Dose Admin


 


Acetaminophen  650 mg  05/17/18 16:13  05/18/18 13:05





  Tylenol -  PO   650 mg





  Q4H PRN   Administration





  HEADACHE   


 


Acetaminophen  325 mg  05/17/18 16:18  05/18/18 08:06





  Tylenol -  PO   325 mg





  Q8H PRN   Administration





  PAIN 4-6   


 


Dexamethasone  4 mg  05/17/18 16:15  05/19/18 22:47





  Decadron -  PO   4 mg





  QID ANTONIO   Administration


 


Heparin Sodium (Porcine)  5,000 unit  05/19/18 14:00  05/20/18 05:53





  Heparin -  SQ   5,000 unit





  TID ANTONIO   Administration


 


Magnesium Oxide  400 mg  05/20/18 22:00  





  Mag-Ox -  PO   





  BID ANTONIO   


 


Oxycodone HCl  5 mg  05/17/18 16:18  05/19/18 20:22





  Roxicodone -  PO   5 mg





  Q8H PRN   Administration





  PAIN 4-6   


 


Pantoprazole Sodium  40 mg  05/18/18 18:45  05/19/18 09:19





  Protonix -  PO   40 mg





  DAILY ANTONIO   Administration


 


Propranolol HCl  40 mg  05/18/18 10:00  05/19/18 09:19





  Inderal -  PO   40 mg





  DAILY ANTONIO   Administration











Imaging: 


- MRI 5/14/2018: Brain MRI with/without ashley: Mild atrophy, R superior 

cerebellar broad/curvilinear folia and arachnoid enhancement visible on axial, 

coronal, and sagittal images; anterior cranial cervical junction enhancement;  

no acute ischemia noted


- CTAP shows hepatic lobe density, c/w neoplasm 





Assessment: 67 year old female with Right metastatic breast ca admitted with HA 

and nausea





Plan: 





1. R breast CA, metastatic to bone and possibly brain?


- LP 5/18, r/o leptomeningeal disease


- CSF with elevated protein, CSF cytology pending 


- Will likely need additional LP's before diagnosis can be made


- Continue decadron 4mg q6hr


- Protonix 


- Daily mg supplementation  





2. Benign central tremor


- Due to chemo 


- Inderal 40mg daily 





3. DVT


- Heparin sq 





4. HA/ Nausea 


- Due to above 


- Tylenol, zofran, compazine prn  








Problem List





- Problems


(1) Breast cancer metastasized to bone


Code(s): C50.919 - MALIGNANT NEOPLASM OF UNSP SITE OF UNSPECIFIED FEMALE BREAST

; C79.51 - SECONDARY MALIGNANT NEOPLASM OF BONE   


Qualifiers: 


   Laterality: right   Qualified Code(s): C50.911 - Malignant neoplasm of 

unspecified site of right female breast; C79.51 - Secondary malignant neoplasm 

of bone; C79.51 - Secondary malignant neoplasm of bone; C79.51 - Secondary 

malignant neoplasm of bone; C79.51 - Secondary malignant neoplasm of bone   





(2) Headache


Code(s): R51 - HEADACHE   


Qualifiers: 


   Headache type: unspecified   Headache chronicity pattern: unspecified 

pattern   Intractability: intractable   Qualified Code(s): R51 - Headache   





(3) Leptomeningeal metastases


Code(s): C79.49 - SECONDARY MALIGNANT NEOPLASM OF OTH PARTS OF NERVOUS SYSTEM   





(4) Nausea


Code(s): R11.0 - NAUSEA   





Visit type





- Emergency Visit


Emergency Visit: Yes


ED Registration Date: 05/17/18


Care time: The patient presented to the Emergency Department on the above date 

and was hospitalized for further evaluation of their emergent condition.





- New Patient


This patient is new to me today: No





- Critical Care


Critical Care patient: No

## 2018-05-20 NOTE — PN
Progress Note, Physician


Chief Complaint: 


headache and diplopia








History of Present Illness: 


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 

(s/p genzar and carboplatin) diagnosed 9/1/2017 now on taxol, mammary carcinoma 

of lobular phenotype and LCIS with lymphovascular invasion (currently on 

chemotherapy and sees Dr. Romero), IBD, GERD, RLE thrombophlebitis, and 

osteoasthritis presented to Dr. Romero office for x1 week of headache and nausea

, vertigo and diarrhea/indigestion. Pt would have recieved her 5th dose couple 

days ago, she started about 1 month ago. 


mild left sided HA -- ? double vision though not an issue now. Sees optho- ? 

cataract L eye. 


no focal weakness, numbness, slurred speech. 


Under LP yesterday by Abdoulaye.  











- Current Medication List


Current Medications: 


Active Medications





Acetaminophen (Tylenol -)  650 mg PO Q4H PRN


   PRN Reason: HEADACHE


   Last Admin: 05/20/18 10:57 Dose:  650 mg


Acetaminophen (Tylenol -)  325 mg PO Q8H PRN


   PRN Reason: PAIN 4-6


   Last Admin: 05/18/18 08:06 Dose:  325 mg


Dexamethasone (Decadron -)  4 mg PO QID [facility]


   Last Admin: 05/20/18 10:56 Dose:  4 mg


Heparin Sodium (Porcine) (Heparin -)  5,000 unit SQ TID [facility]


   Last Admin: 05/20/18 05:53 Dose:  5,000 unit


Magnesium Oxide (Mag-Ox -)  400 mg PO BID [facility]


Oxycodone HCl (Roxicodone -)  5 mg PO Q8H PRN


   PRN Reason: PAIN 4-6


   Last Admin: 05/20/18 10:58 Dose:  5 mg


Pantoprazole Sodium (Protonix -)  40 mg PO DAILY [facility]


   Last Admin: 05/20/18 10:56 Dose:  40 mg


Propranolol HCl (Inderal -)  40 mg PO DAILY [facility]


   Last Admin: 05/20/18 10:58 Dose:  40 mg











- Objective


Vital Signs: 


 Vital Signs











Temperature  98.5 F   05/20/18 10:00


 


Pulse Rate  90   05/20/18 10:00


 


Respiratory Rate  20   05/20/18 10:00


 


Blood Pressure  136/78   05/20/18 10:00


 


O2 Sat by Pulse Oximetry (%)  95   05/19/18 21:00











Neurological: Yes: Alert, Oriented, Cran Nerves II-XII Intact


Labs: 


 CBC, BMP





 05/18/18 06:00 





 05/20/18 06:00 





 INR, PTT











INR  1.10  (0.82-1.09)   05/17/18  14:15    














Problem List





- Problems


(1) Breast cancer metastasized to bone


Code(s): C50.919 - MALIGNANT NEOPLASM OF UNSP SITE OF UNSPECIFIED FEMALE BREAST

; C79.51 - SECONDARY MALIGNANT NEOPLASM OF BONE   


Qualifiers: 


   Laterality: right   Qualified Code(s): C50.911 - Malignant neoplasm of 

unspecified site of right female breast; C79.51 - Secondary malignant neoplasm 

of bone; C79.51 - Secondary malignant neoplasm of bone; C79.51 - Secondary 

malignant neoplasm of bone; C79.51 - Secondary malignant neoplasm of bone   





(2) Headache


Code(s): R51 - HEADACHE   


Qualifiers: 


   Headache type: unspecified   Headache chronicity pattern: unspecified 

pattern   Intractability: intractable   Qualified Code(s): R51 - Headache   





(3) Leptomeningeal metastases


Code(s): C79.49 - SECONDARY MALIGNANT NEOPLASM OF OTH PARTS OF NERVOUS SYSTEM   





Assessment/Plan


CSF cytology pending but elevated protein is highly suspicious.  F/U cytology 

as available.

## 2018-05-21 LAB
ALBUMIN SERPL-MCNC: 3.4 G/DL (ref 3.4–5)
ALP SERPL-CCNC: 97 U/L (ref 45–117)
ALT SERPL-CCNC: 47 U/L (ref 12–78)
ANION GAP SERPL CALC-SCNC: 7 MMOL/L (ref 8–16)
AST SERPL-CCNC: 21 U/L (ref 15–37)
BILIRUB SERPL-MCNC: 0.2 MG/DL (ref 0.2–1)
BUN SERPL-MCNC: 18 MG/DL (ref 7–18)
CALCIUM SERPL-MCNC: 8.9 MG/DL (ref 8.5–10.1)
CHLORIDE SERPL-SCNC: 107 MMOL/L (ref 98–107)
CO2 SERPL-SCNC: 29 MMOL/L (ref 21–32)
CREAT SERPL-MCNC: 0.5 MG/DL (ref 0.55–1.02)
DEPRECATED RDW RBC AUTO: 18.7 % (ref 11.6–15.6)
GLUCOSE SERPL-MCNC: 100 MG/DL (ref 74–106)
HCT VFR BLD CALC: 32.3 % (ref 32.4–45.2)
HGB BLD-MCNC: 10.8 GM/DL (ref 10.7–15.3)
MAGNESIUM SERPL-MCNC: 1.9 MG/DL (ref 1.8–2.4)
MCH RBC QN AUTO: 36.3 PG (ref 25.7–33.7)
MCHC RBC AUTO-ENTMCNC: 33.6 G/DL (ref 32–36)
MCV RBC: 108 FL (ref 80–96)
PLATELET # BLD AUTO: 230 K/MM3 (ref 134–434)
PMV BLD: 7.2 FL (ref 7.5–11.1)
POTASSIUM SERPLBLD-SCNC: 3.9 MMOL/L (ref 3.5–5.1)
PROT SERPL-MCNC: 6.2 G/DL (ref 6.4–8.2)
RBC # BLD AUTO: 2.99 M/MM3 (ref 3.6–5.2)
SODIUM SERPL-SCNC: 143 MMOL/L (ref 136–145)
WBC # BLD AUTO: 9.8 K/MM3 (ref 4–10)

## 2018-05-21 RX ADMIN — MAGNESIUM OXIDE TAB 400 MG (241.3 MG ELEMENTAL MG) SCH MG: 400 (241.3 MG) TAB at 22:37

## 2018-05-21 RX ADMIN — HEPARIN SODIUM SCH UNIT: 5000 INJECTION, SOLUTION INTRAVENOUS; SUBCUTANEOUS at 13:30

## 2018-05-21 RX ADMIN — DEXAMETHASONE SCH MG: 4 TABLET ORAL at 09:12

## 2018-05-21 RX ADMIN — DEXAMETHASONE SCH MG: 4 TABLET ORAL at 17:07

## 2018-05-21 RX ADMIN — ACETAMINOPHEN PRN MG: 325 TABLET ORAL at 09:46

## 2018-05-21 RX ADMIN — HEPARIN SODIUM SCH UNIT: 5000 INJECTION, SOLUTION INTRAVENOUS; SUBCUTANEOUS at 06:01

## 2018-05-21 RX ADMIN — DEXAMETHASONE SCH MG: 4 TABLET ORAL at 13:30

## 2018-05-21 RX ADMIN — MAGNESIUM OXIDE TAB 400 MG (241.3 MG ELEMENTAL MG) SCH MG: 400 (241.3 MG) TAB at 09:11

## 2018-05-21 RX ADMIN — DEXAMETHASONE SCH MG: 4 TABLET ORAL at 22:37

## 2018-05-21 RX ADMIN — PANTOPRAZOLE SODIUM SCH MG: 40 TABLET, DELAYED RELEASE ORAL at 09:11

## 2018-05-21 RX ADMIN — HEPARIN SODIUM SCH: 5000 INJECTION, SOLUTION INTRAVENOUS; SUBCUTANEOUS at 22:38

## 2018-05-21 NOTE — PN
Progress Note (short form)





- Note


Progress Note: 





NEUROSURGERY 








Mild L frontal H/A, no nausea, no stiff neck, slight blurriness OS


On steroid, no significant GI effects


Tolerating diet well


PE: Tmax 98.1, AF, VSS


HEENT- NC/AT; Neck- supple; Cor- RRR; Lungs- CTA B;; Abd- benign; Ext- no sign 

of DVT


CN- intact except mild L CN VI palsy on lateral gaze; Motor- 4+-5 B UE/LE; 

Sensation- intact LT/vibration; DTR- hyporeflexic; Cerebellar- intact FTN B; 

Gait- stable


CSF G 51/P 214, 5 WBC, 5 RBC; gram stain negative


Brain MRI with/without ashley(5-14): Mild atrophy, R superior cerebellar broad/

curvilinear folia and arachnoid enhancement visible on axial, coronal, and 

sagittal images; anterior cranial cervical junction enhancement;  no acute 

ischemia noted


Stage IV triple negative breast CA 


R/o leptomeningeal disease


Check CSF cytology 


Further tx per med oncology

## 2018-05-21 NOTE — PN
Progress Note (short form)





- Note


Progress Note: 





Subjective: The patient was seen at the bedside, she has no complaints at this 

time. 


For lumbar puncture for cytology tomorrow with IR





 Current Medications











Generic Name Dose Route Start Last Admin





  Trade Name Freq  PRN Reason Stop Dose Admin


 


Acetaminophen  650 mg  05/17/18 16:13  05/21/18 09:46





  Tylenol -  PO   650 mg





  Q4H PRN   Administration





  HEADACHE   


 


Acetaminophen  325 mg  05/17/18 16:18  05/18/18 08:06





  Tylenol -  PO   325 mg





  Q8H PRN   Administration





  PAIN 1-3   


 


Dexamethasone  4 mg  05/17/18 16:15  05/21/18 13:30





  Decadron -  PO   4 mg





  QID ANTONIO   Administration


 


Heparin Sodium (Porcine)  5,000 unit  05/19/18 14:00  05/21/18 13:30





  Heparin -  SQ   5,000 unit





  TID ANTONIO   Administration


 


Magnesium Oxide  400 mg  05/20/18 22:00  05/21/18 09:11





  Mag-Ox -  PO   400 mg





  BID ANTONIO   Administration


 


Oxycodone HCl  5 mg  05/20/18 16:11  05/21/18 10:58





  Roxicodone -  PO   5 mg





  Q4H PRN   Administration





  PAIN 4-6   


 


Oxycodone HCl  10 mg  05/21/18 13:04  





  Roxicodone -  PO   





  Q4H PRN   





  PAIN LEVEL 6-10   


 


Pantoprazole Sodium  40 mg  05/18/18 18:45  05/21/18 09:11





  Protonix -  PO   40 mg





  DAILY ANTONIO   Administration


 


Propranolol HCl  40 mg  05/18/18 10:00  05/21/18 09:12





  Inderal -  PO   40 mg





  DAILY ANTONIO   Administration








Objective: 


 Vital Signs











 Period  Temp  Pulse  Resp  BP Sys/Zhao  Pulse Ox


 


 Last 24 Hr  97.7 F-98.2 F  70-84  18-18  126-135/78-84  95








Physical Exam: 


General: NAD, A&Ox3





 CBCD











WBC  9.8 K/mm3 (4.0-10.0)  D 05/21/18  06:00    


 


RBC  2.99 M/mm3 (3.60-5.2)  L  05/21/18  06:00    


 


Hgb  10.8 GM/dL (10.7-15.3)   05/21/18  06:00    


 


Hct  32.3 % (32.4-45.2)  L  05/21/18  06:00    


 


MCV  108.0 fl (80-96)  H  05/21/18  06:00    


 


MCHC  33.6 g/dl (32.0-36.0)   05/21/18  06:00    


 


RDW  18.7 % (11.6-15.6)  H  05/21/18  06:00    


 


Plt Count  230 K/MM3 (134-434)   05/21/18  06:00    


 


MPV  7.2 fl (7.5-11.1)  L  05/21/18  06:00    








 CMP











Sodium  143 mmol/L (136-145)   05/21/18  06:00    


 


Potassium  3.9 mmol/L (3.5-5.1)   05/21/18  06:00    


 


Chloride  107 mmol/L ()   05/21/18  06:00    


 


Carbon Dioxide  29 mmol/L (21-32)   05/21/18  06:00    


 


Anion Gap  7  (8-16)  L  05/21/18  06:00    


 


BUN  18 mg/dL (7-18)   05/21/18  06:00    


 


Creatinine  0.5 mg/dL (0.55-1.02)  L  05/21/18  06:00    


 


Creat Clearance w eGFR  > 60  (>60)   05/21/18  06:00    


 


Random Glucose  100 mg/dL ()   05/21/18  06:00    


 


Calcium  8.9 mg/dL (8.5-10.1)   05/21/18  06:00    


 


Total Bilirubin  0.2 mg/dL (0.2-1.0)   05/21/18  06:00    


 


AST  21 U/L (15-37)   05/21/18  06:00    


 


ALT  47 U/L (12-78)   05/21/18  06:00    


 


Alkaline Phosphatase  97 U/L ()   05/21/18  06:00    


 


Total Protein  6.2 g/dl (6.4-8.2)  L  05/21/18  06:00    


 


Albumin  3.4 g/dl (3.4-5.0)   05/21/18  06:00    








 Microbiology





05/18/18 08:30   Cerebral Spinal Fluid - Lumbar Puncture   Gram Stain - Final





Imaging: 


- MRI brain (5/14/18): There is faint focal linear-like increased T2 signal 

intensity in superior aspect of the right cerebellum that appears to be 

enhancing on the post contrast examination. There is also leptomeningeal 

enhancement within the superior cerebellar folia bilaterally. Leptomeningeal 

enchancement along posterior margin of the clivus as well as dural enhancement 

in the included portion of the upper cervical spine





Assessment: This is a 67 year old female with PMHx of osteoarthritis, GERD, 

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 

presented to the ED with headache, nausea, vertigo, and diarrhea/indigestion x1 

week. 





Plan: 


1) Metastatic right breast cancer


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


- For LP tomorrow with cytology


- Possible Ommaya and IT therapy, to be further discussed with oncology


- Continue Decadron


- Pain management


- Appreciate oncology consult





2) F/E/N:


- Regular diet


- Monitor electrolytes





3) Prophylaxis: 


- OOB ambulating


- Heparin 5,000u sq tid 





4) Dispo:


- Requires continued inpatient care





CODE STATUS: FULL CODE





Visit type





- Emergency Visit


Emergency Visit: Yes


ED Registration Date: 05/17/18


Care time: The patient presented to the Emergency Department on the above date 

and was hospitalized for further evaluation of their emergent condition.





- New Patient


This patient is new to me today: Yes


Date on this admission: 05/21/18





- Critical Care


Critical Care patient: No

## 2018-05-21 NOTE — PN
Progress Note (short form)





- Note


Progress Note: 





Patient seen and examined





Lengthy discussion with patient and with  in attendance. 


Suspicion of leptomeningeal disease. Discussed implications and possible 

treatment including high dose MTX with citrovorum rescue  or IT Methotrexate  

via Ommaya . 


Initial spinal tap- Protein elevated , glucose normal. 


Lab did not do cell count , and no fluid received in path  for cytology. 


Repeat LP planned for 5/22. 


Reviewed  results of CT scans  with patient  with bone,  harvey, soft tissue, 

chest wall, and possible liver mets and periampullary soft tissue mass.


Patient appropriately upset . 


 Last Vital Signs











Temp Pulse Resp BP Pulse Ox


 


 98.2 F   83   18   133/80   95 


 


 05/21/18 09:00  05/21/18 09:00  05/21/18 09:00  05/21/18 09:00  05/21/18 09:00














HEENT: JOSEPH, EOM Intact, inaability to focus 


Breasts: erythema, edema, chest wall nodularity 


Cor: RSR, No murmurs, No gallops


Lungs: Clear to P&A


Abd: Soft, Normal bowel sounds, No organomegaly


Ext:No significant edema


Skin: chest wall involvement


 CBC, BMP





 05/21/18 06:00 





 05/21/18 06:00 





 Current Medications











Generic Name Dose Route Start Last Admin





  Trade Name Freq  PRN Reason Stop Dose Admin


 


Acetaminophen  650 mg  05/17/18 16:13  05/21/18 09:46





  Tylenol -  PO   650 mg





  Q4H PRN   Administration





  HEADACHE   


 


Acetaminophen  325 mg  05/17/18 16:18  05/18/18 08:06





  Tylenol -  PO   325 mg





  Q8H PRN   Administration





  PAIN 1-3   


 


Dexamethasone  4 mg  05/17/18 16:15  05/21/18 09:12





  Decadron -  PO   4 mg





  QID ANTONIO   Administration


 


Heparin Sodium (Porcine)  5,000 unit  05/19/18 14:00  05/21/18 06:01





  Heparin -  SQ   5,000 unit





  TID ANTONIO   Administration


 


Magnesium Oxide  400 mg  05/20/18 22:00  05/21/18 09:11





  Mag-Ox -  PO   400 mg





  BID ANTONIO   Administration


 


Oxycodone HCl  5 mg  05/20/18 16:11  05/21/18 10:58





  Roxicodone -  PO   5 mg





  Q4H PRN   Administration





  PAIN 4-6   


 


Pantoprazole Sodium  40 mg  05/18/18 18:45  05/21/18 09:11





  Protonix -  PO   40 mg





  DAILY ANTONIO   Administration


 


Propranolol HCl  40 mg  05/18/18 10:00  05/21/18 09:12





  Inderal -  PO   40 mg





  DAILY ANTONIO   Administration








Impression :


Triple negative breast ca.


 ?? Leptomeningeal involvement


 Chest wall bone, spine, possible liver involvement. 





Plan:





Repeat LP


Possble Ommaya and IT  therapy


Systemic therapy. 


Increase  oxycodone.

## 2018-05-22 LAB
ANION GAP SERPL CALC-SCNC: 6 MMOL/L (ref 8–16)
ANISOCYTOSIS BLD QL: (no result)
BASOPHILS # BLD: 0.1 % (ref 0–2)
BUN SERPL-MCNC: 17 MG/DL (ref 7–18)
CALCIUM SERPL-MCNC: 8.5 MG/DL (ref 8.5–10.1)
CHLORIDE SERPL-SCNC: 105 MMOL/L (ref 98–107)
CO2 SERPL-SCNC: 30 MMOL/L (ref 21–32)
CREAT SERPL-MCNC: 0.5 MG/DL (ref 0.55–1.02)
DEPRECATED RDW RBC AUTO: 18.7 % (ref 11.6–15.6)
EOSINOPHIL # BLD: 0 % (ref 0–4.5)
GLUCOSE SERPL-MCNC: 104 MG/DL (ref 74–106)
HCT VFR BLD CALC: 32.4 % (ref 32.4–45.2)
HGB BLD-MCNC: 10.7 GM/DL (ref 10.7–15.3)
LYMPHOCYTES # BLD: 10.6 % (ref 8–40)
MACROCYTES BLD QL: (no result)
MCH RBC QN AUTO: 35.8 PG (ref 25.7–33.7)
MCHC RBC AUTO-ENTMCNC: 33.2 G/DL (ref 32–36)
MCV RBC: 107.9 FL (ref 80–96)
MONOCYTES # BLD AUTO: 14.1 % (ref 3.8–10.2)
NEUTROPHILS # BLD: 75.2 % (ref 42.8–82.8)
PLATELET # BLD AUTO: 201 K/MM3 (ref 134–434)
PLATELET BLD QL SMEAR: NORMAL
PMV BLD: 7.1 FL (ref 7.5–11.1)
POTASSIUM SERPLBLD-SCNC: 3.9 MMOL/L (ref 3.5–5.1)
RBC # BLD AUTO: 3 M/MM3 (ref 3.6–5.2)
SODIUM SERPL-SCNC: 141 MMOL/L (ref 136–145)
WBC # BLD AUTO: 11.9 K/MM3 (ref 4–10)

## 2018-05-22 RX ADMIN — ACETAMINOPHEN PRN MG: 325 TABLET ORAL at 15:58

## 2018-05-22 RX ADMIN — DEXAMETHASONE SCH: 4 TABLET ORAL at 12:54

## 2018-05-22 RX ADMIN — DEXAMETHASONE SCH MG: 4 TABLET ORAL at 22:47

## 2018-05-22 RX ADMIN — PANTOPRAZOLE SODIUM SCH MG: 40 TABLET, DELAYED RELEASE ORAL at 15:10

## 2018-05-22 RX ADMIN — NYSTATIN SCH: 100000 SUSPENSION ORAL at 23:21

## 2018-05-22 RX ADMIN — ACETAMINOPHEN PRN MG: 325 TABLET ORAL at 02:52

## 2018-05-22 RX ADMIN — MAGNESIUM OXIDE TAB 400 MG (241.3 MG ELEMENTAL MG) SCH: 400 (241.3 MG) TAB at 14:32

## 2018-05-22 RX ADMIN — ACETAMINOPHEN PRN MG: 325 TABLET ORAL at 11:38

## 2018-05-22 RX ADMIN — NYSTATIN SCH UNITS: 100000 SUSPENSION ORAL at 15:11

## 2018-05-22 RX ADMIN — DEXAMETHASONE SCH MG: 4 TABLET ORAL at 15:10

## 2018-05-22 RX ADMIN — MAGNESIUM OXIDE TAB 400 MG (241.3 MG ELEMENTAL MG) SCH MG: 400 (241.3 MG) TAB at 22:48

## 2018-05-22 RX ADMIN — HEPARIN SODIUM SCH: 5000 INJECTION, SOLUTION INTRAVENOUS; SUBCUTANEOUS at 15:11

## 2018-05-22 RX ADMIN — HEPARIN SODIUM SCH: 5000 INJECTION, SOLUTION INTRAVENOUS; SUBCUTANEOUS at 22:47

## 2018-05-22 RX ADMIN — NYSTATIN SCH: 100000 SUSPENSION ORAL at 17:33

## 2018-05-22 RX ADMIN — DEXAMETHASONE SCH: 4 TABLET ORAL at 17:33

## 2018-05-22 RX ADMIN — HEPARIN SODIUM SCH: 5000 INJECTION, SOLUTION INTRAVENOUS; SUBCUTANEOUS at 06:07

## 2018-05-22 NOTE — PN
Progress Note (short form)





- Note


Progress Note: 





NEUROSURGERY 








Minimal frontal H/A, no nausea, no stiff neck, blurriness OS


On steroid decadron 4mg q 6hrs


Tolerating diet well


PE: Tmax 98.1, AF, VSS


HEENT- NC/AT; Neck- supple; Cor- RRR; Lungs- CTA B;; Abd- benign; Ext- no sign 

of DVT


CN- intact except mild L CN VI palsy on lateral gaze; Motor- 4+-5 B UE/LE; 

Sensation- intact LT/vibration; DTR- hyporeflexic; Cerebellar- intact FTN B; 

Gait- stable


CSF G 51/P 214, 5 WBC, 5 RBC; gram stain negative


Cytology processing issues noted


Brain MRI with/without ashley(5-14): Mild atrophy, R superior cerebellar broad/

curvilinear folia and arachnoid enhancement visible on axial, coronal, and 

sagittal images; anterior cranial cervical junction enhancement;  no acute 

ischemia noted


Stage IV triple negative breast CA 


R/o leptomeningeal disease, for repeat LP today


Care and plans d/w Dr. Heather [facility] if CSF cytology positive for potential IT MTX tx

## 2018-05-22 NOTE — PN
Progress Note (short form)





- Note


Progress Note: 





Patient  seen and examined 


For repeat LP today


Complains of headache , dizziness, blurry vision. 


Complains of pain in head and somewhat unsteady of gait 


 Last Vital Signs











Temp Pulse Resp BP Pulse Ox


 


 97.6 F   71   18   135/68   98 


 


 05/22/18 05:56  05/22/18 05:56  05/22/18 05:56  05/22/18 05:56  05/21/18 21:00








HEENT: JOSEPH, EOM Intact


Oropharynx:  thrush, No mucositis


Neck: Supple


Nodes: Without adenopathy


Breasts: extensive erythema, nodularity chest wall right breast


Cor: RSR, No murmurs, No gallops


Lungs: Clear to P&A


Abd: Soft, Normal bowel sounds, No organomegaly


Ext:No significant edema


Skin: No rashes, Integument intact


 CBC, BMP





 05/22/18 06:00 





 05/22/18 06:00 





 Current Medications











Generic Name Dose Route Start Last Admin





  Trade Name Freq  PRN Reason Stop Dose Admin


 


Acetaminophen  650 mg  05/17/18 16:13  05/22/18 02:52





  Tylenol -  PO   650 mg





  Q4H PRN   Administration





  HEADACHE   


 


Acetaminophen  325 mg  05/17/18 16:18  05/18/18 08:06





  Tylenol -  PO   325 mg





  Q8H PRN   Administration





  PAIN 1-3   


 


Dexamethasone  4 mg  05/17/18 16:15  05/21/18 22:37





  Decadron -  PO   4 mg





  QID ANTONIO   Administration


 


Heparin Sodium (Porcine)  5,000 unit  05/19/18 14:00  05/22/18 06:07





  Heparin -  SQ   Not Given





  TID ANTONIO   


 


Magnesium Oxide  400 mg  05/20/18 22:00  05/21/18 22:37





  Mag-Ox -  PO   400 mg





  BID ANTONIO   Administration


 


Oxycodone HCl  5 mg  05/20/18 16:11  05/21/18 10:58





  Roxicodone -  PO   5 mg





  Q4H PRN   Administration





  PAIN 4-6   


 


Oxycodone HCl  10 mg  05/21/18 13:04  05/22/18 07:01





  Roxicodone -  PO   10 mg





  Q4H PRN   Administration





  PAIN LEVEL 6-10   


 


Pantoprazole Sodium  40 mg  05/18/18 18:45  05/21/18 09:11





  Protonix -  PO   40 mg





  DAILY ANTONIO   Administration


 


Propranolol HCl  40 mg  05/18/18 10:00  05/21/18 09:12





  Inderal -  PO   40 mg





  DAILY ANTONIO   Administration








Impression:


  


Metastatic breast cancer


Suspicion  for carcinomatous meningitis


Thrush


Pain





Plan: 


nystatin


Fentanyl--12mcg

## 2018-05-22 NOTE — EKG
Test Reason : 

Blood Pressure : ***/*** mmHG

Vent. Rate : 071 BPM     Atrial Rate : 071 BPM

   P-R Int : 166 ms          QRS Dur : 084 ms

    QT Int : 396 ms       P-R-T Axes : 061 011 031 degrees

   QTc Int : 430 ms

 

NORMAL SINUS RHYTHM

POSSIBLE LEFT ATRIAL ENLARGEMENT

LEFT VENTRICULAR HYPERTROPHY

ABNORMAL ECG

WHEN COMPARED WITH ECG OF 17-MAY-2018 12:35,

NO SIGNIFICANT CHANGE WAS FOUND

Confirmed by MD Vic, Jerrell ([id number]) on 5/22/2018 4:08:56 PM

 

Referred By: TATO DUNLAP           Confirmed By:Jerrell Ho MD

## 2018-05-22 NOTE — PN
Progress Note, Physician


Chief Complaint: 


headache and diplopia








History of Present Illness: 


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 

(s/p genzar and carboplatin) diagnosed 9/1/2017 now on taxol, mammary carcinoma 

of lobular phenotype and LCIS with lymphovascular invasion (currently on 

chemotherapy and sees Dr. Romero), IBD, GERD, RLE thrombophlebitis, and 

osteoasthritis presented to Dr. Romero office for x1 week of headache and nausea

, vertigo and diarrhea/indigestion. Pt would have recieved her 5th dose couple 

days ago, she started about 1 month ago. 


mild left sided HA -- ? double vision though not an issue now. Sees optho- ? 

cataract L eye. 


no focal weakness, numbness, slurred speech. 


LP Cytology never sent.  LP IR not tolerated as patient wasn't able to lie flat 

for it.  Spoke with Dr. Stanford who will perform LP again tomorrow.  











- Current Medication List


Current Medications: 


Active Medications





Acetaminophen (Tylenol -)  650 mg PO Q4H PRN


   PRN Reason: HEADACHE


   Last Admin: 05/22/18 11:38 Dose:  650 mg


Acetaminophen (Tylenol -)  325 mg PO Q8H PRN


   PRN Reason: PAIN 1-3


   Last Admin: 05/18/18 08:06 Dose:  325 mg


Dexamethasone (Decadron -)  4 mg PO QID [facility]


   Last Admin: 05/22/18 15:10 Dose:  4 mg


Fentanyl (Duragesic 12mcg Patch -)  1 patch TD Q72H [facility]


   Stop: 05/29/18 09:14


   Last Admin: 05/22/18 15:11 Dose:  1 patch


Heparin Sodium (Porcine) (Heparin -)  5,000 unit SQ TID [facility]


   Last Admin: 05/22/18 15:11 Dose:  Not Given


Magnesium Oxide (Mag-Ox -)  400 mg PO BID [facility]


   Last Admin: 05/22/18 14:32 Dose:  Not Given


Miscellaneous (Duragesic Patch Waste)  1 each TD PRN PRN


   PRN Reason: PAIN


Nystatin (Nystatin Oral Suspension -)  500,000 units PO Q6HPO [facility]


   Last Admin: 05/22/18 15:11 Dose:  500,000 units


Oxycodone HCl (Roxicodone -)  5 mg PO Q4H PRN


   PRN Reason: PAIN 4-6


   Last Admin: 05/21/18 10:58 Dose:  5 mg


Oxycodone HCl (Roxicodone -)  10 mg PO Q4H PRN


   PRN Reason: PAIN LEVEL 6-10


   Last Admin: 05/22/18 11:40 Dose:  10 mg


Pantoprazole Sodium (Protonix -)  40 mg PO DAILY [facility]


   Last Admin: 05/22/18 15:10 Dose:  40 mg


Propranolol HCl (Inderal -)  40 mg PO DAILY [facility]


   Last Admin: 05/22/18 15:11 Dose:  40 mg











- Objective


Vital Signs: 


 Vital Signs











Temperature  98.2 F   05/22/18 09:00


 


Pulse Rate  73   05/22/18 09:00


 


Respiratory Rate  18   05/22/18 09:00


 


Blood Pressure  149/86   05/22/18 09:00


 


O2 Sat by Pulse Oximetry (%)  99   05/22/18 09:00











Labs: 


 CBC, BMP





 05/22/18 06:00 





 05/22/18 06:00 





 INR, PTT











INR  1.10  (0.82-1.09)   05/17/18  14:15    














Problem List





- Problems


(1) Breast cancer metastasized to bone


Code(s): C50.919 - MALIGNANT NEOPLASM OF UNSP SITE OF UNSPECIFIED FEMALE BREAST

; C79.51 - SECONDARY MALIGNANT NEOPLASM OF BONE   


Qualifiers: 


   Laterality: right   Qualified Code(s): C50.911 - Malignant neoplasm of 

unspecified site of right female breast; C79.51 - Secondary malignant neoplasm 

of bone; C79.51 - Secondary malignant neoplasm of bone; C79.51 - Secondary 

malignant neoplasm of bone; C79.51 - Secondary malignant neoplasm of bone   





(2) Headache


Code(s): R51 - HEADACHE   


Qualifiers: 


   Headache type: unspecified   Headache chronicity pattern: unspecified 

pattern   Intractability: intractable   Qualified Code(s): R51 - Headache   





(3) Leptomeningeal metastases


Code(s): C79.49 - SECONDARY MALIGNANT NEOPLASM OF OTH PARTS OF NERVOUS SYSTEM   





Assessment/Plan


CSF cytology apparently never sent but elevated protein is highly suspicious.  

Dr. Stanford to perform LP again tomorrow.

## 2018-05-22 NOTE — PATH
Cytology Non-Gynecological Report



Patient Name:  ELINOR ROWLAND

Accession #:  

Med. Rec. #:  [id number]                                                        

   /Age/Gender:  1950 (Age: 67) / F

Account:  [id number]                                                          

             Location: [facility] MED/SURG

Taken:  2018

Received:  2018

Reported:  2018

Physicians:  Edgar Romero M.D.

  



Specimen(s) Received

 CEREBROSPINAL FLUID 





Clinical History

Suspicious for leptomeningeal disease, history of breast cancer







Final Diagnosis

CEREBROSPINAL FLUID FOR CYTOLOGY:

SATISFACTORY FOR EVALUATION.

ATYPICAL.

VERY RARE ATYPICAL CELLS AND RARE LYMPHOCYTES PRESENT.

SEE COMMENT.



Comment: The rare atypical cells cannot be further classified in this material.

Immunohistochemical stain for cytokeratin AE1/3 was attempted, but was

non-contributory due to lack of lesional cells in the cell block material.

History of breast cancer noted. Suggest clinical correlation. Case seen

interdepartmentally. Findings discussed with Dr. Romero.





***Electronically Signed***

Dorene Kenney M.D.





Gross Description

Approximately 2 cc of clear fluid received fresh.  Two cytofunnels and one

cellblock prepared

## 2018-05-22 NOTE — PN
Progress Note (short form)





- Note


Progress Note: 





Subjective: The patient was seen at the bedside, she has no complaints at this 

time. 


Lumbar puncture today





 Current Medications











Generic Name Dose Route Start Last Admin





  Trade Name Freq  PRN Reason Stop Dose Admin


 


Acetaminophen  650 mg  05/17/18 16:13  05/22/18 02:52





  Tylenol -  PO   650 mg





  Q4H PRN   Administration





  HEADACHE   


 


Acetaminophen  325 mg  05/17/18 16:18  05/18/18 08:06





  Tylenol -  PO   325 mg





  Q8H PRN   Administration





  PAIN 1-3   


 


Dexamethasone  4 mg  05/17/18 16:15  05/21/18 22:37





  Decadron -  PO   4 mg





  QID ANTONIO   Administration


 


Fentanyl  1 patch  05/22/18 09:15  





  Duragesic 12mcg Patch -  TD  05/29/18 09:14  





  Q72H [facility]   


 


Heparin Sodium (Porcine)  5,000 unit  05/19/18 14:00  05/22/18 06:07





  Heparin -  SQ   Not Given





  TID ANTONIO   


 


Magnesium Oxide  400 mg  05/20/18 22:00  05/21/18 22:37





  Mag-Ox -  PO   400 mg





  BID ANTNOIO   Administration


 


Miscellaneous  1 each  05/22/18 08:41  





  Duragesic Patch Waste  TD   





  PRN PRN   





  PAIN   


 


Nystatin  500,000 units  05/22/18 12:00  





  Nystatin Oral Suspension -  PO   





  Q6HPO ANTONIO   


 


Oxycodone HCl  5 mg  05/20/18 16:11  05/21/18 10:58





  Roxicodone -  PO   5 mg





  Q4H PRN   Administration





  PAIN 4-6   


 


Oxycodone HCl  10 mg  05/21/18 13:04  05/22/18 07:01





  Roxicodone -  PO   10 mg





  Q4H PRN   Administration





  PAIN LEVEL 6-10   


 


Pantoprazole Sodium  40 mg  05/18/18 18:45  05/21/18 09:11





  Protonix -  PO   40 mg





  DAILY ANTONIO   Administration


 


Propranolol HCl  40 mg  05/18/18 10:00  05/21/18 09:12





  Inderal -  PO   40 mg





  DAILY ANTONIO   Administration








Objective: 


 


 Vital Signs











 Period  Temp  Pulse  Resp  BP Sys/Zhao  Pulse Ox


 


 Last 24 Hr  97.6 F-98.3 F  70-73  18-20  127-155/64-80  98








Physical Exam: 


General: NAD, A&Ox3


Lungs: CTA bilaterally


Heart: RRR, S1S2


Ext: Warm, well-perfused. 2+ DP/PT bilaterally. Ambulates with cane





 CBCD











WBC  11.9 K/mm3 (4.0-10.0)  H  05/22/18  06:00    


 


RBC  3.00 M/mm3 (3.60-5.2)  L  05/22/18  06:00    


 


Hgb  10.7 GM/dL (10.7-15.3)   05/22/18  06:00    


 


Hct  32.4 % (32.4-45.2)   05/22/18  06:00    


 


MCV  107.9 fl (80-96)  H  05/22/18  06:00    


 


MCHC  33.2 g/dl (32.0-36.0)   05/22/18  06:00    


 


RDW  18.7 % (11.6-15.6)  H  05/22/18  06:00    


 


Plt Count  201 K/MM3 (134-434)   05/22/18  06:00    


 


MPV  7.1 fl (7.5-11.1)  L  05/22/18  06:00    








 CMP











Sodium  141 mmol/L (136-145)   05/22/18  06:00    


 


Potassium  3.9 mmol/L (3.5-5.1)   05/22/18  06:00    


 


Chloride  105 mmol/L ()   05/22/18  06:00    


 


Carbon Dioxide  30 mmol/L (21-32)   05/22/18  06:00    


 


Anion Gap  6  (8-16)  L  05/22/18  06:00    


 


BUN  17 mg/dL (7-18)   05/22/18  06:00    


 


Creatinine  0.5 mg/dL (0.55-1.02)  L  05/22/18  06:00    


 


Creat Clearance w eGFR  > 60  (>60)   05/21/18  06:00    


 


Random Glucose  104 mg/dL ()   05/22/18  06:00    


 


Calcium  8.5 mg/dL (8.5-10.1)   05/22/18  06:00    


 


Total Bilirubin  0.2 mg/dL (0.2-1.0)   05/21/18  06:00    


 


AST  21 U/L (15-37)   05/21/18  06:00    


 


ALT  47 U/L (12-78)   05/21/18  06:00    


 


Alkaline Phosphatase  97 U/L ()   05/21/18  06:00    


 


Total Protein  6.2 g/dl (6.4-8.2)  L  05/21/18  06:00    


 


Albumin  3.4 g/dl (3.4-5.0)   05/21/18  06:00    








 Microbiology





05/18/18 08:30   Cerebral Spinal Fluid - Lumbar Puncture   Gram Stain - Final





Imaging: 


- MRI brain (5/14/18): There is faint focal linear-like increased T2 signal 

intensity in superior aspect of the right cerebellum that appears to be 

enhancing on the post contrast examination. There is also leptomeningeal 

enhancement within the superior cerebellar folia bilaterally. Leptomeningeal 

enchancement along posterior margin of the clivus as well as dural enhancement 

in the included portion of the upper cervical spine





Assessment: This is a 67 year old female with PMHx of osteoarthritis, GERD, 

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 

presented to the ED with headache, nausea, vertigo, and diarrhea/indigestion x1 

week. 





Plan: 


1) Metastatic right breast cancer


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


- LP today, send cytology, cell count, protein, glucose, culture


- Possible Ommaya and IT therapy, to be further discussed with oncology


- Continue Decadron


- Pain management


- Appreciate oncology consult





2) F/E/N:


- Regular diet


- Monitor electrolytes





3) Prophylaxis: 


- OOB ambulating


- Heparin 5,000u sq tid 





4) Dispo:


- Requires continued inpatient care





CODE STATUS: FULL CODE





Visit type





- Emergency Visit


Emergency Visit: Yes


ED Registration Date: 05/17/18


Care time: The patient presented to the Emergency Department on the above date 

and was hospitalized for further evaluation of their emergent condition.





- New Patient


This patient is new to me today: No





- Critical Care


Critical Care patient: No

## 2018-05-23 LAB
ALBUMIN SERPL-MCNC: 3.4 G/DL (ref 3.4–5)
ALP SERPL-CCNC: 86 U/L (ref 45–117)
ALT SERPL-CCNC: 35 U/L (ref 12–78)
ANION GAP SERPL CALC-SCNC: 6 MMOL/L (ref 8–16)
ANISOCYTOSIS BLD QL: (no result)
APPEARANCE UR: CLEAR
APTT BLD: 45.6 SECONDS (ref 26.9–34.4)
AST SERPL-CCNC: 19 U/L (ref 15–37)
BASOPHILS # BLD: 0.1 % (ref 0–2)
BILIRUB SERPL-MCNC: 0.5 MG/DL (ref 0.2–1)
BILIRUB UR STRIP.AUTO-MCNC: NEGATIVE MG/DL
BUN SERPL-MCNC: 11 MG/DL (ref 7–18)
CALCIUM SERPL-MCNC: 8.1 MG/DL (ref 8.5–10.1)
CHLORIDE SERPL-SCNC: 95 MMOL/L (ref 98–107)
CO2 SERPL-SCNC: 31 MMOL/L (ref 21–32)
COLOR UR: (no result)
CREAT SERPL-MCNC: 0.4 MG/DL (ref 0.55–1.02)
DACRYOCYTES BLD QL SMEAR: (no result)
DEPRECATED RDW RBC AUTO: 18.6 % (ref 11.6–15.6)
EOSINOPHIL # BLD: 0 % (ref 0–4.5)
GLUCOSE SERPL-MCNC: 126 MG/DL (ref 74–106)
HCT VFR BLD CALC: 34.7 % (ref 32.4–45.2)
HGB BLD-MCNC: 11.6 GM/DL (ref 10.7–15.3)
INR BLD: 1.07 (ref 0.82–1.09)
KETONES UR QL STRIP: (no result)
LEUKOCYTE ESTERASE UR QL STRIP.AUTO: NEGATIVE
LYMPHOCYTES # BLD: 7.7 % (ref 8–40)
MACROCYTES BLD QL: (no result)
MCH RBC QN AUTO: 35.5 PG (ref 25.7–33.7)
MCHC RBC AUTO-ENTMCNC: 33.5 G/DL (ref 32–36)
MCV RBC: 106.1 FL (ref 80–96)
MONOCYTES # BLD AUTO: 13.1 % (ref 3.8–10.2)
NEUTROPHILS # BLD: 79.1 % (ref 42.8–82.8)
NITRITE UR QL STRIP: NEGATIVE
PH UR: 7 [PH] (ref 5–8)
PLATELET # BLD AUTO: 241 K/MM3 (ref 134–434)
PLATELET BLD QL SMEAR: NORMAL
PMV BLD: 7.1 FL (ref 7.5–11.1)
POTASSIUM SERPLBLD-SCNC: 3.9 MMOL/L (ref 3.5–5.1)
PROT SERPL-MCNC: 6.8 G/DL (ref 6.4–8.2)
PROT UR QL STRIP: NEGATIVE
PROT UR QL STRIP: NEGATIVE
PT PNL PPP: 12.1 SEC (ref 9.7–13)
RBC # BLD AUTO: 3.27 M/MM3 (ref 3.6–5.2)
SODIUM SERPL-SCNC: 132 MMOL/L (ref 136–145)
SP GR UR: 1.02 (ref 1–1.03)
UROBILINOGEN UR STRIP-MCNC: NEGATIVE MG/DL (ref 0.2–1)
WBC # BLD AUTO: 12.2 K/MM3 (ref 4–10)

## 2018-05-23 RX ADMIN — NYSTATIN SCH UNITS: 100000 SUSPENSION ORAL at 17:36

## 2018-05-23 RX ADMIN — DEXAMETHASONE SODIUM PHOSPHATE SCH MG: 4 INJECTION, SOLUTION INTRAMUSCULAR; INTRAVENOUS at 11:48

## 2018-05-23 RX ADMIN — ONDANSETRON PRN MG: 2 INJECTION INTRAMUSCULAR; INTRAVENOUS at 20:10

## 2018-05-23 RX ADMIN — DEXAMETHASONE SODIUM PHOSPHATE SCH MG: 4 INJECTION, SOLUTION INTRAMUSCULAR; INTRAVENOUS at 17:36

## 2018-05-23 RX ADMIN — ACETAMINOPHEN PRN MG: 10 INJECTION, SOLUTION INTRAVENOUS at 23:49

## 2018-05-23 RX ADMIN — NYSTATIN SCH UNITS: 100000 SUSPENSION ORAL at 11:26

## 2018-05-23 RX ADMIN — HEPARIN SODIUM SCH: 5000 INJECTION, SOLUTION INTRAVENOUS; SUBCUTANEOUS at 06:37

## 2018-05-23 RX ADMIN — CHLORHEXIDINE GLUCONATE SCH APPLIC: 213 SOLUTION TOPICAL at 23:48

## 2018-05-23 RX ADMIN — MUPIROCIN SCH APPLIC: 20 OINTMENT TOPICAL at 10:00

## 2018-05-23 RX ADMIN — MUPIROCIN SCH APPLIC: 20 OINTMENT TOPICAL at 23:48

## 2018-05-23 RX ADMIN — MAGNESIUM OXIDE TAB 400 MG (241.3 MG ELEMENTAL MG) SCH MG: 400 (241.3 MG) TAB at 23:48

## 2018-05-23 RX ADMIN — NYSTATIN SCH: 100000 SUSPENSION ORAL at 06:37

## 2018-05-23 NOTE — PN
Teaching Attending Note


Name of Resident: Melida Shannon





ATTENDING PHYSICIAN STATEMENT





I saw and evaluated the patient.


I reviewed the resident's note and discussed the case with the resident.


I agree with the resident's findings and plan as documented.








SUBJECTIVE:





Pt seen and examined in the ICU. Briefly, 68yo female with h/o metastatic 

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 

nausea, headache and blurry vision. Transferred to the ICU for episode of 

bradycardia, hypertension and increasing headache. CT head without acute 

findings. Pt scheduled for Ommaya placement for intrathecal chemotherapy.





OBJECTIVE:





 Last Vital Signs











Temp Pulse Resp BP Pulse Ox


 


 97.5 F L  56 L  18   189/77   93 L


 


 05/23/18 08:30  05/23/18 08:40  05/23/18 08:40  05/23/18 08:40  05/23/18 09:00








 Intake & Output











 05/20/18 05/21/18 05/22/18 05/23/18





 23:59 23:59 23:59 23:59


 


Intake Total 1350 400 520 


 


Balance 1350 400 520 


 


Weight 92.896 kg 93.803 kg  








Gen:  uncomfortable


Heart: bradycardic, regular


Lung: decreased breath sounds at the baes


Abd: soft, nontender


Ext: no edema





 CBC, BMP





 05/23/18 07:15 





 05/23/18 11:55 





Active Medications





Acetaminophen (Tylenol -)  650 mg PO Q4H PRN


   PRN Reason: HEADACHE


Acetaminophen (Tylenol -)  325 mg PO Q8H PRN


   PRN Reason: PAIN 1-3


Chlorhexidine Gluconate (Hibiclens For Decolonization -)  1 applic TP HS [facility]


Dexamethasone Sodium Phosphate (Decadron Injection -)  4 mg IVPUSH Q6H [facility]


   Last Admin: 05/23/18 11:48 Dose:  4 mg


Fentanyl (Duragesic 12mcg Patch -)  1 patch TD Q72H [facility]


   Stop: 05/29/18 09:14


Magnesium Oxide (Mag-Ox -)  400 mg PO BID [facility]


Miscellaneous (Duragesic Patch Waste)  1 each TD PRN PRN


   PRN Reason: PAIN


Mupirocin (Bactroban Ointment (For Decolonization) -)  1 applic NS BID [facility]


   Stop: 05/28/18 09:59


Nystatin (Nystatin Oral Suspension -)  500,000 units PO Q6HPO [facility]


   Last Admin: 05/23/18 11:26 Dose:  500,000 units


Ondansetron HCl (Zofran Injection)  4 mg IVPB Q8H PRN


   PRN Reason: NAUSEA


Oxycodone HCl (Roxicodone -)  5 mg PO Q4H PRN


   PRN Reason: PAIN 4-6


Oxycodone HCl (Roxicodone -)  10 mg PO Q4H PRN


   PRN Reason: PAIN LEVEL 6-10


   Last Admin: 05/23/18 14:22 Dose:  10 mg


Pantoprazole Sodium (Protonix -)  40 mg PO DAILY [facility]


Prochlorperazine Edisylate (Compazine Injection -)  10 mg IVPB Q4H PRN


   PRN Reason: NAUSEA AND/OR VOMITING


Prochlorperazine Maleate (Compazine -)  10 mg PO Q4H PRN


   PRN Reason: NAUSEA AND/OR VOMITING








ASSESSMENT AND PLAN:


Hypertensive Urgency


Metastatic Breast Ca 


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





-  BP control, can use hydralazine as pt bradycardic


-  for Ommaya placement for intrathecal chemo


-  continue decadron


-  antiemetics


-  pain control


-  DVT prophylaxis

## 2018-05-23 NOTE — CON.CARD
Cardiology Consult (text)





- Consultation


Consultation Note: 





Cardiology Consult Dictated





IMP:


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


Several weeks of headaches, now acutely worsened this morning, with new 

bradycardia and worsened HTN raising concern for acute ICH











REC:


1. Stat head CT


2. ICU monitoring


3. Repeat ECG in 30 minutes, repeat labs, cardiac enzymes


4. Echo


5. Neuro and neurosurgery following








D/W Primary Care Team

## 2018-05-23 NOTE — PN
Progress Note (short form)





- Note


Progress Note: 


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 

(s/p genzar and carboplatin) diagnosed 2017 now on taxol, mammary carcinoma 

of lobular phenotype and LCIS with lymphovascular invasion (currently on 

chemotherapy and sees Dr. Romero), IBD, GERD, RLE thrombophlebitis, and 

osteoasthritis presented to Dr. Romero office for x1 week of headache and nausea

, vertigo and diarrhea/indigestion. Pt would have recieved her 5th dose today, 

she started about 1 month ago. 


mild left sided HA -- ? double vision though not an issue now. Sees optho- ? 

cataract L eye. 


no focal weakness, numbness, slurred speech. 





MRI reviewed. 


MRI/BRAIN MRI W W/O CONTRAST : Upon further review of the images, there is 

faint focal linear-like increased T2 signal intensity in superior aspect of the 

right cerebellum that appears to be enhancing on the postcontrast examination 

measuring approximately 11 mm in anterior-posterior length and 7 mm in width 

suggestive of focal cortical enhancement. There is also leptomeningeal 

enhancement within the superior cerebellar folia, bilaterally. There is 

leptomeningeal enhancement along posterior margin of the clivus as well as 

dural enhancement in the included portion of the upper cervical spine, mainly 

anteriorly up to upper C3 level. 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 

carcinomatosis, considering the clinical history of breast cancer with focal 

cortical involvement in the right cerebellum, superiorly. Please correlate with 

CSF fluid analysis and close follow-up MRI is recommended. Case discussed with 

Dr. Gabriel Verde and Dr. Edgar Romero, caring attending physician





FU : 





LP was planned for this AM, she is nauseous and deferred procedure to be done


eyes closed and she feels "sick "; BP was high 








- Past Medical History


Gastrointestinal: Yes: GERD, Irritable Bowel Disease


Musculoskeletal: Yes: Osteoarthritis


Additional Medical History: H/O thrombophlebitis right LE





- Alcohol/Substance Use


Hx Alcohol Use: No


History of Substance Use: reports: None





- Smoking History


Smoking history: Never smoked


Have you smoked in the past 12 months: No





- Social History


ADL: Independent


Occupation: [employer] nurse 


History of Recent Travel: No





Home Medications





- Allergies


Allergies/Adverse Reactions: 


 Allergies











Allergy/AdvReac Type Severity Reaction Status Date / Time


 


adhesive tape Allergy  Rash Verified 18 10:23


 


No Known Drug Allergies Allergy   Verified 18 10:23














- Home Medications


Home Medications: 


Ambulatory Orders





Multivitamins [Tab-A-Vit -] 1 tab PO DAILY 17 


Ranitidine [Zantac -] 150 mg PO BID 17 


Vitamin C 500 mg PO DAILY 17 


Vitamin D - 1,000 unit PO DAILY 17 


Oxycodone HCl/Acetaminophen [Endocet 5-325 Tablet] 1 each PO Q8H PRN MDD 3  


Dexamethasone [Decadron] 4 mg PO TID 18 


Inderal - 40 mg PO DAILY 18 


Magnesium Oxide 500 mg PO BID 18 











Family Disease History





- Family Disease History


Family Disease History: CA: Grandparent (mat GM breast ca 50  57), 

Father (panceratic ca 82), Mother (CRC 46  57), Sister (mutiple meyloma)





Physical Exam-Neuro


Vital Signs: 


 


  Vital Signs











Temperature  97.6 F   18 22:50


 


Pulse Rate  65   18 22:50


 


Respiratory Rate  18   18 22:50


 


Blood Pressure  160/98   18 22:50


 


O2 Sat by Pulse Oximetry (%)  98   18 21:00














Labs: 


 INR, PTT











INR  1.10  (0.82-1.09)   18  14:15    














- Neuro Exam


Level Of Consciousness: Yes: Alert, Oriented to Person (EOMI, min esotropia L 

eye, slight aniscoria L >R --constrict, no facial, motor 5/5, reflexes 

symmetric )





Imaging





- Results


MRI: Report Reviewed, Image Reviewed





Problem List





- Problems


(1) Breast cancer metastasized to bone


Code(s): C50.919 - MALIGNANT NEOPLASM OF UNSP SITE OF UNSPECIFIED FEMALE BREAST

; C79.51 - SECONDARY MALIGNANT NEOPLASM OF BONE   


Qualifiers: 


   Laterality: right   Qualified Code(s): C50.911 - Malignant neoplasm of 

unspecified site of right female breast; C79.51 - Secondary malignant neoplasm 

of bone; C79.51 - Secondary malignant neoplasm of bone; C79.51 - Secondary 

malignant neoplasm of bone; C79.51 - Secondary malignant neoplasm of bone   





(2) Headache


Code(s): R51 - HEADACHE   


Qualifiers: 


   Headache type: unspecified   Headache chronicity pattern: unspecified 

pattern   Intractability: intractable   Qualified Code(s): R51 - Headache   





(3) Leptomeningeal metastases


Code(s): C79.49 - SECONDARY MALIGNANT NEOPLASM OF OTH PARTS OF NERVOUS SYSTEM   





(4) Nausea


Code(s): R11.0 - NAUSEA   





Assessment/Plan


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 

(s/p genzar and carboplatin) diagnosed 2017 now on taxol, mammary carcinoma 

of lobular phenotype and LCIS with lymphovascular invasion , HX of focal RT , (

currently on chemotherapy and sees Dr. Romero), IBD, GERD, RLE thrombophlebitis, 

and osteoasthritis presented to Dr. Romero office for x1-2 week of headache and 

nausea, vertigo and diarrhea/indigestion.


MRI shows ? enhancement R cerebellar area/ possible leptomeningeal disease --


spinal tap done to confirm 





on decadron 





LP rescheduled for AM given that she is deferring now bc of nausea


nursing team aware





DR Stanford 














Problem List





- Problems


(1) Breast cancer metastasized to bone


Code(s): C50.919 - MALIGNANT NEOPLASM OF UNSP SITE OF UNSPECIFIED FEMALE BREAST

; C79.51 - SECONDARY MALIGNANT NEOPLASM OF BONE   


Qualifiers: 


   Laterality: right   Qualified Code(s): C50.911 - Malignant neoplasm of 

unspecified site of right female breast; C79.51 - Secondary malignant neoplasm 

of bone; C79.51 - Secondary malignant neoplasm of bone; C79.51 - Secondary 

malignant neoplasm of bone; C79.51 - Secondary malignant neoplasm of bone   





(2) Headache


Code(s): R51 - HEADACHE   


Qualifiers: 


   Headache type: unspecified   Headache chronicity pattern: unspecified 

pattern   Intractability: intractable   Qualified Code(s): R51 - Headache   





(3) Leptomeningeal metastases


Code(s): C79.49 - SECONDARY MALIGNANT NEOPLASM OF OTH PARTS OF NERVOUS SYSTEM   





(4) Nausea


Code(s): R11.0 - NAUSEA

## 2018-05-23 NOTE — EKG
Test Reason : 

Blood Pressure : ***/*** mmHG

Vent. Rate : 052 BPM     Atrial Rate : 052 BPM

   P-R Int : 166 ms          QRS Dur : 086 ms

    QT Int : 460 ms       P-R-T Axes : 059 015 053 degrees

   QTc Int : 427 ms

 

SINUS BRADYCARDIA WITH SINUS ARRHYTHMIA

MINIMAL VOLTAGE CRITERIA FOR LVH, MAY BE NORMAL VARIANT

BORDERLINE ECG

WHEN COMPARED WITH ECG OF 23-MAY-2018 09:21,

NO SIGNIFICANT CHANGE WAS FOUND

Confirmed by ROMARIO TORRES, RIO ([id number]) on 5/23/2018 4:34:20 PM

 

Referred By: TATO DUNLAP           Confirmed By:RIO DOSS MD

## 2018-05-23 NOTE — PN
Progress Note (short form)





- Note


Progress Note: 





 


Pt seen and examined. 


 at bedside. 





Events noted





continues with high BP/nausea/fatigue. 





O/E:





HEENT: closing eyes


Oropharynx:  thrush, No mucositis


Neck: Supple


Nodes: Without adenopathy


Breasts: extensive erythema, nodularity chest wall right breast


Cor: RSR, No murmurs, No gallops


Lungs: Clear to P&A


Abd: Soft, Normal bowel sounds, No organomegaly


Ext:No significant edema


Skin: No rashes, Integument intact














Temp Pulse Resp BP Pulse Ox


 


 97.5 F L  56 L  18   189/77   93 L


 


 05/23/18 08:30  05/23/18 08:40  05/23/18 08:40  05/23/18 08:40  05/23/18 09:00








 CBC, BMP





 05/23/18 07:15 





 05/22/18 06:00 





 Current Medications











Generic Name Dose Route Start Last Admin





  Trade Name Freq  PRN Reason Stop Dose Admin


 


Acetaminophen  650 mg  05/23/18 10:56  





  Tylenol -  PO   





  Q4H PRN   





  HEADACHE   





     





     





     


 


Acetaminophen  325 mg  05/23/18 10:56  





  Tylenol -  PO   





  Q8H PRN   





  PAIN 1-3   





     





     





     


 


Chlorhexidine Gluconate  1 applic  05/23/18 22:00  





  Hibiclens For Decolonization -  TP   





  HS ANTONIO   





     





     





     





     


 


Dexamethasone  4 mg  05/23/18 14:00  





  Decadron -  PO   





  QID ANTONIO   





     





     





     





     


 


Fentanyl  1 patch  05/25/18 09:15  





  Duragesic 12mcg Patch -  TD  05/29/18 09:14  





  Q72H ANTONIO   





     





     





     





     


 


Magnesium Oxide  400 mg  05/23/18 22:00  





  Mag-Ox -  PO   





  BID [facility]   





     





     





     





     


 


Miscellaneous  1 each  05/23/18 10:56  





  Duragesic Patch Waste  TD   





  PRN PRN   





  PAIN   





     





     





     


 


Mupirocin  1 applic  05/23/18 10:00  





  Bactroban Ointment (For Decolonization) -  NS  05/28/18 09:59  





  BID [facility]   





     





     





     





     


 


Nystatin  500,000 units  05/23/18 12:00  





  Nystatin Oral Suspension -  PO   





  Q6HPO [facility]   





     





     





     





     


 


Ondansetron HCl  4 mg  05/23/18 10:56  





  Zofran Injection  IVPB   





  Q8H PRN   





  NAUSEA   





     





     





     


 


Oxycodone HCl  5 mg  05/23/18 10:56  





  Roxicodone -  PO   





  Q4H PRN   





  PAIN 4-6   





     





     





     


 


Oxycodone HCl  10 mg  05/23/18 10:56  





  Roxicodone -  PO   





  Q4H PRN   





  PAIN LEVEL 6-10   





     





     





     


 


Pantoprazole Sodium  40 mg  05/24/18 10:00  





  Protonix -  PO   





  DAILY ANTONIO   





     





     





     





     


 


Prochlorperazine Edisylate  10 mg  05/23/18 10:56  





  Compazine Injection -  IVPB   





  Q4H PRN   





  NAUSEA AND/OR VOMITING   





     





     





     


 


Prochlorperazine Maleate  10 mg  05/23/18 10:56  





  Compazine -  PO   





  Q4H PRN   





  NAUSEA AND/OR VOMITING   





     





     





     








Pt present symptoms concerning for worsening ICP/LMD in the setting advanced 

TNBC. 





for Ommaya


then for IT MTX


to be considered for RT later point of time. 


Repeat PTT stat now. (earlier PTT slightly elevated ?Error)


 HTN control 


Dex present doses


CBC/coags for am.

## 2018-05-23 NOTE — EKG
Test Reason : 

Blood Pressure : ***/*** mmHG

Vent. Rate : 045 BPM     Atrial Rate : 045 BPM

   P-R Int : 168 ms          QRS Dur : 084 ms

    QT Int : 494 ms       P-R-T Axes : 059 015 048 degrees

   QTc Int : 427 ms

 

SINUS BRADYCARDIA

POSSIBLE LEFT ATRIAL ENLARGEMENT

LEFT VENTRICULAR HYPERTROPHY

ABNORMAL ECG

WHEN COMPARED WITH ECG OF 22-MAY-2018 15:01,

VENT. RATE HAS DECREASED BY  26 BPM

Confirmed by ROMARIO TORRES, RIO ([id number]) on 5/23/2018 2:28:29 PM

 

Referred By: TATO DUNLAP           Confirmed By:RIO DOSS MD

## 2018-05-23 NOTE — PN
Progress Note (short form)





- Note


Progress Note: 





NEUROSURGERY 








c/o frontal H/A, no nausea, no stiff neck, blurriness OS


Transferred to ICU


 at bedside


No appetite


PE: Tmax 98.1, , HR 40-50, VSS


HEENT- NC/AT; Neck- supple; Cor- RRR; Lungs- CTA B;; Abd- benign; Ext- no sign 

of DVT


CN- intact except mild L CN VI palsy on lateral gaze; Motor- 4+-5 B UE/LE; 

Sensation- intact LT/vibration; DTR- hyporeflexic; Cerebellar- intact FTN B; 

Gait- stable


INR 1.07, ptt 41.7


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


Brain MRI with/without ashley(5-14): Mild atrophy, R superior cerebellar broad/

curvilinear folia and arachnoid enhancement visible on axial, coronal, and 

sagittal images; anterior cranial cervical junction enhancement;  no acute 

ischemia noted


Stage IV triple negative breast CA 


Care d/w Dr Reddy, who felt there is good evidence based on prior CSF cytology 

and MRI findings to proceed with IT MTX treatment


Care and plans d/w  pt


Procedure in detail, risks (bleeding, infection, stroke, reservoir malfunction 

requiring revision, seizure, coma, death, GA), alternative, post-op care 

discussed


All questions answered


OR at 1100


If persistently elevated ptt consider FFP

## 2018-05-23 NOTE — PN
Progress Note (short form)





- Note


Progress Note: 





NEUROSURGERY 








c/o frontal H/A, no nausea, no stiff neck, blurriness OS


On steroid decadron 4mg q 6hrs


No appetite


PE: Tmax 98.1, AF, VSS


HEENT- NC/AT; Neck- supple; Cor- RRR; Lungs- CTA B;; Abd- benign; Ext- no sign 

of DVT


CN- intact except mild L CN VI palsy on lateral gaze; Motor- 4+-5 B UE/LE; 

Sensation- intact LT/vibration; DTR- hyporeflexic; Cerebellar- intact FTN B; 

Gait- stable


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


Brain MRI with/without ashley(5-14): Mild atrophy, R superior cerebellar broad/

curvilinear folia and arachnoid enhancement visible on axial, coronal, and 

sagittal images; anterior cranial cervical junction enhancement;  no acute 

ischemia noted


Stage IV triple negative breast CA 


To confirm leptomeningeal disease, for repeat LP today


Care and plans d/w Dr. Romero yesterday

## 2018-05-23 NOTE — PN
Progress Note (short form)





- Note


Progress Note: 





Subjective: The patient was seen at the bedside


Called by RN that patient's BP 180s/90s and HR 40s after laying her flat. Stat 

EKG ordered. 


Saw patient who states she is nauseous. When they laid her flat she began to 

experience headache, nausea. She denies any chest pain. 


EKG reviewed with Dr. Steen, peaked T-waves in V3-V6. Repeat EKG in 30 

min. Cardiac enzymes. CMP stat


Stat Head CT ordered, concern for increased ICP


Transfer to ICU


Per Dr. Reddy's discussion with Dr. Verde, will place intrathecal pump tomorrow 

and start intrathecal methotrexate





 Current Medications











Generic Name Dose Route Start Last Admin





  Trade Name Freq  PRN Reason Stop Dose Admin


 


Acetaminophen  650 mg  05/17/18 16:13  05/22/18 15:58





  Tylenol -  PO   650 mg





  Q4H PRN   Administration





  HEADACHE   





     





     





     


 


Acetaminophen  325 mg  05/17/18 16:18  05/18/18 08:06





  Tylenol -  PO   325 mg





  Q8H PRN   Administration





  PAIN 1-3   





     





     





     


 


Dexamethasone  4 mg  05/17/18 16:15  05/22/18 22:47





  Decadron -  PO   4 mg





  QID ANTONIO   Administration





     





     





     





     


 


Fentanyl  1 patch  05/22/18 09:15  05/22/18 15:11





  Duragesic 12mcg Patch -  TD  05/29/18 09:14  1 patch





  Q72H ANTONIO   Administration





     





     





     





     


 


Heparin Sodium (Porcine)  5,000 unit  05/19/18 14:00  05/23/18 06:37





  Heparin -  SQ   Not Given





  TID ANTONIO   





     





     





     





     


 


Magnesium Oxide  400 mg  05/20/18 22:00  05/22/18 22:48





  Mag-Ox -  PO   400 mg





  BID ANTONIO   Administration





     





     





     





     


 


Miscellaneous  1 each  05/22/18 08:41  





  Duragesic Patch Waste  TD   





  PRN PRN   





  PAIN   





     





     





     


 


Nystatin  500,000 units  05/22/18 12:00  05/23/18 06:37





  Nystatin Oral Suspension -  PO   Not Given





  Q6HPO ANTONIO   





     





     





     





     


 


Ondansetron HCl  4 mg  05/22/18 17:13  05/23/18 06:59





  Zofran Injection  IVPB   4 mg





  Q8H PRN   Administration





  NAUSEA   





     





     





     


 


Oxycodone HCl  5 mg  05/20/18 16:11  05/21/18 10:58





  Roxicodone -  PO   5 mg





  Q4H PRN   Administration





  PAIN 4-6   





     





     





     


 


Oxycodone HCl  10 mg  05/21/18 13:04  05/22/18 15:58





  Roxicodone -  PO   10 mg





  Q4H PRN   Administration





  PAIN LEVEL 6-10   





     





     





     


 


Pantoprazole Sodium  40 mg  05/18/18 18:45  05/22/18 15:10





  Protonix -  PO   40 mg





  DAILY ANTONIO   Administration





     





     





     





     


 


Prochlorperazine Edisylate  10 mg  05/22/18 17:13  05/23/18 09:31





  Compazine Injection -  IVPB   10 mg





  Q4H PRN   Administration





  NAUSEA AND/OR VOMITING   





     





     





     


 


Prochlorperazine Maleate  10 mg  05/22/18 17:13  





  Compazine -  PO   





  Q4H PRN   





  NAUSEA AND/OR VOMITING   





     





     





     


 


Propranolol HCl  40 mg  05/18/18 10:00  05/22/18 15:11





  Inderal -  PO   40 mg





  DAILY ANTONIO   Administration





     





     





     





     








Objective: 


  Vital Signs











 Period  Temp  Pulse  Resp  BP Sys/Zhao  Pulse Ox


 


 Last 24 Hr  97.6 F-98.2 F  65-67  18-18  160-165/85-98  98








Physical Exam: 


General: A&Ox3, eyes are closed, patient reports not feeling well


Heart: Bradycardia


 CBCD











WBC  12.2 K/mm3 (4.0-10.0)  H  05/23/18  07:15    


 


RBC  3.27 M/mm3 (3.60-5.2)  L  05/23/18  07:15    


 


Hgb  11.6 GM/dL (10.7-15.3)   05/23/18  07:15    


 


Hct  34.7 % (32.4-45.2)   05/23/18  07:15    


 


MCV  106.1 fl (80-96)  H  05/23/18  07:15    


 


MCHC  33.5 g/dl (32.0-36.0)   05/23/18  07:15    


 


RDW  18.6 % (11.6-15.6)  H  05/23/18  07:15    


 


Plt Count  241 K/MM3 (134-434)   05/23/18  07:15    


 


MPV  7.1 fl (7.5-11.1)  L  05/23/18  07:15    








 CMP











Sodium  141 mmol/L (136-145)   05/22/18  06:00    


 


Potassium  3.9 mmol/L (3.5-5.1)   05/22/18  06:00    


 


Chloride  105 mmol/L ()   05/22/18  06:00    


 


Carbon Dioxide  30 mmol/L (21-32)   05/22/18  06:00    


 


Anion Gap  6  (8-16)  L  05/22/18  06:00    


 


BUN  17 mg/dL (7-18)   05/22/18  06:00    


 


Creatinine  0.5 mg/dL (0.55-1.02)  L  05/22/18  06:00    


 


Creat Clearance w eGFR  > 60  (>60)   05/21/18  06:00    


 


Random Glucose  104 mg/dL ()   05/22/18  06:00    


 


Calcium  8.5 mg/dL (8.5-10.1)   05/22/18  06:00    


 


Total Bilirubin  0.2 mg/dL (0.2-1.0)   05/21/18  06:00    


 


AST  21 U/L (15-37)   05/21/18  06:00    


 


ALT  47 U/L (12-78)   05/21/18  06:00    


 


Alkaline Phosphatase  97 U/L ()   05/21/18  06:00    


 


Total Protein  6.2 g/dl (6.4-8.2)  L  05/21/18  06:00    


 


Albumin  3.4 g/dl (3.4-5.0)   05/21/18  06:00    








 Microbiology





05/18/18 08:30   Cerebral Spinal Fluid - Lumbar Puncture   Gram Stain - Final





Imaging: 


- MRI brain (5/14/18): There is faint focal linear-like increased T2 signal 

intensity in superior aspect of the right cerebellum that appears to be 

enhancing on the post contrast examination. There is also leptomeningeal 

enhancement within the superior cerebellar folia bilaterally. Leptomeningeal 

enchancement along posterior margin of the clivus as well as dural enhancement 

in the included portion of the upper cervical spine





Assessment: This is a 67 year old female with PMHx of osteoarthritis, GERD, 

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 

presented to the ED with headache, nausea, vertigo, and diarrhea/indigestion x1 

week. 





Plan: 


1) Hypertension


- Acute change in status this morning after being laid flat, patient then 

became bradycardic, hypertensive and nauseous


- EKG with acute changes; peaked t-waves in V3-V6


- Discussed with cardiology as above; repeat EKG, cardiac enzymes (patient 

denies any chest pain), cmp


- Stat head CT for headache


- Stat ECHO


- Transfer to ICU





2) Metastatic right breast cancer


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


- LP not completed this morning 2/2 acute change in status


- For Ommaya and IT therapy tomorrow with Dr. Verde per discussion with Dr. Reddy


- Continue Decadron


- Pain management


- Appreciate oncology consult





3) Leukocytosis


- Likely 2/2 high doses of steroids


- Will continue to trend


- No fevers


- F/u UA





4) Bradycardia and EKG changes


- As above


- Hold propranolol


- Appreciate cardiology consult





5) F/E/N:


- Regular diet


- Monitor electrolytes





6) Prophylaxis: 


- OOB ambulating


- Patient has been refusing Heparin, will hold anyway, patient to get Ommaya 

today





7) Dispo:


- Requires continued inpatient care





CODE STATUS: FULL CODE





Visit type





- Emergency Visit


Emergency Visit: Yes


ED Registration Date: 05/17/18


Care time: The patient presented to the Emergency Department on the above date 

and was hospitalized for further evaluation of their emergent condition.





- New Patient


This patient is new to me today: No





- Critical Care


Critical Care patient: Yes


Total Critical Care Time (in minutes): 55


Critical Care Statement: The care of this patient involved high complexity 

decision making to prevent further life threatening deterioration of the patient

's condition and/or to evaluate & treat vital organ system(s) failure or risk 

of failure.

## 2018-05-23 NOTE — CONSULT
Consultation: 


REQUESTING PROVIDER:





CONSULT REQUEST: We have been asked to medically evaluate this patient for 

bradycardia and hypertension; 





HISTORY OF PRESENT ILLNESS:


This is a 67 year old female with stage IV triple negative metastatic breast 

cancer. We were called to evaluate for bradycardia, hypertension, systolic 190s/

80s and headache and blurry vision. 


Stat head CT negative for acute bleed or acute etiology.


Neurology and neurosurgery, cardiology following. Plan for Ommaya placement for 

intrathecal chemotherapy. 





REVIEW OF SYSTEMS:


CONSTITUTIONAL: 


Absent:  fever, chills, diaphoresis, generalized weakness, malaise, loss of 

appetite, weight change


HEENT: 


Positive:headache


Absent:  rhinorrhea, nasal congestion, throat pain, throat swelling, difficulty 

swallowing, mouth swelling, ear pain, eye pain, visual changes


CARDIOVASCULAR: 


Absent: chest pain, syncope, palpitations, irregular heart rate, lightheadedness

, peripheral edema


RESPIRATORY: 


Absent: cough, shortness of breath, dyspnea with exertion, orthopnea, wheezing, 

stridor, hemoptysis


GASTROINTESTINAL:


Absent: abdominal pain, abdominal distension, nausea, vomiting, diarrhea, 

constipation, melena, hematochezia


GENITOURINARY: 


Absent: dysuria, frequency, urgency, hesitancy, hematuria, flank pain, genital 

pain


MUSCULOSKELETAL: 


Absent: myalgia, arthralgia, joint swelling, back pain, neck pain


SKIN: 


Absent: rash, itching, pallor


HEMATOLOGIC/IMMUNOLOGIC: 


Absent: easy bleeding, easy bruising, lymphadenopathy, frequent infections


ENDOCRINE:


Absent: unexplained weight gain, unexplained weight loss, heat intolerance, 

cold intolerance


NEUROLOGIC: 


Absent: headache, focal weakness or paresthesias, dizziness, unsteady gait, 

seizure, mental status changes, bladder or bowel incontinence


PSYCHIATRIC: 


Absent: anxiety, depression, suicidal or homicidal ideation, hallucinations.





PHYSICAL EXAMINATION





 Vital Signs - 24 hr











  05/22/18 05/22/18 05/22/18





  17:19 21:00 22:50


 


Temperature 98.2 F  97.6 F


 


Pulse Rate 67  65


 


Respiratory 18  18





Rate   


 


Blood Pressure 165/85  160/98


 


O2 Sat by Pulse  98 





Oximetry (%)   














  05/23/18 05/23/18 05/23/18





  08:30 08:40 09:00


 


Temperature 97.5 F L  


 


Pulse Rate 49 L 56 L 


 


Respiratory 19 18 





Rate   


 


Blood Pressure 168/72 189/77 


 


O2 Sat by Pulse   93 L





Oximetry (%)   














  05/23/18





  14:00


 


Temperature 97.4 F L


 


Pulse Rate 79


 


Respiratory 17





Rate 


 


Blood Pressure 176/87


 


O2 Sat by Pulse 





Oximetry (%) 














GENERAL: Awake, alert, and fully oriented, with headache


HEAD: Normal with no signs of trauma.


EYES: Pupils equal, round and reactive to light, extraocular movements intact, 

sclera anicteric, conjunctiva clear. No lid lag.


EARS, NOSE, THROAT: throat with thrush


LUNGS: Breath sounds equal, clear to auscultation bilaterally. No wheezes, and 

no crackles. No accessory muscle use.


HEART: Regular rate and rhythm, normal S1 and S2 without murmur, rub or gallop.


ABDOMEN: Soft, nontender, not distended, normoactive bowel sounds, no guarding, 

no rebound, no masses.  No hepatomegaly or splenomegaly. 


UPPER EXTREMITIES: 2+ pulses, warm, well-perfused. No cyanosis. No clubbing. 

Cap refill <2 seconds. No peripheral edema.


LOWER EXTREMITIES: 2+ pulses, warm, well-perfused. No calf tenderness. No 

peripheral edema. 


NEUROLOGICAL:  Cranial nerves II-XII intact. Normal speech.











 Laboratory Results - last 24 hr











  05/23/18 05/23/18 05/23/18





  07:15 11:55 11:55


 


WBC  12.2 H  


 


RBC  3.27 L  


 


Hgb  11.6  


 


Hct  34.7  


 


MCV  106.1 H  


 


MCH  35.5 H  


 


MCHC  33.5  


 


RDW  18.6 H  


 


Plt Count  241  


 


MPV  7.1 L  


 


Neutrophils %  79.1  


 


Neutrophils % (Manual)  84.7 H  


 


Band Neutrophils %  0.0  


 


Lymphocytes %  7.7 L D  


 


Lymphocytes % (Manual)  4.1 L D  


 


Monocytes %  13.1 H  


 


Monocytes % (Manual)  8  


 


Eosinophils %  0.0  


 


Eosinophils % (Manual)  0.0  


 


Basophils %  0.1  


 


Basophils % (Manual)  0.0  


 


Myelocytes % (Man)  3 H  


 


Promyelocytes % (Man)  0  


 


Blast Cells % (Manual)  0  


 


Nucleated RBC %  0  


 


Metamyelocytes  0  D  


 


Hypochromia  1+  


 


Platelet Estimate  Normal  


 


Polychromasia  1+  


 


Anisocytosis  1+  


 


Macrocytosis  2+  


 


Tear Drop Cells  1+  


 


PT with INR    12.10


 


INR    1.07


 


PTT (Actin FS)    45.6 H D


 


Sodium   132 L 


 


Potassium   3.9 


 


Chloride   95 L 


 


Carbon Dioxide   31 


 


Anion Gap   6 L 


 


BUN   11 


 


Creatinine   0.4 L 


 


Creat Clearance w eGFR   > 60 


 


Random Glucose   126 H 


 


Calcium   8.1 L 


 


Total Bilirubin   0.5  D 


 


AST   19 


 


ALT   35 


 


Alkaline Phosphatase   86 


 


Creatine Kinase   66 


 


Troponin I   < 0.02 


 


Total Protein   6.8 


 


Albumin   3.4 


 


Urine Color   


 


Urine Appearance   


 


Urine pH   


 


Ur Specific Gravity   


 


Urine Protein   


 


Urine Glucose (UA)   


 


Urine Ketones   


 


Urine Blood   


 


Urine Nitrite   


 


Urine Bilirubin   


 


Urine Urobilinogen   


 


Ur Leukocyte Esterase   














  05/23/18





  12:15


 


WBC 


 


RBC 


 


Hgb 


 


Hct 


 


MCV 


 


MCH 


 


MCHC 


 


RDW 


 


Plt Count 


 


MPV 


 


Neutrophils % 


 


Neutrophils % (Manual) 


 


Band Neutrophils % 


 


Lymphocytes % 


 


Lymphocytes % (Manual) 


 


Monocytes % 


 


Monocytes % (Manual) 


 


Eosinophils % 


 


Eosinophils % (Manual) 


 


Basophils % 


 


Basophils % (Manual) 


 


Myelocytes % (Man) 


 


Promyelocytes % (Man) 


 


Blast Cells % (Manual) 


 


Nucleated RBC % 


 


Metamyelocytes 


 


Hypochromia 


 


Platelet Estimate 


 


Polychromasia 


 


Anisocytosis 


 


Macrocytosis 


 


Tear Drop Cells 


 


PT with INR 


 


INR 


 


PTT (Actin FS) 


 


Sodium 


 


Potassium 


 


Chloride 


 


Carbon Dioxide 


 


Anion Gap 


 


BUN 


 


Creatinine 


 


Creat Clearance w eGFR 


 


Random Glucose 


 


Calcium 


 


Total Bilirubin 


 


AST 


 


ALT 


 


Alkaline Phosphatase 


 


Creatine Kinase 


 


Troponin I 


 


Total Protein 


 


Albumin 


 


Urine Color  Ltyellow


 


Urine Appearance  Clear


 


Urine pH  7.0


 


Ur Specific Gravity  1.018


 


Urine Protein  Negative


 


Urine Glucose (UA)  Negative


 


Urine Ketones  Trace H


 


Urine Blood  Negative


 


Urine Nitrite  Negative


 


Urine Bilirubin  Negative


 


Urine Urobilinogen  Negative


 


Ur Leukocyte Esterase  Negative








Active Medications











Generic Name Dose Route Start Last Admin





  Trade Name Freq  PRN Reason Stop Dose Admin


 


Acetaminophen  650 mg  05/23/18 10:56  





  Tylenol -  PO   





  Q4H PRN   





  HEADACHE   





     





     





     


 


Acetaminophen  325 mg  05/23/18 10:56  





  Tylenol -  PO   





  Q8H PRN   





  PAIN 1-3   





     





     





     


 


Chlorhexidine Gluconate  1 applic  05/23/18 22:00  





  Hibiclens For Decolonization -  TP   





  [facility]   





     





     





     





     


 


Dexamethasone Sodium Phosphate  4 mg  05/23/18 12:30  05/23/18 11:48





  Decadron Injection -  IVPUSH   4 mg





  Q6H [facility]   Administration





     





     





     





     


 


Fentanyl  1 patch  05/25/18 09:15  





  Duragesic 12mcg Patch -  TD  05/29/18 09:14  





  Q72H [facility]   





     





     





     





     


 


Magnesium Oxide  400 mg  05/23/18 22:00  





  Mag-Ox -  PO   





  BID [facility]   





     





     





     





     


 


Miscellaneous  1 each  05/23/18 10:56  





  Duragesic Patch Waste  TD   





  PRN PRN   





  PAIN   





     





     





     


 


Mupirocin  1 applic  05/23/18 10:00  





  Bactroban Ointment (For Decolonization) -  NS  05/28/18 09:59  





  BID ANTONIO   





     





     





     





     


 


Nystatin  500,000 units  05/23/18 12:00  05/23/18 11:26





  Nystatin Oral Suspension -  PO   500,000 units





  Q6HPO ANTONIO   Administration





     





     





     





     


 


Ondansetron HCl  8 mg  05/23/18 14:46  





  Zofran Injection  IVPB   





  Q8H PRN   





  NAUSEA   





     





     





     


 


Oxycodone HCl  5 mg  05/23/18 10:56  





  Roxicodone -  PO   





  Q4H PRN   





  PAIN 4-6   





     





     





     


 


Oxycodone HCl  10 mg  05/23/18 10:56  05/23/18 14:22





  Roxicodone -  PO   10 mg





  Q4H PRN   Administration





  PAIN LEVEL 6-10   





     





     





     


 


Pantoprazole Sodium  40 mg  05/24/18 10:00  





  Protonix -  PO   





  DAILY ANTONIO   





     





     





     





     


 


Prochlorperazine Edisylate  10 mg  05/23/18 10:56  





  Compazine Injection -  IVPB   





  Q4H PRN   





  NAUSEA AND/OR VOMITING   





     





     





     


 


Prochlorperazine Maleate  10 mg  05/23/18 10:56  





  Compazine -  PO   





  Q4H PRN   





  NAUSEA AND/OR VOMITING   





     





     





     











ASSESSMENT/PLAN:


This is a 67 year old female with stage IV breast Ca , hyertensive urgency, 

bradycardia and blurry vision. 





#hypertensive urgency


-head CT negative


-concern for ICO; LP ?


-cont steroids


-bp control keep systolic <160s


-norvasc x1 given with adequate response


-cosmo cardio/neuro recs





#stage IV breast CA: with suspected Suspected Leptomeningeal Involvement


[facility]


-place tomorrow; npo overnight


-cont IV steroids


-repeat PTT in am before sx


-antiemetics; pain control


-cosmo heme/once/neuro





#thrush; shwish and swallow after procedure





DVT ppl scds for now can start heparin sq after procedure


GI ppl;





Cont ICU monitoring; 











Visit type





- Emergency Visit


Emergency Visit: Yes


ED Registration Date: 05/17/18


Care time: The patient presented to the Emergency Department on the above date 

and was hospitalized for further evaluation of their emergent condition.





- New Patient


This patient is new to me today: Yes


Date on this admission: 05/23/18





- Critical Care


Critical Care patient: Yes


Total Critical Care Time (in minutes): 35


Critical Care Statement: The care of this patient involved high complexity 

decision making to prevent further life threatening deterioration of the patient

's condition and/or to evaluate & treat vital organ system(s) failure or risk 

of failure.

## 2018-05-23 NOTE — CONS
DATE OF CONSULTATION:  05/23/2018

 

REQUESTING PHYSICIAN:  Radha Estevez NP

 

REASON FOR CONSULTATION:  Hypertension and bradycardia.

 

HISTORY:  The patient is a 67-year-old female who was admitted on May 17th with

several weeks of headaches and nausea.  She has a significant past medical history of

widely metastatic triple-negative breast cancer receiving chemotherapy with Taxol and

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  She has metastases to the chest wall,

spine, and also suspected in the liver.  She describes having several weeks of

headaches and intermittent nausea, which acutely worsened this morning approximately

30 minutes ago.  She was found to be newly bradycardic and with worsening

hypertension at which point I was asked to see her.  She is sitting in bed with eyes

closed able to answer questions.  She denies chest pain, shortness of breath, or

palpitations.  She describes her headache as being much worse this morning with

worsened nausea.  She denies chills.  No focal neurological deficits that are new. 

She denies photophobia at this time.

 

PAST MEDICAL HISTORY:  As outlined above.  She also underwent left femur ORIF for

pathological fracture.  She also has a history of hypertension and GERD.

 

ALLERGIES:  ADHESIVE TAPE.

 

CURRENT MEDICATIONS:  Tylenol 650 p.o. q.4 p.r.n., Decadron 4 mg p.o. q.i.d.,

Duragesic patch 12 mcg q.72 hours, magnesium oxide 400 mg p.o. b.i.d., Zofran 4 mg IV

q.8, p.r.n., oxycodone 5 mg p.o. q.4 p.r.n., oxycodone 10 mg p.o. q.4 p.r.n.,

Protonix 40 mg p.o. daily, Compazine 10 mg IV q.4 p.r.n., propofol 40 mg p.o. daily.

 

FAMILY HISTORY:  Noncontributory to this presentation.

 

SOCIAL HISTORY:  Nonsmoker.  Retired nurse.

 

PHYSICAL EXAMINATION: 

General:  Lying in bed with eyes closed but no acute distress.

Vital Signs:  Blood pressure 97.6, pulse 65, blood pressure 160/98, previous blood

pressure trend in the 120-130 systolic range, O2 saturation 98 on room air.

HEENT:  She is anicteric.  

Neck:  There is no carotid bruits, 2+ carotid pulses bilaterally.

Heart:  S1, S2.  Regular rate and rhythm.  Soft, systolic murmur at the right sternal

border, which increases with inspiration.

Chest:  Clear to auscultation bilaterally with no wheezing, rhonchi, or rales.

Abdomen:  Soft and nontender.

Extremities:  Warm.  No edema with 2+ radial, 2+ dorsalis pedis pulses bilaterally.

 

EKG was performed acutely showing sinus bradycardia at 45 beats per minute with

hyperacute T waves in comparison to the ECG May 22.  Changes have occurred. 

Previously normal sinus rhythm at 71 beats per minute with left atrial enlargement,

LVH.  

 

LABORATORIES:  White count 12.2, hemoglobin 11.6, platelet count 241, INR 1.1. 

Sodium 141, potassium 3.9, creatinine 0.5.  CSF from May 18:  No PMNs, no organisms. 

CT chest, abdomen, pelvis was performed on May 17 for staging and showed a 2-cm right

hepatic lobe hypodense focus suggestive of metastatic disease, 2.3-cm x 1.4-cm soft

tissue lesion in the ampulla, multifocal osseous neoplastic lesion in the

thoracolumbar spine, ribs, and pelvis.

 

IMPRESSION: 

1.  Widely metastatic "triple-negative" breast cancer on chemotherapy with suspected

leptomeningeal disease.

2.  Several weeks of headaches now acutely worsened with new bradycardia and worsened

hypertension raising the concern for an acute intracranial process/intracranial

hemorrhage.

 

RECOMMENDATION:

1.  STAT head CT to rule out intracranial bleed.

2.  ICU monitoring.

3.  Repeat EKG in 30 minutes, repeat complete labs, cardiac enzymes for

echocardiogram to evaluate murmur and EF.

4.  Neurology and neurosurgical follow up.  We will follow.

 

Above plan discussed with primary medical team.

 

 

 

SNEHA VELA M.D.

 

[id number]

DD: 05/23/2018 09:57

DT: 05/23/2018 11:06

Job #:  [id number]

## 2018-05-24 LAB
ALBUMIN SERPL-MCNC: 3.8 G/DL (ref 3.4–5)
ALP SERPL-CCNC: 92 U/L (ref 45–117)
ALT SERPL-CCNC: 35 U/L (ref 12–78)
ANION GAP SERPL CALC-SCNC: 10 MMOL/L (ref 8–16)
ANION GAP SERPL CALC-SCNC: 8 MMOL/L (ref 8–16)
ANION GAP SERPL CALC-SCNC: 8 MMOL/L (ref 8–16)
APTT BLD: 27.8 SECONDS (ref 26.9–34.4)
AST SERPL-CCNC: 19 U/L (ref 15–37)
BASOPHILS # BLD: 0.5 % (ref 0–2)
BILIRUB SERPL-MCNC: 0.8 MG/DL (ref 0.2–1)
BUN SERPL-MCNC: 11 MG/DL (ref 7–18)
BUN SERPL-MCNC: 11 MG/DL (ref 7–18)
BUN SERPL-MCNC: 12 MG/DL (ref 7–18)
CALCIUM SERPL-MCNC: 8 MG/DL (ref 8.5–10.1)
CALCIUM SERPL-MCNC: 8.1 MG/DL (ref 8.5–10.1)
CALCIUM SERPL-MCNC: 8.6 MG/DL (ref 8.5–10.1)
CHLORIDE SERPL-SCNC: 89 MMOL/L (ref 98–107)
CHLORIDE SERPL-SCNC: 91 MMOL/L (ref 98–107)
CHLORIDE SERPL-SCNC: 92 MMOL/L (ref 98–107)
CO2 SERPL-SCNC: 26 MMOL/L (ref 21–32)
CO2 SERPL-SCNC: 28 MMOL/L (ref 21–32)
CO2 SERPL-SCNC: 28 MMOL/L (ref 21–32)
CREAT SERPL-MCNC: 0.4 MG/DL (ref 0.55–1.02)
CREAT SERPL-MCNC: 0.5 MG/DL (ref 0.55–1.02)
CREAT SERPL-MCNC: 0.6 MG/DL (ref 0.55–1.02)
DEPRECATED RDW RBC AUTO: 18.5 % (ref 11.6–15.6)
EOSINOPHIL # BLD: 0 % (ref 0–4.5)
GLUCOSE SERPL-MCNC: 164 MG/DL (ref 74–106)
GLUCOSE SERPL-MCNC: 165 MG/DL (ref 74–106)
GLUCOSE SERPL-MCNC: 237 MG/DL (ref 74–106)
HCT VFR BLD CALC: 40.1 % (ref 32.4–45.2)
HGB BLD-MCNC: 13.4 GM/DL (ref 10.7–15.3)
INR BLD: 1.05 (ref 0.82–1.09)
LYMPHOCYTES # BLD: 5.7 % (ref 8–40)
MACROCYTES BLD QL: (no result)
MAGNESIUM SERPL-MCNC: 1.9 MG/DL (ref 1.8–2.4)
MCH RBC QN AUTO: 35.2 PG (ref 25.7–33.7)
MCHC RBC AUTO-ENTMCNC: 33.3 G/DL (ref 32–36)
MCV RBC: 105.7 FL (ref 80–96)
MONOCYTES # BLD AUTO: 10 % (ref 3.8–10.2)
NEUTROPHILS # BLD: 83.8 % (ref 42.8–82.8)
OVALOCYTES BLD QL SMEAR: (no result)
PHOSPHATE SERPL-MCNC: 2.5 MG/DL (ref 2.5–4.9)
PLATELET # BLD AUTO: 259 K/MM3 (ref 134–434)
PLATELET BLD QL SMEAR: (no result)
PMV BLD: 7.4 FL (ref 7.5–11.1)
POTASSIUM SERPLBLD-SCNC: 3.7 MMOL/L (ref 3.5–5.1)
POTASSIUM SERPLBLD-SCNC: 3.7 MMOL/L (ref 3.5–5.1)
POTASSIUM SERPLBLD-SCNC: 3.8 MMOL/L (ref 3.5–5.1)
PROT SERPL-MCNC: 7.4 G/DL (ref 6.4–8.2)
PT PNL PPP: 11.9 SEC (ref 9.7–13)
RBC # BLD AUTO: 3.8 M/MM3 (ref 3.6–5.2)
SODIUM SERPL-SCNC: 125 MMOL/L (ref 136–145)
SODIUM SERPL-SCNC: 127 MMOL/L (ref 136–145)
SODIUM SERPL-SCNC: 128 MMOL/L (ref 136–145)
WBC # BLD AUTO: 20.9 K/MM3 (ref 4–10)

## 2018-05-24 PROCEDURE — 009U3ZX DRAINAGE OF SPINAL CANAL, PERCUTANEOUS APPROACH, DIAGNOSTIC: ICD-10-PCS

## 2018-05-24 PROCEDURE — 3E0Q305 INTRODUCTION OF OTHER ANTINEOPLASTIC INTO CRANIAL CAVITY AND BRAIN, PERCUTANEOUS APPROACH: ICD-10-PCS

## 2018-05-24 PROCEDURE — 00H603Z INSERTION OF INFUSION DEVICE INTO CEREBRAL VENTRICLE, OPEN APPROACH: ICD-10-PCS

## 2018-05-24 RX ADMIN — DEXAMETHASONE SODIUM PHOSPHATE SCH MG: 4 INJECTION, SOLUTION INTRAMUSCULAR; INTRAVENOUS at 17:51

## 2018-05-24 RX ADMIN — CHLORHEXIDINE GLUCONATE SCH APPLIC: 213 SOLUTION TOPICAL at 23:17

## 2018-05-24 RX ADMIN — MAGNESIUM OXIDE TAB 400 MG (241.3 MG ELEMENTAL MG) SCH: 400 (241.3 MG) TAB at 09:26

## 2018-05-24 RX ADMIN — DOCUSATE SODIUM SCH: 100 CAPSULE, LIQUID FILLED ORAL at 14:00

## 2018-05-24 RX ADMIN — DEXAMETHASONE SODIUM PHOSPHATE SCH MG: 4 INJECTION, SOLUTION INTRAMUSCULAR; INTRAVENOUS at 05:57

## 2018-05-24 RX ADMIN — MUPIROCIN SCH APPLIC: 20 OINTMENT TOPICAL at 09:27

## 2018-05-24 RX ADMIN — AMLODIPINE BESYLATE SCH: 5 TABLET ORAL at 09:26

## 2018-05-24 RX ADMIN — DEXAMETHASONE SODIUM PHOSPHATE SCH MG: 4 INJECTION, SOLUTION INTRAMUSCULAR; INTRAVENOUS at 13:21

## 2018-05-24 RX ADMIN — ONDANSETRON PRN MG: 2 INJECTION INTRAMUSCULAR; INTRAVENOUS at 08:36

## 2018-05-24 RX ADMIN — DEXAMETHASONE SODIUM PHOSPHATE SCH MG: 4 INJECTION, SOLUTION INTRAMUSCULAR; INTRAVENOUS at 23:32

## 2018-05-24 RX ADMIN — NYSTATIN SCH UNITS: 100000 SUSPENSION ORAL at 00:00

## 2018-05-24 RX ADMIN — DOCUSATE SODIUM SCH: 100 CAPSULE, LIQUID FILLED ORAL at 21:26

## 2018-05-24 RX ADMIN — MUPIROCIN SCH APPLIC: 20 OINTMENT TOPICAL at 22:17

## 2018-05-24 RX ADMIN — NYSTATIN SCH UNITS: 100000 SUSPENSION ORAL at 12:57

## 2018-05-24 RX ADMIN — DEXAMETHASONE SODIUM PHOSPHATE SCH MG: 4 INJECTION, SOLUTION INTRAMUSCULAR; INTRAVENOUS at 00:57

## 2018-05-24 RX ADMIN — NYSTATIN SCH: 100000 SUSPENSION ORAL at 23:32

## 2018-05-24 RX ADMIN — MAGNESIUM OXIDE TAB 400 MG (241.3 MG ELEMENTAL MG) SCH: 400 (241.3 MG) TAB at 21:27

## 2018-05-24 RX ADMIN — NYSTATIN SCH: 100000 SUSPENSION ORAL at 17:21

## 2018-05-24 RX ADMIN — ACETAMINOPHEN PRN MG: 10 INJECTION, SOLUTION INTRAVENOUS at 07:55

## 2018-05-24 RX ADMIN — CEFAZOLIN SCH MLS/HR: 1 INJECTION, POWDER, FOR SOLUTION INTRAVENOUS at 17:20

## 2018-05-24 RX ADMIN — NYSTATIN SCH UNITS: 100000 SUSPENSION ORAL at 05:57

## 2018-05-24 NOTE — PN
Physical Exam: 


SUBJECTIVE: Patient seen and examined in ICU. She is nauseated, with HA, feels 

no better than yesterday. Keeps eyes closed








OBJECTIVE:





 Vital Signs











 Period  Temp  Pulse  Resp  BP Sys/Zhao  Pulse Ox


 


 Last 24 Hr  97.4 F-98.5 F  51-79  16-21  147-180/71-87  








PE:


General: Malaise, eyes closed, patient reports not feeling well, face red 


Pulm: no sob, regular 


CV: s1 s2 rrr


Abd: soft, nd 


Ext: no le edema 








 Laboratory Results - last 24 hr











  05/24/18 05/24/18 05/24/18





  05:25 05:25 05:25


 


WBC  20.9 H D  


 


RBC  3.80  


 


Hgb  13.4  D  


 


Hct  40.1  D  


 


MCV  105.7 H  


 


MCH  35.2 H  


 


MCHC  33.3  


 


RDW  18.5 H  


 


Plt Count  259  


 


MPV  7.4 L  


 


Neutrophils %  83.8 H  


 


Neutrophils % (Manual)   


 


Band Neutrophils %   


 


Lymphocytes %  5.7 L D  


 


Lymphocytes % (Manual)   


 


Monocytes %  10.0  


 


Monocytes % (Manual)   


 


Eosinophils %  0.0  


 


Eosinophils % (Manual)   


 


Basophils %  0.5  D  


 


Basophils % (Manual)   


 


Myelocytes % (Man)   


 


Promyelocytes % (Man)   


 


Blast Cells % (Manual)   


 


Nucleated RBC %  0  


 


Metamyelocytes   


 


Hypochromia   


 


Platelet Estimate   


 


Polychromasia   


 


Anisocytosis   


 


Macrocytosis   


 


Tear Drop Cells   


 


PT with INR   11.90 


 


INR   1.05 


 


PTT (Actin FS)   27.8 


 


Sodium    125 L


 


Potassium    3.8


 


Chloride    89 L


 


Carbon Dioxide    28


 


Anion Gap    8


 


BUN    11


 


Creatinine    0.4 L


 


Creat Clearance w eGFR    > 60


 


Random Glucose    237 H


 


Calcium    8.6


 


Phosphorus    2.5


 


Magnesium    1.9


 


Total Bilirubin    0.8  D


 


AST    19


 


ALT    35


 


Alkaline Phosphatase    92


 


Creatine Kinase   


 


Troponin I   


 


Total Protein    7.4


 


Albumin    3.8


 


Urine Color   


 


Urine Appearance   


 


Urine pH   


 


Ur Specific Gravity   


 


Urine Protein   


 


Urine Glucose (UA)   


 


Urine Ketones   


 


Urine Blood   


 


Urine Nitrite   


 


Urine Bilirubin   


 


Urine Urobilinogen   


 


Ur Leukocyte Esterase   


 


Blood Type   


 


Antibody Screen   








Active Medications











Generic Name Dose Route Start Last Admin





  Trade Name Freq  PRN Reason Stop Dose Admin


 


Acetaminophen  650 mg  05/23/18 10:56  05/23/18 20:08





  Tylenol -  PO   650 mg





  Q4H PRN   Administration





  HEADACHE   





     





     





     


 


Acetaminophen  1,000 mg  05/23/18 23:18  05/24/18 07:55





  Ofirmev Injection -  IVPB   1,000 mg





  Q6H PRN   Administration





  HEADACHE   





     





     





     


 


Amlodipine Besylate  5 mg  05/24/18 10:00  05/24/18 09:26





  Norvasc -  PO   Not Given





  DAILY [facility]   





     





     





     





     


 


Chlorhexidine Gluconate  1 applic  05/23/18 22:00  05/23/18 23:48





  Hibiclens For Decolonization -  TP   1 applic





  HS ANTONIO   Administration





     





     





     





     


 


Dexamethasone Sodium Phosphate  4 mg  05/23/18 12:30  05/24/18 05:57





  Decadron Injection -  IVPUSH   4 mg





  Q6H ANTONIO   Administration





     





     





     





     


 


Fentanyl  1 patch  05/25/18 09:15  





  Duragesic 12mcg Patch -  TD  05/29/18 09:14  





  Q72H [facility]   





     





     





     





     


 


Fentanyl  50 mcg  05/24/18 04:55  05/24/18 06:18





  Sublimaze Injection -  IVPUSH  05/25/18 04:59  50 mcg





  Q3H PRN   Administration





  PAIN LEVEL 7 - 10   





     





     





     


 


Sodium Chloride  1,000 mls @ 60 mls/hr  05/24/18 09:51  





  Normal Saline -  IV  05/25/18 07:54  





  ASDIR [facility]   





     





     





     





     


 


Magnesium Oxide  400 mg  05/23/18 22:00  05/24/18 09:26





  Mag-Ox -  PO   Not Given





  BID [facility]   





     





     





     





     


 


Miscellaneous  1 each  05/23/18 10:56  





  Duragesic Patch Waste  TD   





  PRN PRN   





  PAIN   





     





     





     


 


Mupirocin  1 applic  05/23/18 10:00  05/24/18 09:27





  Bactroban Ointment (For Decolonization) -  NS  05/28/18 09:59  1 applic





  BID ANTONIO   Administration





     





     





     





     


 


Nystatin  500,000 units  05/23/18 12:00  05/24/18 05:57





  Nystatin Oral Suspension -  PO   500,000 units





  Q6HPO ANTONIO   Administration





     





     





     





     


 


Ondansetron HCl  8 mg  05/23/18 14:46  05/24/18 08:36





  Zofran Injection  IVPB   8 mg





  Q8H PRN   Administration





  NAUSEA   





     





     





     


 


Oxycodone HCl  5 mg  05/23/18 10:56  





  Roxicodone -  PO   





  Q4H PRN   





  PAIN 4-6   





     





     





     


 


Oxycodone HCl  10 mg  05/23/18 10:56  05/23/18 20:09





  Roxicodone -  PO   10 mg





  Q4H PRN   Administration





  PAIN LEVEL 6-10   





     





     





     


 


Pantoprazole Sodium  40 mg  05/24/18 10:00  05/24/18 09:27





  Protonix -  PO   Not Given





  DAILY ANTONIO   





     





     





     





     


 


Prochlorperazine Edisylate  10 mg  05/23/18 10:56  





  Compazine Injection -  IVPB   





  Q4H PRN   





  NAUSEA AND/OR VOMITING   





     





     





     


 


Prochlorperazine Maleate  10 mg  05/23/18 10:56  





  Compazine -  PO   





  Q4H PRN   





  NAUSEA AND/OR VOMITING   





     





     





     











Imaging: 


- MRI brain (5/14/18): There is faint focal linear-like increased T2 signal 

intensity in superior aspect of the right cerebellum that appears to be 

enhancing on the post contrast examination. There is also leptomeningeal 

enhancement within the superior cerebellar folia bilaterally. Leptomeningeal 

enchancement along posterior margin of the clivus as well as dural enhancement 

in the included portion of the upper cervical spine





Assessment: 67 year old female with PMHx of osteoarthritis, GERD, triple 

negative metastatic right breast cancer (bone) diagnosed 9/2017 admitted with 

headache, nausea, vertigo, and diarrhea/indigestion x1 week. 





Plan: 


1. HTN


- Elevated overnight 


- Hydralazine 10mg x1 this 


- Started norvasc 5mg daily





2. Metastatic right breast cancer


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


- For Ommaya and IT therapy today in OR, PTT wnl today no FFP needed at this 

time 


- Continue Decadron


- Cytology sent from OR, follow results





3. Hyponatremia


- Stop d5 1/2ns 


- Start NS 60cc/hr, check sodium after OR 





4. UTI, leukocytosis 


- Urine cx with pending organism, UA neg, blood cx neg


- Start ceftriaxone await sensitives 





5. Bradycardia 


- HR normalized this AM 


- Hold propranolol, initially used for tremors 


- Cardiology following 





6. DVT ppx 


- OOB ambulating


- Patient has been refusing Heparin, hold for OR today 





Problem List





- Problems


(1) Breast cancer metastasized to bone


Code(s): C50.919 - MALIGNANT NEOPLASM OF UNSP SITE OF UNSPECIFIED FEMALE BREAST

; C79.51 - SECONDARY MALIGNANT NEOPLASM OF BONE   


Qualifiers: 


   Laterality: right   Qualified Code(s): C50.911 - Malignant neoplasm of 

unspecified site of right female breast; C79.51 - Secondary malignant neoplasm 

of bone; C79.51 - Secondary malignant neoplasm of bone; C79.51 - Secondary 

malignant neoplasm of bone; C79.51 - Secondary malignant neoplasm of bone   





(2) Headache


Code(s): R51 - HEADACHE   


Qualifiers: 


   Headache type: unspecified   Headache chronicity pattern: unspecified 

pattern   Intractability: intractable   Qualified Code(s): R51 - Headache   





(3) Leptomeningeal metastases


Code(s): C79.49 - SECONDARY MALIGNANT NEOPLASM OF OTH PARTS OF NERVOUS SYSTEM   





(4) Nausea


Code(s): R11.0 - NAUSEA   





Visit type





- Emergency Visit


Emergency Visit: Yes


ED Registration Date: 05/17/18


Care time: The patient presented to the Emergency Department on the above date 

and was hospitalized for further evaluation of their emergent condition.





- New Patient


This patient is new to me today: No





- Critical Care


Critical Care patient: No

## 2018-05-24 NOTE — PN
Teaching Attending Note


Name of Resident: Kajal Parkinson





ATTENDING PHYSICIAN STATEMENT





I saw and evaluated the patient.


I reviewed the resident's note and discussed the case with the resident.


I agree with the resident's findings and plan as documented.








SUBJECTIVE:


Patient seen and examined in the ICU.  Drowsy but arousable.  


(+) HA 


No CP or SOB.  








 Intake & Output











 05/21/18 05/22/18 05/23/18 05/24/18





 23:59 23:59 23:59 23:59


 


Intake Total 400 


 


Output Total   1650 425


 


Balance 400 520 -1275 1075


 


Weight 206 lb 12.8 oz   207 lb 10.807 oz








 Last Vital Signs











Temp Pulse Resp BP Pulse Ox


 


 97.5 F L  67   18   148/73   100 


 


 05/24/18 10:00  05/24/18 10:00  05/24/18 10:00  05/24/18 10:00  05/24/18 08:00








Active Medications





Acetaminophen (Tylenol -)  650 mg PO Q4H PRN


   PRN Reason: HEADACHE


   Last Admin: 05/23/18 20:08 Dose:  650 mg


Acetaminophen (Ofirmev Injection -)  1,000 mg IVPB Q6H PRN


   PRN Reason: HEADACHE


   Last Admin: 05/24/18 07:55 Dose:  1,000 mg


Acetaminophen/Codeine Phosphate (Tylenol # 3 -)  1 tab PO Q4H PRN


   PRN Reason: PAIN LEVEL 4 - 6


Amlodipine Besylate (Norvasc -)  5 mg PO DAILY [facility]


   Last Admin: 05/24/18 09:26 Dose:  Not Given


Chlorhexidine Gluconate (Hibiclens For Decolonization -)  1 applic TP HS [facility]


   Last Admin: 05/23/18 23:48 Dose:  1 applic


Dexamethasone Sodium Phosphate (Decadron Injection -)  4 mg IVPUSH Q6H [facility]


   Last Admin: 05/24/18 05:57 Dose:  4 mg


Docusate Sodium (Colace -)  100 mg PO TID [facility]


Fentanyl (Duragesic 12mcg Patch -)  1 patch TD Q72H [facility]


   Stop: 05/29/18 09:14


Fentanyl (Sublimaze Injection -)  50 mcg IVPUSH Q3H PRN


   PRN Reason: PAIN LEVEL 7 - 10


   Stop: 05/25/18 04:59


   Last Admin: 05/24/18 06:18 Dose:  50 mcg


Sodium Chloride (Normal Saline -)  1,000 mls @ 60 mls/hr IV ASDIR [facility]


   Stop: 05/25/18 07:54


Ceftriaxone Sodium 1 gm/ (Dextrose)  50 mls @ 100 mls/hr IVPB DAILY [facility]; 

Protocol


Cefazolin Sodium (Ancef 1 Gm Premixed Ivpb -)  50 mls @ 100 mls/hr IVPB Q8H-IV 

[facility]


   Stop: 05/25/18 17:59


Dextrose/Sodium Chloride (Dextrose 5%-Normal Saline+20 Meq Kcl -)  1,000 mls @ 

80 mls/hr IV ASDIR [facility]


Magnesium Oxide (Mag-Ox -)  400 mg PO BID [facility]


   Last Admin: 05/24/18 09:26 Dose:  Not Given


Miscellaneous (Duragesic Patch Waste)  1 each TD PRN PRN


   PRN Reason: PAIN


Mupirocin (Bactroban Ointment (For Decolonization) -)  1 applic NS BID [facility]


   Stop: 05/28/18 09:59


   Last Admin: 05/24/18 09:27 Dose:  1 applic


Nystatin (Nystatin Oral Suspension -)  500,000 units PO Q6HPO [facility]


   Last Admin: 05/24/18 05:57 Dose:  500,000 units


Ondansetron HCl (Zofran Injection)  8 mg IVPB Q8H PRN


   PRN Reason: NAUSEA


   Last Admin: 05/24/18 08:36 Dose:  8 mg


Oxycodone HCl (Roxicodone -)  5 mg PO Q4H PRN


   PRN Reason: PAIN 4-6


Oxycodone HCl (Roxicodone -)  10 mg PO Q4H PRN


   PRN Reason: PAIN LEVEL 6-10


   Last Admin: 05/23/18 20:09 Dose:  10 mg


Prochlorperazine Edisylate (Compazine Injection -)  10 mg IVPB Q4H PRN


   PRN Reason: NAUSEA AND/OR VOMITING


Prochlorperazine Maleate (Compazine -)  10 mg PO Q4H PRN


   PRN Reason: NAUSEA AND/OR VOMITING











Gen: Drowsy, uncomfortable


Heart: bradycardic, regular


Lung: decreased breath sounds at the baes


Abd: soft, nontender


Ext: no edema





  Laboratory Results - last 24 hr











  05/23/18 05/23/18 05/23/18





  11:55 11:55 12:15


 


WBC   


 


RBC   


 


Hgb   


 


Hct   


 


MCV   


 


MCH   


 


MCHC   


 


RDW   


 


Plt Count   


 


MPV   


 


Neutrophils %   


 


Lymphocytes %   


 


Monocytes %   


 


Eosinophils %   


 


Basophils %   


 


Nucleated RBC %   


 


PT with INR   12.10 


 


INR   1.07 


 


PTT (Actin FS)   45.6 H D 


 


Sodium  132 L  


 


Potassium  3.9  


 


Chloride  95 L  


 


Carbon Dioxide  31  


 


Anion Gap  6 L  


 


BUN  11  


 


Creatinine  0.4 L  


 


Creat Clearance w eGFR  > 60  


 


Random Glucose  126 H  


 


Calcium  8.1 L  


 


Phosphorus   


 


Magnesium   


 


Total Bilirubin  0.5  D  


 


AST  19  


 


ALT  35  


 


Alkaline Phosphatase  86  


 


Creatine Kinase  66  


 


Troponin I  < 0.02  


 


Total Protein  6.8  


 


Albumin  3.4  


 


Urine Color    Ltyellow


 


Urine Appearance    Clear


 


Urine pH    7.0


 


Ur Specific Gravity    1.018


 


Urine Protein    Negative


 


Urine Glucose (UA)    Negative


 


Urine Ketones    Trace H


 


Urine Blood    Negative


 


Urine Nitrite    Negative


 


Urine Bilirubin    Negative


 


Urine Urobilinogen    Negative


 


Ur Leukocyte Esterase    Negative


 


Blood Type   


 


Antibody Screen   














  05/23/18 05/23/18 05/24/18





  14:50 18:37 05:25


 


WBC    20.9 H D


 


RBC    3.80


 


Hgb    13.4  D


 


Hct    40.1  D


 


MCV    105.7 H


 


MCH    35.2 H


 


MCHC    33.3


 


RDW    18.5 H


 


Plt Count    259


 


MPV    7.4 L


 


Neutrophils %    83.8 H


 


Lymphocytes %    5.7 L D


 


Monocytes %    10.0


 


Eosinophils %    0.0


 


Basophils %    0.5  D


 


Nucleated RBC %    0


 


PT with INR   


 


INR   


 


PTT (Actin FS)  27.3  D  


 


Sodium   


 


Potassium   


 


Chloride   


 


Carbon Dioxide   


 


Anion Gap   


 


BUN   


 


Creatinine   


 


Creat Clearance w eGFR   


 


Random Glucose   


 


Calcium   


 


Phosphorus   


 


Magnesium   


 


Total Bilirubin   


 


AST   


 


ALT   


 


Alkaline Phosphatase   


 


Creatine Kinase   


 


Troponin I   


 


Total Protein   


 


Albumin   


 


Urine Color   


 


Urine Appearance   


 


Urine pH   


 


Ur Specific Gravity   


 


Urine Protein   


 


Urine Glucose (UA)   


 


Urine Ketones   


 


Urine Blood   


 


Urine Nitrite   


 


Urine Bilirubin   


 


Urine Urobilinogen   


 


Ur Leukocyte Esterase   


 


Blood Type   O POSITIVE 


 


Antibody Screen   Negative 














  05/24/18 05/24/18





  05:25 05:25


 


WBC  


 


RBC  


 


Hgb  


 


Hct  


 


MCV  


 


MCH  


 


MCHC  


 


RDW  


 


Plt Count  


 


MPV  


 


Neutrophils %  


 


Lymphocytes %  


 


Monocytes %  


 


Eosinophils %  


 


Basophils %  


 


Nucleated RBC %  


 


PT with INR  11.90 


 


INR  1.05 


 


PTT (Actin FS)  27.8 


 


Sodium   125 L


 


Potassium   3.8


 


Chloride   89 L


 


Carbon Dioxide   28


 


Anion Gap   8


 


BUN   11


 


Creatinine   0.4 L


 


Creat Clearance w eGFR   > 60


 


Random Glucose   237 H


 


Calcium   8.6


 


Phosphorus   2.5


 


Magnesium   1.9


 


Total Bilirubin   0.8  D


 


AST   19


 


ALT   35


 


Alkaline Phosphatase   92


 


Creatine Kinase  


 


Troponin I  


 


Total Protein   7.4


 


Albumin   3.8


 


Urine Color  


 


Urine Appearance  


 


Urine pH  


 


Ur Specific Gravity  


 


Urine Protein  


 


Urine Glucose (UA)  


 


Urine Ketones  


 


Urine Blood  


 


Urine Nitrite  


 


Urine Bilirubin  


 


Urine Urobilinogen  


 


Ur Leukocyte Esterase  


 


Blood Type  


 


Antibody Screen  














ASSESSMENT AND PLAN:


Hypertensive Urgency


Metastatic Breast Ca 


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





-  For Ommaya shunt placement 


-  BP control 


-  Decadron


-  antiemetics


-  pain control


-  DVT prophylaxis





Dr Ellis 


Critical care time spent in reviewing chart, evaluating patient and formulating 

plan - 36 minutes.

## 2018-05-24 NOTE — PN
Progress Note, Physician


Chief Complaint: 





Head Ct negative


History of Present Illness: 





tele: nsr





- Current Medication List


Current Medications: 


Active Medications





Acetaminophen (Tylenol -)  650 mg PO Q4H PRN


   PRN Reason: HEADACHE


   Last Admin: 05/23/18 20:08 Dose:  650 mg


Acetaminophen (Ofirmev Injection -)  1,000 mg IVPB Q6H PRN


   PRN Reason: HEADACHE


   Last Admin: 05/24/18 07:55 Dose:  1,000 mg


Chlorhexidine Gluconate (Hibiclens For Decolonization -)  1 applic TP HS [facility]


   Last Admin: 05/23/18 23:48 Dose:  1 applic


Dexamethasone Sodium Phosphate (Decadron Injection -)  4 mg IVPUSH Q6H [facility]


   Last Admin: 05/24/18 05:57 Dose:  4 mg


Fentanyl (Duragesic 12mcg Patch -)  1 patch TD Q72H [facility]


   Stop: 05/29/18 09:14


Fentanyl (Sublimaze Injection -)  50 mcg IVPUSH Q3H PRN


   PRN Reason: PAIN LEVEL 7 - 10


   Stop: 05/25/18 04:59


   Last Admin: 05/24/18 06:18 Dose:  50 mcg


Hydralazine HCl (Apresoline Injection -)  10 mg IVPUSH ONCE ONE


   Stop: 05/24/18 07:37


Sodium Chloride (Normal Saline -)  1,000 mls @ 50 mls/hr IV ASDIR [facility]


   Stop: 05/25/18 07:54


Magnesium Oxide (Mag-Ox -)  400 mg PO BID [facility]


   Last Admin: 05/23/18 23:48 Dose:  400 mg


Miscellaneous (Duragesic Patch Waste)  1 each TD PRN PRN


   PRN Reason: PAIN


Mupirocin (Bactroban Ointment (For Decolonization) -)  1 applic NS BID [facility]


   Stop: 05/28/18 09:59


   Last Admin: 05/23/18 23:48 Dose:  1 applic


Nystatin (Nystatin Oral Suspension -)  500,000 units PO Q6HPO [facility]


   Last Admin: 05/24/18 05:57 Dose:  500,000 units


Ondansetron HCl (Zofran Injection)  8 mg IVPB Q8H PRN


   PRN Reason: NAUSEA


   Last Admin: 05/23/18 20:10 Dose:  8 mg


Oxycodone HCl (Roxicodone -)  5 mg PO Q4H PRN


   PRN Reason: PAIN 4-6


Oxycodone HCl (Roxicodone -)  10 mg PO Q4H PRN


   PRN Reason: PAIN LEVEL 6-10


   Last Admin: 05/23/18 20:09 Dose:  10 mg


Pantoprazole Sodium (Protonix -)  40 mg PO DAILY ANTONIO


Prochlorperazine Edisylate (Compazine Injection -)  10 mg IVPB Q4H PRN


   PRN Reason: NAUSEA AND/OR VOMITING


Prochlorperazine Maleate (Compazine -)  10 mg PO Q4H PRN


   PRN Reason: NAUSEA AND/OR VOMITING











- Objective


Vital Signs: 


 Vital Signs











Temperature  98.5 F   05/24/18 06:25


 


Pulse Rate  61   05/24/18 06:25


 


Respiratory Rate  17   05/24/18 06:25


 


Blood Pressure  154/73   05/24/18 06:25


 


O2 Sat by Pulse Oximetry (%)  98   05/23/18 21:00











Constitutional: Yes: No Distress


Cardiovascular: Yes: Regular Rate and Rhythm


Respiratory: Yes: CTA Bilaterally


Gastrointestinal: Yes: Soft


Edema: No


Neurological: Yes: Confusion


Labs: 


 CBC, BMP





 05/24/18 05:25 





 05/24/18 05:25 





 INR, PTT











INR  1.05  (0.82-1.09)   05/24/18  05:25    














- ....Imaging


EKG: Image Reviewed





Assessment/Plan








IMP:


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


HTN





REC:


Add amlodipine 5mg daily.


Onc/Neuro following.

## 2018-05-24 NOTE — SURG
Surgery First Assist Note


First Assist: Chiquita Jeter PA-C


Date of Service: 05/24/18


Diagnosis: 





Carcinomatous meningitis





Procedure: 





Operation: R frontal Ommaya reservoir placement; R frontal ventricular catheter

; microdissection





Visit type





- Case Type


Case Type: ED Admission





- Emergency


Emergency Visit: Yes


ED Registration Date: 05/17/18


Care time: The patient presented to the Emergency Department on the above date 

and was hospitalized for further evaluation of their emergent condition.





- New patient


This patient is new to me today: Yes


Date on this admission: 05/24/18

## 2018-05-24 NOTE — OP
Operative Note





- Note:


Operative Date: 05/24/18


Pre-Operative Diagnosis: Carcinomatous meningitis


Operation: R frontal Ommaya reservoir placement; R frontal ventricular catheter

; microdissection


Findings: 





clear CSF 


Implants: Ommaya reservoir and ventricular catheter


Post-Operative Diagnosis: Same as Pre-op


Surgeon: Gabriel Verde


Assistant: Chiquita Jeter


Anesthesiologist/CRNA: Ash Santillan


Anesthesia: General


Specimens Removed: CSF for gram stain, culture, cytology


Estimated Blood Loss (mls): 25

## 2018-05-24 NOTE — PN
Progress Note (short form)





- Note


Progress Note: 


Pt seen and examined





chart reviewed. 





unable to obtain ROS


 


HEENT: closing eyes


Breasts: extensive erythema, nodularity chest wall right breast


Cor: RSR, No murmurs, No gallops


Lungs: Clear to P&A


Abd: Soft, Normal bowel sounds, No organomegaly


Ext:No significant edema


Skin: No rashes, Integument intact











Temp Pulse Resp BP Pulse Ox


 


 97.4 F L  68   16   138/76   100 


 


 05/24/18 12:50  05/24/18 12:50  05/24/18 12:50  05/24/18 12:50  05/24/18 12:50








 CBC, BMP





 05/24/18 05:25 





 05/24/18 05:25 





 Current Medications











Generic Name Dose Route Start Last Admin





  Trade Name Freq  PRN Reason Stop Dose Admin


 


Acetaminophen  650 mg  05/23/18 10:56  05/23/18 20:08





  Tylenol -  PO   650 mg





  Q4H PRN   Administration





  HEADACHE   





     





     





     


 


Acetaminophen  1,000 mg  05/23/18 23:18  05/24/18 07:55





  Ofirmev Injection -  IVPB   1,000 mg





  Q6H PRN   Administration





  HEADACHE   





     





     





     


 


Acetaminophen/Codeine Phosphate  1 tab  05/24/18 12:04  





  Tylenol # 3 -  PO   





  Q4H PRN   





  PAIN LEVEL 4 - 6   





     





     





     


 


Amlodipine Besylate  5 mg  05/24/18 10:00  05/24/18 09:26





  Norvasc -  PO   Not Given





  DAILY ANTONIO   





     





     





     





     


 


Chlorhexidine Gluconate  1 applic  05/23/18 22:00  05/23/18 23:48





  Hibiclens For Decolonization -  TP   1 applic





  HS ANTONIO   Administration





     





     





     





     


 


Dexamethasone Sodium Phosphate  4 mg  05/23/18 12:30  05/24/18 05:57





  Decadron Injection -  IVPUSH   4 mg





  Q6H ANTONIO   Administration





     





     





     





     


 


Docusate Sodium  100 mg  05/24/18 14:00  





  Colace -  PO   





  TID ANTONIO   





     





     





     





     


 


Fentanyl  1 patch  05/25/18 09:15  





  Duragesic 12mcg Patch -  TD  05/29/18 09:14  





  Q72H ANTONIO   





     





     





     





     


 


Fentanyl  50 mcg  05/24/18 04:55  05/24/18 06:18





  Sublimaze Injection -  IVPUSH  05/25/18 04:59  50 mcg





  Q3H PRN   Administration





  PAIN LEVEL 7 - 10   





     





     





     


 


Sodium Chloride  1,000 mls @ 60 mls/hr  05/24/18 09:51  





  Normal Saline -  IV  05/25/18 07:54  





  ASDIR ANTONIO   





     





     





     





     


 


Ceftriaxone Sodium 1 gm/  50 mls @ 100 mls/hr  05/24/18 10:30  





  Dextrose  IVPB   





  DAILY [facility]   





     





     





  Protocol   





     


 


Cefazolin Sodium  50 mls @ 100 mls/hr  05/24/18 18:00  





  Ancef 1 Gm Premixed Ivpb -  IVPB  05/25/18 17:59  





  Q8H-IV ANTONIO   





     





     





     





     


 


Dextrose/Sodium Chloride  1,000 mls @ 80 mls/hr  05/24/18 12:15  





  Dextrose 5%-Normal Saline+20 Meq Kcl -  IV   





  ASDIR ANTONIO   





     





     





     





     


 


Magnesium Oxide  400 mg  05/23/18 22:00  05/24/18 09:26





  Mag-Ox -  PO   Not Given





  BID [facility]   





     





     





     





     


 


Miscellaneous  1 each  05/23/18 10:56  





  Duragesic Patch Waste  TD   





  PRN PRN   





  PAIN   





     





     





     


 


Mupirocin  1 applic  05/23/18 10:00  05/24/18 09:27





  Bactroban Ointment (For Decolonization) -  NS  05/28/18 09:59  1 applic





  BID ANTONIO   Administration





     





     





     





     


 


Nystatin  500,000 units  05/23/18 12:00  05/24/18 05:57





  Nystatin Oral Suspension -  PO   500,000 units





  Q6HPO ANTONIO   Administration





     





     





     





     


 


Ondansetron HCl  8 mg  05/23/18 14:46  05/24/18 08:36





  Zofran Injection  IVPB   8 mg





  Q8H PRN   Administration





  NAUSEA   





     





     





     


 


Oxycodone HCl  5 mg  05/23/18 10:56  





  Roxicodone -  PO   





  Q4H PRN   





  PAIN 4-6   





     





     





     


 


Oxycodone HCl  10 mg  05/23/18 10:56  05/23/18 20:09





  Roxicodone -  PO   10 mg





  Q4H PRN   Administration





  PAIN LEVEL 6-10   





     





     





     


 


Prochlorperazine Edisylate  10 mg  05/23/18 10:56  





  Compazine Injection -  IVPB   





  Q4H PRN   





  NAUSEA AND/OR VOMITING   





     





     





     


 


Prochlorperazine Maleate  10 mg  05/23/18 10:56  





  Compazine -  PO   





  Q4H PRN   





  NAUSEA AND/OR VOMITING   





     





     





     








advanced TNBC. 


LMD


HTN 





s/p Ommaya


s/p IT MTX 12mg (to be given twice a week)


c/w dex

## 2018-05-24 NOTE — PN
Progress Note (short form)





- Note


Progress Note: 





NEUROSURGERY 





POST-OP





In ICU


Minimal H/A


PE: AF, VSS


Still sleepy 


HEENT- NC/AT; Neck- supple; Cor- RRR; Lungs- CTA B;; Abd- benign; Ext- no sign 

of DVT


CN- intact except mild L CN VI palsy on lateral gaze; Motor- 4 B UE/LE; 

Sensation- intact LT/vibration; DTR- hyporeflexic; Cerebellar- intact FTN B; 

Gait- stable


CT- catheter in good position; small amount of air as expected


Stage IV triple negative breast CA 


Findings d/w pt and 


Prophylactic iv abx

## 2018-05-24 NOTE — PN
Physical Exam: 


SUBJECTIVE: Patient awake, c/o HA and general discomfort.   @ bedside.








OBJECTIVE:


GENERAL: Awake, drowsy


CV: S1/S2


RESPIRATORY: Decreased breath sounds B/L


ABDOMEN: Soft, no TTP, (+) bowel sounds


EXTREMITY: 2+ DP pulses, no edema, cords















































 Vital Signs











 Period  Temp  Pulse  Resp  BP Sys/Zhao  Pulse Ox


 


 Last 24 Hr  97.4 F-98.5 F  51-88  15-21  133-180/70-79  

















 Laboratory Results - last 24 hr











  05/23/18 05/23/18 05/24/18





  14:50 18:37 05:25


 


WBC    20.9 H D


 


RBC    3.80


 


Hgb    13.4  D


 


Hct    40.1  D


 


MCV    105.7 H


 


MCH    35.2 H


 


MCHC    33.3


 


RDW    18.5 H


 


Plt Count    259


 


MPV    7.4 L


 


Neutrophils %    83.8 H


 


Neutrophils % (Manual)    79.6


 


Band Neutrophils %    1.0


 


Lymphocytes %    5.7 L D


 


Lymphocytes % (Manual)    4.1 L


 


Monocytes %    10.0


 


Monocytes % (Manual)    14 H


 


Eosinophils %    0.0


 


Eosinophils % (Manual)    0.0


 


Basophils %    0.5  D


 


Basophils % (Manual)    0.0


 


Myelocytes % (Man)    1  D


 


Promyelocytes % (Man)    0


 


Blast Cells % (Manual)    0


 


Nucleated RBC %    0


 


Metamyelocytes    0


 


Platelet Estimate    Increased


 


Polychromasia    1+


 


Macrocytosis    1+


 


Ovalocytes    1+


 


PT with INR   


 


INR   


 


PTT (Actin FS)  27.3  D  


 


Sodium   


 


Potassium   


 


Chloride   


 


Carbon Dioxide   


 


Anion Gap   


 


BUN   


 


Creatinine   


 


Creat Clearance w eGFR   


 


Random Glucose   


 


Calcium   


 


Phosphorus   


 


Magnesium   


 


Total Bilirubin   


 


AST   


 


ALT   


 


Alkaline Phosphatase   


 


Total Protein   


 


Albumin   


 


Blood Type   O POSITIVE 


 


Antibody Screen   Negative 














  05/24/18 05/24/18 05/24/18





  05:25 05:25 13:30


 


WBC   


 


RBC   


 


Hgb   


 


Hct   


 


MCV   


 


MCH   


 


MCHC   


 


RDW   


 


Plt Count   


 


MPV   


 


Neutrophils %   


 


Neutrophils % (Manual)   


 


Band Neutrophils %   


 


Lymphocytes %   


 


Lymphocytes % (Manual)   


 


Monocytes %   


 


Monocytes % (Manual)   


 


Eosinophils %   


 


Eosinophils % (Manual)   


 


Basophils %   


 


Basophils % (Manual)   


 


Myelocytes % (Man)   


 


Promyelocytes % (Man)   


 


Blast Cells % (Manual)   


 


Nucleated RBC %   


 


Metamyelocytes   


 


Platelet Estimate   


 


Polychromasia   


 


Macrocytosis   


 


Ovalocytes   


 


PT with INR  11.90  


 


INR  1.05  


 


PTT (Actin FS)  27.8  


 


Sodium   125 L  128 L


 


Potassium   3.8  3.7


 


Chloride   89 L  92 L


 


Carbon Dioxide   28  26


 


Anion Gap   8  10


 


BUN   11  12


 


Creatinine   0.4 L  0.6


 


Creat Clearance w eGFR   > 60 


 


Random Glucose   237 H  165 H


 


Calcium   8.6  8.0 L


 


Phosphorus   2.5 


 


Magnesium   1.9 


 


Total Bilirubin   0.8  D 


 


AST   19 


 


ALT   35 


 


Alkaline Phosphatase   92 


 


Total Protein   7.4 


 


Albumin   3.8 


 


Blood Type   


 


Antibody Screen   








Active Medications











Generic Name Dose Route Start Last Admin





  Trade Name Freq  PRN Reason Stop Dose Admin


 


Acetaminophen  650 mg  05/23/18 10:56  05/23/18 20:08





  Tylenol -  PO   650 mg





  Q4H PRN   Administration





  HEADACHE   





     





     





     


 


Acetaminophen  1,000 mg  05/23/18 23:18  05/24/18 07:55





  Ofirmev Injection -  IVPB   1,000 mg





  Q6H PRN   Administration





  HEADACHE   





     





     





     


 


Acetaminophen/Codeine Phosphate  1 tab  05/24/18 12:04  





  Tylenol # 3 -  PO   





  Q4H PRN   





  PAIN LEVEL 4 - 6   





     





     





     


 


Amlodipine Besylate  5 mg  05/24/18 10:00  05/24/18 09:26





  Norvasc -  PO   Not Given





  DAILY ANTONIO   





     





     





     





     


 


Chlorhexidine Gluconate  1 applic  05/23/18 22:00  05/23/18 23:48





  Hibiclens For Decolonization -  TP   1 applic





  HS ANTONIO   Administration





     





     





     





     


 


Dexamethasone Sodium Phosphate  4 mg  05/23/18 12:30  05/24/18 13:21





  Decadron Injection -  IVPUSH   4 mg





  Q6H ANTONIO   Administration





     





     





     





     


 


Docusate Sodium  100 mg  05/24/18 14:00  





  Colace -  PO   





  TID ANTONIO   





     





     





     





     


 


Fentanyl  1 patch  05/25/18 09:15  





  Duragesic 12mcg Patch -  TD  05/29/18 09:14  





  Q72H ANTONIO   





     





     





     





     


 


Fentanyl  50 mcg  05/24/18 04:55  05/24/18 06:18





  Sublimaze Injection -  IVPUSH  05/25/18 04:59  50 mcg





  Q3H PRN   Administration





  PAIN LEVEL 7 - 10   





     





     





     


 


Sodium Chloride  1,000 mls @ 60 mls/hr  05/24/18 09:51  05/24/18 12:56





  Normal Saline -  IV  05/25/18 07:54  60 mls/hr





  ASDIR ANTONIO   Administration





     





     





     





     


 


Cefazolin Sodium 1 gm/  50 mls @ 100 mls/hr  05/24/18 18:00  





  Dextrose  IVPB  05/25/18 17:59  





  Q8H-IV ANTONIO   





     





     





     





     


 


Dextrose/Sodium Chloride  20 meq in 1,000 mls @ 80 mls/hr  05/24/18 12:15  





  Dextrose 5%-Normal Saline+20 Meq Kcl -  IV   





  ASDIR [facility]   





     





     





     





     


 


Ceftriaxone Sodium 1 gm/  50 mls @ 100 mls/hr  05/25/18 10:00  





  Dextrose  IVPB   





  DAILY [facility]   





     





     





  Protocol   





     


 


Magnesium Oxide  400 mg  05/23/18 22:00  05/24/18 09:26





  Mag-Ox -  PO   Not Given





  BID [facility]   





     





     





     





     


 


Miscellaneous  1 each  05/23/18 10:56  





  Duragesic Patch Waste  TD   





  PRN PRN   





  PAIN   





     





     





     


 


Mupirocin  1 applic  05/23/18 10:00  05/24/18 09:27





  Bactroban Ointment (For Decolonization) -  NS  05/28/18 09:59  1 applic





  BID [facility]   Administration





     





     





     





     


 


Nystatin  500,000 units  05/23/18 12:00  05/24/18 12:57





  Nystatin Oral Suspension -  PO   500,000 units





  Q6HPO ANTONIO   Administration





     





     





     





     


 


Ondansetron HCl  8 mg  05/23/18 14:46  05/24/18 08:36





  Zofran Injection  IVPB   8 mg





  Q8H PRN   Administration





  NAUSEA   





     





     





     


 


Oxycodone HCl  5 mg  05/23/18 10:56  





  Roxicodone -  PO   





  Q4H PRN   





  PAIN 4-6   





     





     





     


 


Oxycodone HCl  10 mg  05/23/18 10:56  05/23/18 20:09





  Roxicodone -  PO   10 mg





  Q4H PRN   Administration





  PAIN LEVEL 6-10   





     





     





     


 


Prochlorperazine Edisylate  10 mg  05/23/18 10:56  





  Compazine Injection -  IVPB   





  Q4H PRN   





  NAUSEA AND/OR VOMITING   





     





     





     


 


Prochlorperazine Maleate  10 mg  05/23/18 10:56  





  Compazine -  PO   





  Q4H PRN   





  NAUSEA AND/OR VOMITING   





     





     





     











ASSESSMENT/PLAN:


67 year old female with Stage IV Breast CA, POD #0 for R frontal Ommaya 

reservoir placement with R ventricular catheter.  At presentation patient was 

in Hypertensive urgency ('s), now resolved. 








1. POD #0 for R FRONTAL OMMAYA RESERVOIR PLACEMENT


- Thecal Chemotherapy administered


- Steroids


- Pain control with Roxicodone, Fentanyl


- Close BP, Respiratory monitoring


- Appreciate heme/onc, neurosurgery reccs





2. HYPERTENSIVE URGENCY RESOLVED


- At presentation 's/80's


- Current BP range: 140's-160's/70's


- Response w/ Norvasc


- Head CT negative


- Goal SBP, 160


- Continue to monitor





PROPHYLAXIS


SCDs, Heparin withhoeld being pending surgery


Protonix




















Visit type





- Emergency Visit


Emergency Visit: No





- New Patient


This patient is new to me today: Yes


Date on this admission: 05/25/18





- Critical Care


Critical Care patient: No

## 2018-05-24 NOTE — PN
Progress Note, Physician


Chief Complaint: 





zhao was inserted with 1100 cc 





- Current Medication List


Current Medications: 


Active Medications





Acetaminophen (Tylenol -)  650 mg PO Q4H PRN


   PRN Reason: HEADACHE


   Last Admin: 05/23/18 20:08 Dose:  650 mg


Acetaminophen (Ofirmev Injection -)  1,000 mg IVPB Q6H PRN


   PRN Reason: HEADACHE


   Last Admin: 05/24/18 07:55 Dose:  1,000 mg


Acetaminophen/Codeine Phosphate (Tylenol # 3 -)  1 tab PO Q4H PRN


   PRN Reason: PAIN LEVEL 4 - 6


Amlodipine Besylate (Norvasc -)  5 mg PO DAILY [facility]


   Last Admin: 05/24/18 09:26 Dose:  Not Given


Chlorhexidine Gluconate (Hibiclens For Decolonization -)  1 applic TP HS [facility]


   Last Admin: 05/23/18 23:48 Dose:  1 applic


Dexamethasone Sodium Phosphate (Decadron Injection -)  4 mg IVPUSH Q6H [facility]


   Last Admin: 05/24/18 17:51 Dose:  4 mg


Docusate Sodium (Colace -)  100 mg PO TID [facility]


   Last Admin: 05/24/18 14:00 Dose:  Not Given


Fentanyl (Duragesic 12mcg Patch -)  1 patch TD Q72H [facility]


   Stop: 05/29/18 09:14


Fentanyl (Sublimaze Injection -)  50 mcg IVPUSH Q3H PRN


   PRN Reason: PAIN LEVEL 7 - 10


   Stop: 05/25/18 04:59


   Last Admin: 05/24/18 06:18 Dose:  50 mcg


Sodium Chloride (Normal Saline -)  1,000 mls @ 60 mls/hr IV ASDIR [facility]


   Stop: 05/25/18 07:54


   Last Admin: 05/24/18 12:56 Dose:  60 mls/hr


Cefazolin Sodium 1 gm/ (Dextrose)  50 mls @ 100 mls/hr IVPB Q8H-IV [facility]


   Stop: 05/25/18 17:59


   Last Admin: 05/24/18 17:20 Dose:  100 mls/hr


Lorazepam (Ativan Injection -)  1 mg IVPUSH Q6H PRN


   PRN Reason: ANXIETY


   Last Admin: 05/24/18 17:20 Dose:  1 mg


Magnesium Oxide (Mag-Ox -)  400 mg PO BID [facility]


   Last Admin: 05/24/18 09:26 Dose:  Not Given


Miscellaneous (Duragesic Patch Waste)  1 each TD PRN PRN


   PRN Reason: PAIN


Mupirocin (Bactroban Ointment (For Decolonization) -)  1 applic NS BID [facility]


   Stop: 05/28/18 09:59


   Last Admin: 05/24/18 09:27 Dose:  1 applic


Nystatin (Nystatin Oral Suspension -)  500,000 units PO Q6HPO [facility]


   Last Admin: 05/24/18 17:21 Dose:  Not Given


Ondansetron HCl (Zofran Injection)  8 mg IVPB Q8H PRN


   PRN Reason: NAUSEA


   Last Admin: 05/24/18 08:36 Dose:  8 mg


Oxycodone HCl (Roxicodone -)  5 mg PO Q4H PRN


   PRN Reason: PAIN 4-6


Oxycodone HCl (Roxicodone -)  10 mg PO Q4H PRN


   PRN Reason: PAIN LEVEL 6-10


   Last Admin: 05/23/18 20:09 Dose:  10 mg


Prochlorperazine Edisylate (Compazine Injection -)  10 mg IVPB Q4H PRN


   PRN Reason: NAUSEA AND/OR VOMITING


Prochlorperazine Maleate (Compazine -)  10 mg PO Q4H PRN


   PRN Reason: NAUSEA AND/OR VOMITING











- Objective


Vital Signs: 


 Vital Signs











Temperature  98.0 F   05/24/18 21:00


 


Pulse Rate  83   05/24/18 21:00


 


Respiratory Rate  18   05/24/18 21:00


 


Blood Pressure  153/85   05/24/18 21:00


 


O2 Sat by Pulse Oximetry (%)  100   05/24/18 20:57











Labs: 


 CBC, BMP





 05/24/18 05:25 





 05/24/18 17:30 





 INR, PTT











INR  1.05  (0.82-1.09)   05/24/18  05:25

## 2018-05-24 NOTE — CON.ID
Consult


Consult Specialty:: infectious diseases


Reason for Consultation:: post op,uti





- History of Present Illness


History of Present Illness: 








 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 

bone (s/p genzar and carboplatin) diagnosed 2017 now on taxol, mammary 

carcinoma of lobular phenotype and LCIS with lymphovascular invasion , IBD, GERD

, RLE thrombophlebitis, and osteoasthritis was having headaches  with  nausea, 

vertigo and diarrhea/indigestion.. 


patient was having blurry vision specifically double to the left eye, right is 

fine. HA also worsens when she lays on her left side. 


She did have R nathaniel surgery with opthomology Dr. Carcamo who left her left eye 

for reading and right for distance, but in the last week her vision and reading 

capability have diminished with both eyes open. 


Nausea treated with zofran or compazine. 


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


I have read the previous records of the patient 


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 

neurosurgery and patient underwent R frontal Ommaya reservoir placement; R 

frontal ventricular catheter; microdissection


currently patient is post op and a bit lethargic though awake and alert


patient has been worked up and found to have uti with two organism and i was 

called to consult on the patient


patient has been afebrile but is very restless





- History Source


History Provided By: Family Member


Limitations to Obtaining History: Clinical Condition





- Past Medical History


Gastrointestinal: Yes: GERD, Irritable Bowel Disease


Musculoskeletal: Yes: Osteoarthritis


Additional Medical History: H/O thrombophlebitis right LE





- Alcohol/Substance Use


Hx Alcohol Use: No


History of Substance Use: reports: None





- Smoking History


Smoking history: Never smoked


Have you smoked in the past 12 months: No





- Social History


ADL: Independent


Occupation: [employer] nurse 


History of Recent Travel: No





Home Medications





- Allergies


Allergies/Adverse Reactions: 


 Allergies











Allergy/AdvReac Type Severity Reaction Status Date / Time


 


adhesive tape Allergy  Rash Verified 18 10:23


 


No Known Drug Allergies Allergy   Verified 18 10:23














- Home Medications


Home Medications: 


Ambulatory Orders





Multivitamins [Tab-A-Vit -] 1 tab PO DAILY 17 


Ranitidine [Zantac -] 150 mg PO BID 17 


Vitamin C 500 mg PO DAILY 17 


Vitamin D - 1,000 unit PO DAILY 17 


Oxycodone HCl/Acetaminophen [Endocet 5-325 Tablet] 1 each PO Q8H PRN MDD 3  


Dexamethasone [Decadron] 4 mg PO TID 18 


Inderal - 40 mg PO DAILY 18 


Magnesium Oxide 500 mg PO BID 18 











Family Disease History





- Family Disease History


Family Disease History: CA: Grandparent (mat GM breast ca 50  57), 

Father (panceratic ca 82), Mother (CRC 46  57), Sister (mutiple meyloma)





Review of Systems


Unable to obtain ROS, reason: unable





Physical Exam


Vital Signs: 


 Vital Signs











Temperature  97.4 F L  18 16:00


 


Pulse Rate  96 H  18 16:00


 


Respiratory Rate  16   18 16:00


 


Blood Pressure  132/73   18 16:00


 


O2 Sat by Pulse Oximetry (%)  100   18 12:50











Constitutional: Yes: Well Nourished, Mild Distress


Eyes: Yes: Conjunctiva Clear


Cardiovascular: Yes: Regular Rate and Rhythm


Respiratory: Yes: Regular, Poor Air Entry (bases)


Gastrointestinal: Yes: Normal Bowel Sounds, Soft


Musculoskeletal: Yes: WNL


Extremities: Yes: WNL


Wound/Incision: Yes: Dressing Dry and Intact


Neurological: Yes: Alert, Other


Psychiatric: Yes: Other


Labs: 


 CBC, BMP





 18 05:25 





 18 13:30 











Imaging





- Results


Cat Scan: Report Reviewed, Image Reviewed





Assessment/Plan





patient post op now 


continues to be a bit confused  and restless


patient has two organism in the urine patient is asymptomatic but now also is 

post op


currently on cefazolin








Metastatic Breast Ca 


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





plan





post op from Ommaya shunt placement 


monitor closely


await for cx results


rest continue as per icu


monitor bp and mental status


contiue cefazolin for now


might have to give it for few days





cc time 40 min

## 2018-05-24 NOTE — OP
DATE OF OPERATION:  05/24/2018 

 

PREOPERATIVE DIAGNOSIS:  Metastatic breast cancer with carcinomatous meningitis.

 

POSTOPERATIVE DIAGNOSIS:  Metastatic breast cancer with carcinomatous 
meningitis.

 

ATTENDING SURGEON:  Gabriel Verde MD 

 

ASSISTANT:  TERESO Bahena 

 

PROCEDURE:  

1.  Placement of right frontal ventricular zoe hole for ventricular reservoir

placement (97123).

2.  Microsurgical dissection with the operative microscope with microsurgical

technique (36563).  

 

ANESTHESIA:  General endotracheal.  

 

ANESTHESIOLOGIST:  Dr. Ash Santillan

 

ESTIMATED BLOOD LOSS:  25 mL  

 

FINDINGS:  Clear CSF under somewhat elevated pressure.

 

INDICATION:  The patient is a 67-year-old female with a history of increasing

headache.  MRI demonstrated leptomeningeal enhancement.  CSF pathology from 
lumbar

puncture demonstrated probable malignant disease.  She is now consented for 
right

frontal Ommaya reservoir placement for intrathecal methotrexate treatment.  
Risks of

the procedure include but are not limited to bleeding, infection, stroke, 
seizure,

coma, death, reservoir malfunction requiring revision, and other risks of 
general

anesthesia.  The patient understands the indications for the procedure, 
procedure in

detail, risks and benefits and alternative treatments for her condition and 
wished to

proceed.  No guarantees given for a favorable outcome.  

 

PROCEDURE IN DETAIL:  After patient was taken to the operating room, she was 
placed

in supine position.  After general anesthesia was induced and appropriate lines 
were

placed, head was secured in a Garcia horseshoe in a neutral position.  Right

frontal region was clipped and then cleaned with alcohol and prepped with 
Betadine. 

After patient was sterilely prepped and draped, a curvilinear incision was 
marked

with the posterior margin approximately 9.5 cm behind the nasion  and 3.5 cm to 
right

of midline.  After the incision was infiltrated with 5 mL of 1% Xylocaine with

epinephrine, the scalp incision was opened with No. 10 blade.  Hemostasis was

obtained with bipolar electrocautery.  A self-retaining retractor was inserted.
  The

position of the zoe hole was once again measured and verified.  A small zoe 
hole

was made with high-speed pneumatic drill.  The skull was found to be fairly 
thick. 

Epidural hemostasis was obtained with bipolar electrocautery and thrombin-soaked

powdered Gelfoam.  At this point a microscope was brought in after being 
sterilely

draped.  The microscope was used for both illumination and magnification. 

Microsurgical techniques were utilized.  The dura was opened with No. 11 blade 
in a

cruciate fashion.  A 25-cm ventricular catheter was inserted towards the left 
frontal hole

in the lateral ventricle.  The catheter was advanced to approximately 6 cm from 
the

outer table of the skull.  Clear CSF was obtained and it was under relatively 
high

pressure.  CSF sample was sent for both Gram stain and culture as well as 
cytology. 

The right angle stabilizer was secured with a small wire passer hose used to 
secure

the connector.  The catheter was then trimmed and connected to the reservoir 
which

has been filled with plain saline.  The reservoir was secured with 2-0 silk 
sutures

to the tubing.  It was placed in the anterior pocket.  The reservoir was tested 
by

gently aspirating CSF from the reservoir over the scalp.  At this point wound 
was irrigated

with antibiotic-containing irrigation and galea was closed with 3-0 Vicryl 
suture. 

Skin was closed with 3-0 nylon interrupted suture.  A sterile head wrap was 
applied. 

The patient tolerated the procedure well and was extubated in the operating 
room and

was moving bilateral upper and lower extremities in the ICU.  

 

The patient during the procedure received 1 dose of 1 g of Ancef prior to the

incision.  All needle and lap counts were correct.  The OR timeout procedure was

followed.  The family was updated as to the intraoperative findings.  

 

SPECIMENS:  CSF for cytology as well as Gram stain, culture.  

 

The wound is clean.  

 

 

GABRIEL VERDE M.D.

 

[id number]

DD: 05/24/2018 14:17

DT: 05/24/2018 14:57

Job #:  [id number]

MTDD

## 2018-05-25 LAB
ANION GAP SERPL CALC-SCNC: 8 MMOL/L (ref 8–16)
BUN SERPL-MCNC: 11 MG/DL (ref 7–18)
CALCIUM SERPL-MCNC: 8 MG/DL (ref 8.5–10.1)
CHLORIDE SERPL-SCNC: 91 MMOL/L (ref 98–107)
CO2 SERPL-SCNC: 28 MMOL/L (ref 21–32)
CREAT SERPL-MCNC: 0.4 MG/DL (ref 0.55–1.02)
DEPRECATED RDW RBC AUTO: 18.6 % (ref 11.6–15.6)
GLUCOSE SERPL-MCNC: 156 MG/DL (ref 74–106)
HCT VFR BLD CALC: 37.7 % (ref 32.4–45.2)
HGB BLD-MCNC: 12.8 GM/DL (ref 10.7–15.3)
MCH RBC QN AUTO: 35.5 PG (ref 25.7–33.7)
MCHC RBC AUTO-ENTMCNC: 33.9 G/DL (ref 32–36)
MCV RBC: 104.6 FL (ref 80–96)
PLATELET # BLD AUTO: 202 K/MM3 (ref 134–434)
PMV BLD: 7.1 FL (ref 7.5–11.1)
POTASSIUM SERPLBLD-SCNC: 3.6 MMOL/L (ref 3.5–5.1)
RBC # BLD AUTO: 3.6 M/MM3 (ref 3.6–5.2)
SODIUM SERPL-SCNC: 127 MMOL/L (ref 136–145)
WBC # BLD AUTO: 19.4 K/MM3 (ref 4–10)

## 2018-05-25 RX ADMIN — AMLODIPINE BESYLATE SCH MG: 5 TABLET ORAL at 09:19

## 2018-05-25 RX ADMIN — NYSTATIN SCH: 100000 SUSPENSION ORAL at 06:15

## 2018-05-25 RX ADMIN — CEFAZOLIN SCH MLS/HR: 1 INJECTION, POWDER, FOR SOLUTION INTRAVENOUS at 00:59

## 2018-05-25 RX ADMIN — MAGNESIUM OXIDE TAB 400 MG (241.3 MG ELEMENTAL MG) SCH MG: 400 (241.3 MG) TAB at 09:20

## 2018-05-25 RX ADMIN — MUPIROCIN SCH APPLIC: 20 OINTMENT TOPICAL at 19:17

## 2018-05-25 RX ADMIN — DEXAMETHASONE SODIUM PHOSPHATE SCH MG: 4 INJECTION, SOLUTION INTRAMUSCULAR; INTRAVENOUS at 23:44

## 2018-05-25 RX ADMIN — CEFAZOLIN SCH MLS/HR: 1 INJECTION, POWDER, FOR SOLUTION INTRAVENOUS at 09:19

## 2018-05-25 RX ADMIN — CHLORHEXIDINE GLUCONATE SCH APPLIC: 213 SOLUTION TOPICAL at 21:25

## 2018-05-25 RX ADMIN — ACETAMINOPHEN PRN MG: 10 INJECTION, SOLUTION INTRAVENOUS at 01:09

## 2018-05-25 RX ADMIN — DOCUSATE SODIUM SCH: 100 CAPSULE, LIQUID FILLED ORAL at 21:22

## 2018-05-25 RX ADMIN — DOCUSATE SODIUM SCH: 100 CAPSULE, LIQUID FILLED ORAL at 06:15

## 2018-05-25 RX ADMIN — DOCUSATE SODIUM SCH: 100 CAPSULE, LIQUID FILLED ORAL at 14:37

## 2018-05-25 RX ADMIN — DEXAMETHASONE SODIUM PHOSPHATE SCH MG: 4 INJECTION, SOLUTION INTRAMUSCULAR; INTRAVENOUS at 12:20

## 2018-05-25 RX ADMIN — MUPIROCIN SCH APPLIC: 20 OINTMENT TOPICAL at 21:26

## 2018-05-25 RX ADMIN — NYSTATIN SCH UNITS: 100000 SUSPENSION ORAL at 23:13

## 2018-05-25 RX ADMIN — DEXAMETHASONE SODIUM PHOSPHATE SCH MG: 4 INJECTION, SOLUTION INTRAMUSCULAR; INTRAVENOUS at 06:18

## 2018-05-25 RX ADMIN — DEXAMETHASONE SODIUM PHOSPHATE SCH MG: 4 INJECTION, SOLUTION INTRAMUSCULAR; INTRAVENOUS at 19:17

## 2018-05-25 RX ADMIN — NYSTATIN SCH UNITS: 100000 SUSPENSION ORAL at 12:21

## 2018-05-25 RX ADMIN — ACETAMINOPHEN PRN MG: 10 INJECTION, SOLUTION INTRAVENOUS at 22:43

## 2018-05-25 RX ADMIN — MAGNESIUM OXIDE TAB 400 MG (241.3 MG ELEMENTAL MG) SCH: 400 (241.3 MG) TAB at 21:22

## 2018-05-25 RX ADMIN — NYSTATIN SCH UNITS: 100000 SUSPENSION ORAL at 19:17

## 2018-05-25 NOTE — PN
Progress Note, Physician


History of Present Illness: 





patient very lethargic


minimally responsive


no fevers





- Current Medication List


Current Medications: 


Active Medications





Acetaminophen (Ofirmev Injection -)  1,000 mg IVPB Q6H PRN


   PRN Reason: HEADACHE


   Last Admin: 05/25/18 01:09 Dose:  1,000 mg


Acetaminophen/Codeine Phosphate (Tylenol # 3 -)  1 tab PO Q4H PRN


   PRN Reason: PAIN LEVEL 4 - 6


Amlodipine Besylate (Norvasc -)  5 mg PO DAILY [facility]


   Last Admin: 05/25/18 09:19 Dose:  5 mg


Chlorhexidine Gluconate (Hibiclens For Decolonization -)  1 applic TP HS [facility]


   Last Admin: 05/24/18 23:17 Dose:  1 applic


Dexamethasone Sodium Phosphate (Decadron Injection -)  4 mg IVPUSH Q6H [facility]


   Last Admin: 05/25/18 12:20 Dose:  4 mg


Docusate Sodium (Colace -)  100 mg PO TID [facility]


   Last Admin: 05/25/18 06:15 Dose:  Not Given


Fentanyl (Duragesic 25mcg Patch -)  1 patch TD Q72H [facility]


   Stop: 05/26/18 11:59


   Last Admin: 05/25/18 12:20 Dose:  1 patch


Hydralazine HCl (Apresoline Injection -)  10 mg IVPUSH Q6H PRN


   PRN Reason: HYPERTENSION


Cefazolin Sodium 1 gm/ (Dextrose)  50 mls @ 100 mls/hr IVPB Q8H-IV [facility]


   Stop: 05/25/18 17:59


   Last Admin: 05/25/18 09:19 Dose:  100 mls/hr


Lorazepam (Ativan Injection -)  1 mg IVPUSH Q6H PRN


   PRN Reason: ANXIETY


   Last Admin: 05/25/18 10:25 Dose:  1 mg


Magnesium Oxide (Mag-Ox -)  400 mg PO BID [facility]


   Last Admin: 05/25/18 09:20 Dose:  400 mg


Miscellaneous (Duragesic Patch Waste)  1 each TD PRN PRN


   PRN Reason: PAIN


Mupirocin (Bactroban Ointment (For Decolonization) -)  1 applic NS BID [facility]


   Stop: 05/28/18 09:59


   Last Admin: 05/24/18 22:17 Dose:  1 applic


Nystatin (Nystatin Oral Suspension -)  500,000 units PO Q6HPO [facility]


   Last Admin: 05/25/18 12:21 Dose:  500,000 units


Ondansetron HCl (Zofran Injection)  8 mg IVPB Q8H PRN


   PRN Reason: NAUSEA


   Last Admin: 05/24/18 08:36 Dose:  8 mg


Oxycodone HCl (Roxicodone -)  5 mg PO Q4H PRN


   PRN Reason: PAIN 4-6


Oxycodone HCl (Roxicodone -)  10 mg PO Q4H PRN


   PRN Reason: PAIN LEVEL 6-10


   Last Admin: 05/23/18 20:09 Dose:  10 mg


Prochlorperazine Edisylate (Compazine Injection -)  10 mg IVPB Q4H PRN


   PRN Reason: NAUSEA AND/OR VOMITING


Prochlorperazine Maleate (Compazine -)  10 mg PO Q4H PRN


   PRN Reason: NAUSEA AND/OR VOMITING











- Objective


Vital Signs: 


 Vital Signs











Temperature  98 F   05/25/18 10:00


 


Pulse Rate  96 H  05/25/18 12:00


 


Respiratory Rate  18   05/25/18 12:00


 


Blood Pressure  158/52   05/25/18 12:00


 


O2 Sat by Pulse Oximetry (%)  98   05/25/18 09:00











Constitutional: Yes: Other


Cardiovascular: Yes: Regular Rate and Rhythm


Respiratory: Yes: Regular, CTA Bilaterally, On Nasal O2


Gastrointestinal: Yes: Normal Bowel Sounds, Soft


Musculoskeletal: Yes: WNL


Extremities: Yes: WNL


Wound/Incision: Yes: Dressing Dry and Intact


Neurological: Yes: Confusion, Other


Psychiatric: Yes: Other


Labs: 


 CBC, BMP





 05/25/18 05:55 





 05/25/18 05:55 





 INR, PTT











INR  1.05  (0.82-1.09)   05/24/18  05:25    














Assessment/Plan





patient post op now 


continues to be a bit confused  and restless


patient has two organism in the urine patient is asymptomatic but now also is 

post op


currently on cefazolin








Metastatic Breast Ca 


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





plan





post op from Ommaya shunt placement 


monitor closely


await for cx results


one organism still pending


rest continue as per icu


monitor bp and mental status


continue cefazolin 


await for second organism





cc time 40 min

## 2018-05-25 NOTE — PN
Progress Note (short form)





- Note


Progress Note: 





NEUROSURGERY 





POD #1





In ICU


Minimal H/A


PE: AF, VSS


Drowsy, minimally verbal


HEENT- NC/AT; Neck- supple; Cor- RRR; Lungs- CTA B;; Abd- benign; Ext- no sign 

of DVT


CN- PERRL; Motor- 3 B UE/LE; Sensation- difficult to assess; DTR- hyporeflexic


Postop CT- catheter in good position; small amount of air as expected


Stage IV triple negative breast CA 


Given first dose of IT MTX yesterday 


Prophylactic iv abx


Prognosis poor

## 2018-05-25 NOTE — CONS
DATE OF CONSULTATION:  2018

 

REFERRING PHYSICIAN:  Tigist Anderson MD

 

REASON FOR CONSULTATION:  Carcinomatous meningitis.

 

HISTORY OF PRESENT ILLNESS:  The patient is a 67-year-old woman known to me 
with a

history of stage IV breast cancer with bone metastases.  She received palliative

radiotherapy with me to the right pelvis and proximal femur as well as the left

shoulder and proximal humerus most recently in December.  She continued systemic

therapy, and unfortunately, has had progression of her disease in the skeleton, 
chest

wall, and now CNS.  She recently presented with headaches, nausea, and 
vomiting.  MRI

of the brain showed leptomeningeal enhancement suggestive of leptomeningeal 
disease. 

She had a lumbar puncture which was non-diagnostic.  A repeat lumbar puncture 
was

planned, but unfortunately, she acutely deteriorated and was transferred to the

intensive care unit.  She had Ommaya placement by Dr. Verde and has commenced

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  
We were

asked to evaluate her.

 

PAST MEDICAL HISTORY:  Prior radiotherapy as noted, GERD, osteoarthritis.

 

PAST SURGICAL HISTORY:  Ommaya reservoir as noted, bilateral keratotomy, left 
hip

replacement, Port-A-Cath.

 

OBSTETRICAL/GYNECOLOGICAL HISTORY:   4, para 4.  Menarche at 14.  Parity 
at

31.  No hormone replacement therapy.

 

ALLERGIES:  No known drug allergies.

 

CURRENT MEDICATIONS:  Decadron 4 mg IV q.6, magnesium oxide, cefazolin, 
Bactroban

ointment, Colace, Norvasc, Duragesic patch, nystatin suspension.

 

SOCIAL HISTORY:  , ICU nurse here.  No tobacco or significant alcohol 
use. 

She has 4 sons.

 

FAMILY HISTORY:  Father  of pancreatic cancer at age 82.  Mother  of 
cancer

at age 57, with lung cancer and colon cancer.  Sister with multiple myeloma,

grandmother with breast cancer.

 

REVIEW OF SYSTEMS:  Unobtainable.

 

PHYSICAL EXAMINATION:

General:  Lethargic, barely arousable.  Opens eyes to verbal stimuli but unable 
to

converse.

Vital Signs:  Temperature 98.1, blood pressure 131/55, pulse 82, respiratory 
rate 20,

SaO2 of 98% on 2 L nasal cannula.

HEENT:  Mucous membranes are dry.  Sclerae are anicteric.

Neck:  No adenopathy.

Lungs:  Diminished breath sounds with poor inspiratory effort.

Chest Wall:  Nodular disease.

Abdomen:  Soft, nondistended.

Neurologic:  Lethargic, nonverbal.

 

Head CT May 24, 2018:  Status post right frontal ventricular shunt.

 

CT chest, abdomen, and pelvis May 17, 2018:  Right hepatic lobe metastatic 
disease,

ampullary soft tissue 2.3 cm, left paraaortic lymph node, multifocal osseous

metastases in the thoracolumbar spine, ribs, and pelvis.

 

PATHOLOGIC DATA:  CSF cytology from lumbar puncture May 18, 2018:  Very rare 
atypical

cells and rare lymphocytes present.

 

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

breast cancer, now with diffuse leptomeningeal disease and poor neurologic 
function. 

She has commenced intrathecal chemotherapy yesterday.  She is on steroids.  On 
review

of the recent scans including the MRI of the brain, there are no large focal 
areas of

disease with the exception of meningeal thickening.  At this time, the patient 
would

not be a candidate for any radiation therapy, but if she improves, we could 
consider

whole-brain radiation which would unlikely significantly improve her clinical 
course

as her prognosis is poor.  I would recommend continuing with intrathecal 
therapy and

monitor for improvement in her neurologic status.  I would not recommend 
radiation

therapy at this time.

 

Thank you for asking me to see her.

 

 

JOSSE BERMUDEZ M.D.

 

[id number]

DD: 2018 16:04

DT: 2018 22:18

Job #:  [id number]

[id number]

## 2018-05-25 NOTE — PN
Progress Note (short form)





- Note


Progress Note: 


S/P omaya reservoir placement.  She is rousable but lethargic.  Dr. Stanford 

covering this weekend.











Problem List





- Problems


(1) Breast cancer metastasized to bone


Code(s): C50.919 - MALIGNANT NEOPLASM OF UNSP SITE OF UNSPECIFIED FEMALE BREAST

; C79.51 - SECONDARY MALIGNANT NEOPLASM OF BONE   


Qualifiers: 


   Laterality: right   Qualified Code(s): C50.911 - Malignant neoplasm of 

unspecified site of right female breast; C79.51 - Secondary malignant neoplasm 

of bone; C79.51 - Secondary malignant neoplasm of bone; C79.51 - Secondary 

malignant neoplasm of bone; C79.51 - Secondary malignant neoplasm of bone   





(2) Headache


Code(s): R51 - HEADACHE   


Qualifiers: 


   Headache type: unspecified   Headache chronicity pattern: unspecified 

pattern   Intractability: intractable   Qualified Code(s): R51 - Headache   





(3) Leptomeningeal metastases


Code(s): C79.49 - SECONDARY MALIGNANT NEOPLASM OF OTH PARTS OF NERVOUS SYSTEM

## 2018-05-25 NOTE — PN
Progress Note (short form)





- Note


Progress Note: 


Radiation Oncology


Pt seen this morning, chart/films reviewed, consult dictated.


66yo known to me with stage IV TNBC w progressive CNS disease/LMD s/p ommaya 

and IT MTX.


Currently not very responsive in ICU probably secondary to burden of disease. 

Would cont with IT chemo


and steroids, monitor for improvement of MS. I would not recommend adding RT (

whole brain) at this point.


Her prognosis is poor and any RT added would unlikely improve her outcome.

## 2018-05-25 NOTE — PN
Physical Exam: 


SUBJECTIVE: Patient seen and examined in ICU. She is arousable, appears weak, 

minimally responsive to questioning. 








OBJECTIVE:





 Vital Signs











 Period  Temp  Pulse  Resp  BP Sys/Zhao  Pulse Ox


 


 Last 24 Hr  97.4 F-99.4 F  63-98  15-21  131-175/55-85  100-100








PE:


General: Malaise, eyes closed


HEENT: frontal port placed, dressing cdi 


Pulm: no sob, regular 


CV: s1 s2 rrr


Abd: soft, nd 


Ext: trace le edema 


 Laboratory Results - last 24 hr











  05/24/18 05/24/18 05/24/18





  05:25 13:30 17:30


 


WBC   


 


RBC   


 


Hgb   


 


Hct   


 


MCV   


 


MCH   


 


MCHC   


 


RDW   


 


Plt Count   


 


MPV   


 


Neutrophils % (Manual)  79.6  


 


Band Neutrophils %  1.0  


 


Lymphocytes % (Manual)  4.1 L  


 


Monocytes % (Manual)  14 H  


 


Eosinophils % (Manual)  0.0  


 


Basophils % (Manual)  0.0  


 


Myelocytes % (Man)  1  D  


 


Promyelocytes % (Man)  0  


 


Blast Cells % (Manual)  0  


 


Metamyelocytes  0  


 


Platelet Estimate  Increased  


 


Polychromasia  1+  


 


Macrocytosis  1+  


 


Ovalocytes  1+  


 


Sodium   128 L  127 L


 


Potassium   3.7  3.7


 


Chloride   92 L  91 L


 


Carbon Dioxide   26  28


 


Anion Gap   10  8


 


BUN   12  11


 


Creatinine   0.6  0.5 L


 


Random Glucose   165 H  164 H


 


Calcium   8.0 L  8.1 L


 


Phenytoin   














  05/25/18 05/25/18 05/25/18





  05:55 05:55 05:55


 


WBC  19.4 H  


 


RBC  3.60  


 


Hgb  12.8  


 


Hct  37.7  


 


MCV  104.6 H  


 


MCH  35.5 H  


 


MCHC  33.9  


 


RDW  18.6 H  


 


Plt Count  202  D  


 


MPV  7.1 L  


 


Neutrophils % (Manual)   


 


Band Neutrophils %   


 


Lymphocytes % (Manual)   


 


Monocytes % (Manual)   


 


Eosinophils % (Manual)   


 


Basophils % (Manual)   


 


Myelocytes % (Man)   


 


Promyelocytes % (Man)   


 


Blast Cells % (Manual)   


 


Metamyelocytes   


 


Platelet Estimate   


 


Polychromasia   


 


Macrocytosis   


 


Ovalocytes   


 


Sodium   127 L 


 


Potassium   3.6 


 


Chloride   91 L 


 


Carbon Dioxide   28 


 


Anion Gap   8 


 


BUN   11 


 


Creatinine   0.4 L 


 


Random Glucose   156 H 


 


Calcium   8.0 L 


 


Phenytoin    < 2.5 L








Active Medications











Generic Name Dose Route Start Last Admin





  Trade Name Freq  PRN Reason Stop Dose Admin


 


Acetaminophen  650 mg  05/23/18 10:56  05/23/18 20:08





  Tylenol -  PO   650 mg





  Q4H PRN   Administration





  HEADACHE   





     





     





     


 


Acetaminophen  1,000 mg  05/23/18 23:18  05/25/18 01:09





  Ofirmev Injection -  IVPB   1,000 mg





  Q6H PRN   Administration





  HEADACHE   





     





     





     


 


Acetaminophen/Codeine Phosphate  1 tab  05/24/18 12:04  





  Tylenol # 3 -  PO   





  Q4H PRN   





  PAIN LEVEL 4 - 6   





     





     





     


 


Amlodipine Besylate  5 mg  05/24/18 10:00  05/24/18 09:26





  Norvasc -  PO   Not Given





  DAILY [facility]   





     





     





     





     


 


Chlorhexidine Gluconate  1 applic  05/23/18 22:00  05/24/18 23:17





  Hibiclens For Decolonization -  TP   1 applic





  HS ANTONIO   Administration





     





     





     





     


 


Dexamethasone Sodium Phosphate  4 mg  05/23/18 12:30  05/25/18 06:18





  Decadron Injection -  IVPUSH   4 mg





  Q6H ANTONIO   Administration





     





     





     





     


 


Docusate Sodium  100 mg  05/24/18 14:00  05/25/18 06:15





  Colace -  PO   Not Given





  TID ANTONIO   





     





     





     





     


 


Fentanyl  1 patch  05/25/18 09:15  05/25/18 08:50





  Duragesic 12mcg Patch -  TD  05/29/18 09:14  1 patch





  Q72H ANTONIO   Administration





     





     





     





     


 


Hydralazine HCl  10 mg  05/25/18 07:27  





  Apresoline Injection -  IVPUSH   





  Q6H PRN   





  HYPERTENSION   





     





     





     


 


Cefazolin Sodium 1 gm/  50 mls @ 100 mls/hr  05/24/18 18:00  05/25/18 00:59





  Dextrose  IVPB  05/25/18 17:59  100 mls/hr





  Q8H-IV ANTONIO   Administration





     





     





     





     


 


Lorazepam  1 mg  05/24/18 16:53  05/24/18 23:24





  Ativan Injection -  IVPUSH   1 mg





  Q6H PRN   Administration





  ANXIETY   





     





     





     


 


Magnesium Oxide  400 mg  05/23/18 22:00  05/24/18 21:27





  Mag-Ox -  PO   Not Given





  BID [facility]   





     





     





     





     


 


Miscellaneous  1 each  05/23/18 10:56  





  Duragesic Patch Waste  TD   





  PRN PRN   





  PAIN   





     





     





     


 


Mupirocin  1 applic  05/23/18 10:00  05/24/18 22:17





  Bactroban Ointment (For Decolonization) -  NS  05/28/18 09:59  1 applic





  BID ANTONIO   Administration





     





     





     





     


 


Nystatin  500,000 units  05/23/18 12:00  05/25/18 06:15





  Nystatin Oral Suspension -  PO   Not Given





  Q6HPO ANTONIO   





     





     





     





     


 


Ondansetron HCl  8 mg  05/23/18 14:46  05/24/18 08:36





  Zofran Injection  IVPB   8 mg





  Q8H PRN   Administration





  NAUSEA   





     





     





     


 


Oxycodone HCl  5 mg  05/23/18 10:56  





  Roxicodone -  PO   





  Q4H PRN   





  PAIN 4-6   





     





     





     


 


Oxycodone HCl  10 mg  05/23/18 10:56  05/23/18 20:09





  Roxicodone -  PO   10 mg





  Q4H PRN   Administration





  PAIN LEVEL 6-10   





     





     





     


 


Prochlorperazine Edisylate  10 mg  05/23/18 10:56  





  Compazine Injection -  IVPB   





  Q4H PRN   





  NAUSEA AND/OR VOMITING   





     





     





     


 


Prochlorperazine Maleate  10 mg  05/23/18 10:56  





  Compazine -  PO   





  Q4H PRN   





  NAUSEA AND/OR VOMITING   





     





     





     











 Microbiology





05/24/18 12:35   Cerebral Spinal Fluid - Ventricular Csf   Gram Stain - Final


05/23/18 12:30   Blood - Peripheral Venous   Blood Culture - Preliminary


                            NO GROWTH OBTAINED AFTER 24 HOURS, INCUBATION TO 

CONTINUE


                            FOR 4 DAYS.


05/23/18 08:08   Blood - Karri Cath   Blood Culture - Preliminary


                            NO GROWTH OBTAINED AFTER 24 HOURS, INCUBATION TO 

CONTINUE


                            FOR 4 DAYS.


05/23/18 12:15   Urine - Urine Clean Catch   Urine Culture - Preliminary


                            Lactose Fermenting Neg Bacilli


                            Pending Organism


05/18/18 08:30   Cerebral Spinal Fluid - Lumbar Puncture   Gram Stain - Final








Assessment: 67 year old female with PMHx of osteoarthritis, GERD, triple 

negative metastatic right breast cancer (bone) diagnosed 9/2017 admitted with 

headache, nausea, vertigo, and diarrhea/indigestion x1 week. 





Plan: 


1. HTN


- Improved, isolated elevation overnight requiring hydralazine


- Continue norvasc 5mg daily, goal SBP <150





2. Metastatic right breast cancer


- With leptomeningeal involvement


- s/p Ommaya reservoir placement with x1 dose MXT 5/24 


- Methotrexate to be doses x2/week


- Continue Decadron


- Cytology sent from OR, follow results


- Heme/onc, neurosurgery following





3. Hyponatremia


- Corrected: 128


- Continue NS 60cc/hr





4. UTI, leukocytosis 


- Continue with cefazolin


- ID seeing 





5. DVT ppx 


- MARI stockings 








Problem List





- Problems


(1) Breast cancer metastasized to bone


Code(s): C50.919 - MALIGNANT NEOPLASM OF UNSP SITE OF UNSPECIFIED FEMALE BREAST

; C79.51 - SECONDARY MALIGNANT NEOPLASM OF BONE   


Qualifiers: 


   Laterality: right   Qualified Code(s): C50.911 - Malignant neoplasm of 

unspecified site of right female breast; C79.51 - Secondary malignant neoplasm 

of bone; C79.51 - Secondary malignant neoplasm of bone; C79.51 - Secondary 

malignant neoplasm of bone; C79.51 - Secondary malignant neoplasm of bone   





(2) Headache


Code(s): R51 - HEADACHE   


Qualifiers: 


   Headache type: unspecified   Headache chronicity pattern: unspecified 

pattern   Intractability: intractable   Qualified Code(s): R51 - Headache   





(3) Leptomeningeal metastases


Code(s): C79.49 - SECONDARY MALIGNANT NEOPLASM OF OTH PARTS OF NERVOUS SYSTEM   





(4) Nausea


Code(s): R11.0 - NAUSEA   





Visit type





- Emergency Visit


Emergency Visit: Yes


ED Registration Date: 05/17/18


Care time: The patient presented to the Emergency Department on the above date 

and was hospitalized for further evaluation of their emergent condition.





- New Patient


This patient is new to me today: No





- Critical Care


Critical Care patient: No

## 2018-05-25 NOTE — PN
Progress Note, Physician


Chief Complaint: 





S/p intrathecal chemo


BP has settled down mostly < 150/90





- Current Medication List


Current Medications: 


Active Medications





Acetaminophen (Tylenol -)  650 mg PO Q4H PRN


   PRN Reason: HEADACHE


   Last Admin: 05/23/18 20:08 Dose:  650 mg


Acetaminophen (Ofirmev Injection -)  1,000 mg IVPB Q6H PRN


   PRN Reason: HEADACHE


   Last Admin: 05/25/18 01:09 Dose:  1,000 mg


Acetaminophen/Codeine Phosphate (Tylenol # 3 -)  1 tab PO Q4H PRN


   PRN Reason: PAIN LEVEL 4 - 6


Amlodipine Besylate (Norvasc -)  5 mg PO DAILY [facility]


   Last Admin: 05/24/18 09:26 Dose:  Not Given


Chlorhexidine Gluconate (Hibiclens For Decolonization -)  1 applic TP HS [facility]


   Last Admin: 05/24/18 23:17 Dose:  1 applic


Dexamethasone Sodium Phosphate (Decadron Injection -)  4 mg IVPUSH Q6H [facility]


   Last Admin: 05/25/18 06:18 Dose:  4 mg


Docusate Sodium (Colace -)  100 mg PO TID [facility]


   Last Admin: 05/25/18 06:15 Dose:  Not Given


Fentanyl (Duragesic 12mcg Patch -)  1 patch TD Q72H [facility]


   Stop: 05/29/18 09:14


Hydralazine HCl (Apresoline Injection -)  10 mg IVPUSH Q6H PRN


   PRN Reason: HYPERTENSION


Cefazolin Sodium 1 gm/ (Dextrose)  50 mls @ 100 mls/hr IVPB Q8H-IV [facility]


   Stop: 05/25/18 17:59


   Last Admin: 05/25/18 00:59 Dose:  100 mls/hr


Lorazepam (Ativan Injection -)  1 mg IVPUSH Q6H PRN


   PRN Reason: ANXIETY


   Last Admin: 05/24/18 23:24 Dose:  1 mg


Magnesium Oxide (Mag-Ox -)  400 mg PO BID [facility]


   Last Admin: 05/24/18 21:27 Dose:  Not Given


Miscellaneous (Duragesic Patch Waste)  1 each TD PRN PRN


   PRN Reason: PAIN


Mupirocin (Bactroban Ointment (For Decolonization) -)  1 applic NS BID [facility]


   Stop: 05/28/18 09:59


   Last Admin: 05/24/18 22:17 Dose:  1 applic


Nystatin (Nystatin Oral Suspension -)  500,000 units PO Q6HPO ANTONIO


   Last Admin: 05/25/18 06:15 Dose:  Not Given


Ondansetron HCl (Zofran Injection)  8 mg IVPB Q8H PRN


   PRN Reason: NAUSEA


   Last Admin: 05/24/18 08:36 Dose:  8 mg


Oxycodone HCl (Roxicodone -)  5 mg PO Q4H PRN


   PRN Reason: PAIN 4-6


Oxycodone HCl (Roxicodone -)  10 mg PO Q4H PRN


   PRN Reason: PAIN LEVEL 6-10


   Last Admin: 05/23/18 20:09 Dose:  10 mg


Prochlorperazine Edisylate (Compazine Injection -)  10 mg IVPB Q4H PRN


   PRN Reason: NAUSEA AND/OR VOMITING


Prochlorperazine Maleate (Compazine -)  10 mg PO Q4H PRN


   PRN Reason: NAUSEA AND/OR VOMITING











- Objective


Vital Signs: 


 Vital Signs











Temperature  98.1 F   05/25/18 06:00


 


Pulse Rate  82   05/25/18 06:00


 


Respiratory Rate  20   05/25/18 06:00


 


Blood Pressure  131/55   05/25/18 06:00


 


O2 Sat by Pulse Oximetry (%)  100   05/24/18 20:57











Constitutional: Yes: No Distress


Cardiovascular: Yes: Regular Rate and Rhythm


Respiratory: Yes: CTA Bilaterally


Gastrointestinal: Yes: Soft


Edema: No


Neurological: Yes: Lethargy


Labs: 


 CBC, BMP





 05/25/18 05:55 





 05/25/18 05:55 





 INR, PTT











INR  1.05  (0.82-1.09)   05/24/18  05:25    








 Laboratory Tests











  05/25/18 05/25/18





  05:55 05:55


 


WBC  19.4 H 


 


Hct  37.7 


 


Plt Count  202  D 


 


Sodium   127 L


 


Creatinine   0.4 L














- ....Imaging


EKG: Image Reviewed (nsr)





Assessment/Plan





IMP:


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 

receiving intrathecal chemo


HTN





REC:


Cont Amlodipine 5mg daily for goal BP < 150/90

## 2018-05-25 NOTE — PN
Teaching Attending Note


Name of Resident: Kajal Parkinson





ATTENDING PHYSICIAN STATEMENT





I saw and evaluated the patient.


I reviewed the resident's note and discussed the case with the resident.


I agree with the resident's findings and plan as documented.








SUBJECTIVE:


Patient seen and examined in the ICU.  Drowsy but arousable.  Uncomfortable.   


(+) ROMERO 


No CP or SOB.  





 Intake & Output











 05/22/18 05/23/18 05/24/18 05/25/18





 23:59 23:59 23:59 23:59


 


Intake Total  570


 


Output Total  1650 3785 300


 


Balance 520 1275 -1000 270


 


Weight   206 lb 








 Last Vital Signs











Temp Pulse Resp BP Pulse Ox


 


 98.1 F   86   18   154/60   98 


 


 05/25/18 06:00  05/25/18 08:00  05/25/18 08:00  05/25/18 08:00  05/25/18 09:00








Active Medications





Acetaminophen (Ofirmev Injection -)  1,000 mg IVPB Q6H PRN


   PRN Reason: HEADACHE


   Last Admin: 05/25/18 01:09 Dose:  1,000 mg


Acetaminophen/Codeine Phosphate (Tylenol # 3 -)  1 tab PO Q4H PRN


   PRN Reason: PAIN LEVEL 4 - 6


Amlodipine Besylate (Norvasc -)  5 mg PO DAILY [facility]


   Last Admin: 05/25/18 09:19 Dose:  5 mg


Chlorhexidine Gluconate (Hibiclens For Decolonization -)  1 applic TP HS [facility]


   Last Admin: 05/24/18 23:17 Dose:  1 applic


Dexamethasone Sodium Phosphate (Decadron Injection -)  4 mg IVPUSH Q6H [facility]


   Last Admin: 05/25/18 06:18 Dose:  4 mg


Docusate Sodium (Colace -)  100 mg PO TID [facility]


   Last Admin: 05/25/18 06:15 Dose:  Not Given


Fentanyl (Duragesic 25mcg Patch -)  1 patch TD Q72H [facility]


   Stop: 05/26/18 11:59


Hydralazine HCl (Apresoline Injection -)  10 mg IVPUSH Q6H PRN


   PRN Reason: HYPERTENSION


Cefazolin Sodium 1 gm/ (Dextrose)  50 mls @ 100 mls/hr IVPB Q8H-IV [facility]


   Stop: 05/25/18 17:59


   Last Admin: 05/25/18 09:19 Dose:  100 mls/hr


Lorazepam (Ativan Injection -)  1 mg IVPUSH Q6H PRN


   PRN Reason: ANXIETY


   Last Admin: 05/25/18 10:25 Dose:  1 mg


Magnesium Oxide (Mag-Ox -)  400 mg PO BID [facility]


   Last Admin: 05/25/18 09:20 Dose:  400 mg


Miscellaneous (Duragesic Patch Waste)  1 each TD PRN PRN


   PRN Reason: PAIN


Mupirocin (Bactroban Ointment (For Decolonization) -)  1 applic NS BID [facility]


   Stop: 05/28/18 09:59


   Last Admin: 05/24/18 22:17 Dose:  1 applic


Nystatin (Nystatin Oral Suspension -)  500,000 units PO Q6HPO [facility]


   Last Admin: 05/25/18 06:15 Dose:  Not Given


Ondansetron HCl (Zofran Injection)  8 mg IVPB Q8H PRN


   PRN Reason: NAUSEA


   Last Admin: 05/24/18 08:36 Dose:  8 mg


Oxycodone HCl (Roxicodone -)  5 mg PO Q4H PRN


   PRN Reason: PAIN 4-6


Oxycodone HCl (Roxicodone -)  10 mg PO Q4H PRN


   PRN Reason: PAIN LEVEL 6-10


   Last Admin: 05/23/18 20:09 Dose:  10 mg


Prochlorperazine Edisylate (Compazine Injection -)  10 mg IVPB Q4H PRN


   PRN Reason: NAUSEA AND/OR VOMITING


Prochlorperazine Maleate (Compazine -)  10 mg PO Q4H PRN


   PRN Reason: NAUSEA AND/OR VOMITING














Gen: Drowsy, uncomfortable


Heart: bradycardic, regular


Lung: decreased breath sounds at the baes


Abd: soft, nontender


Ext: no edema





  


 Laboratory Results - last 24 hr











  05/24/18 05/24/18 05/24/18





  05:25 13:30 17:30


 


WBC   


 


RBC   


 


Hgb   


 


Hct   


 


MCV   


 


MCH   


 


MCHC   


 


RDW   


 


Plt Count   


 


MPV   


 


Neutrophils % (Manual)  79.6  


 


Band Neutrophils %  1.0  


 


Lymphocytes % (Manual)  4.1 L  


 


Monocytes % (Manual)  14 H  


 


Eosinophils % (Manual)  0.0  


 


Basophils % (Manual)  0.0  


 


Myelocytes % (Man)  1  D  


 


Promyelocytes % (Man)  0  


 


Blast Cells % (Manual)  0  


 


Metamyelocytes  0  


 


Platelet Estimate  Increased  


 


Polychromasia  1+  


 


Macrocytosis  1+  


 


Ovalocytes  1+  


 


Sodium   128 L  127 L


 


Potassium   3.7  3.7


 


Chloride   92 L  91 L


 


Carbon Dioxide   26  28


 


Anion Gap   10  8


 


BUN   12  11


 


Creatinine   0.6  0.5 L


 


Random Glucose   165 H  164 H


 


Calcium   8.0 L  8.1 L


 


Phenytoin   














  05/25/18 05/25/18 05/25/18





  05:55 05:55 05:55


 


WBC  19.4 H  


 


RBC  3.60  


 


Hgb  12.8  


 


Hct  37.7  


 


MCV  104.6 H  


 


MCH  35.5 H  


 


MCHC  33.9  


 


RDW  18.6 H  


 


Plt Count  202  D  


 


MPV  7.1 L  


 


Neutrophils % (Manual)   


 


Band Neutrophils %   


 


Lymphocytes % (Manual)   


 


Monocytes % (Manual)   


 


Eosinophils % (Manual)   


 


Basophils % (Manual)   


 


Myelocytes % (Man)   


 


Promyelocytes % (Man)   


 


Blast Cells % (Manual)   


 


Metamyelocytes   


 


Platelet Estimate   


 


Polychromasia   


 


Macrocytosis   


 


Ovalocytes   


 


Sodium   127 L 


 


Potassium   3.6 


 


Chloride   91 L 


 


Carbon Dioxide   28 


 


Anion Gap   8 


 


BUN   11 


 


Creatinine   0.4 L 


 


Random Glucose   156 H 


 


Calcium   8.0 L 


 


Phenytoin    < 2.5 L














ASSESSMENT AND PLAN:


Hypertensive Urgency


Metastatic Breast Ca 


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





-  Decadron


-  antiemetics


-  pain control


-  DVT prophylaxis


-  Oncology floor 





Dr Ellis 


Critical care time spent in reviewing chart, evaluating patient and formulating 

plan - 36 minutes.

## 2018-05-25 NOTE — PN
Physical Exam: 


SUBJECTIVE: Patient drowsy, denies CP, subjective dyspnea.








OBJECTIVE:





 Vital Signs











 Period  Temp  Pulse  Resp  BP Sys/Zhao  Pulse Ox


 


 Last 24 Hr  97.4 F-99.4 F  63-98  15-21  131-175/55-85  100-100








GENERAL: drowsy, A&O x3


HEAD: R frontal incision site C/D/I


CV: S1/S2, no M/R/G


Respiratory: decreased breath sounds B/L 


Abdomen: soft, no TTP

















 Laboratory Results - last 24 hr











  05/24/18 05/24/18 05/24/18





  05:25 13:30 17:30


 


WBC   


 


RBC   


 


Hgb   


 


Hct   


 


MCV   


 


MCH   


 


MCHC   


 


RDW   


 


Plt Count   


 


MPV   


 


Neutrophils % (Manual)  79.6  


 


Band Neutrophils %  1.0  


 


Lymphocytes % (Manual)  4.1 L  


 


Monocytes % (Manual)  14 H  


 


Eosinophils % (Manual)  0.0  


 


Basophils % (Manual)  0.0  


 


Myelocytes % (Man)  1  D  


 


Promyelocytes % (Man)  0  


 


Blast Cells % (Manual)  0  


 


Metamyelocytes  0  


 


Platelet Estimate  Increased  


 


Polychromasia  1+  


 


Macrocytosis  1+  


 


Ovalocytes  1+  


 


Sodium   128 L  127 L


 


Potassium   3.7  3.7


 


Chloride   92 L  91 L


 


Carbon Dioxide   26  28


 


Anion Gap   10  8


 


BUN   12  11


 


Creatinine   0.6  0.5 L


 


Random Glucose   165 H  164 H


 


Calcium   8.0 L  8.1 L


 


Phenytoin   














  05/25/18 05/25/18 05/25/18





  05:55 05:55 05:55


 


WBC  19.4 H  


 


RBC  3.60  


 


Hgb  12.8  


 


Hct  37.7  


 


MCV  104.6 H  


 


MCH  35.5 H  


 


MCHC  33.9  


 


RDW  18.6 H  


 


Plt Count  202  D  


 


MPV  7.1 L  


 


Neutrophils % (Manual)   


 


Band Neutrophils %   


 


Lymphocytes % (Manual)   


 


Monocytes % (Manual)   


 


Eosinophils % (Manual)   


 


Basophils % (Manual)   


 


Myelocytes % (Man)   


 


Promyelocytes % (Man)   


 


Blast Cells % (Manual)   


 


Metamyelocytes   


 


Platelet Estimate   


 


Polychromasia   


 


Macrocytosis   


 


Ovalocytes   


 


Sodium   127 L 


 


Potassium   3.6 


 


Chloride   91 L 


 


Carbon Dioxide   28 


 


Anion Gap   8 


 


BUN   11 


 


Creatinine   0.4 L 


 


Random Glucose   156 H 


 


Calcium   8.0 L 


 


Phenytoin    < 2.5 L








Active Medications











Generic Name Dose Route Start Last Admin





  Trade Name Freq  PRN Reason Stop Dose Admin


 


Acetaminophen  650 mg  05/23/18 10:56  05/23/18 20:08





  Tylenol -  PO   650 mg





  Q4H PRN   Administration





  HEADACHE   





     





     





     


 


Acetaminophen  1,000 mg  05/23/18 23:18  05/25/18 01:09





  Ofirmev Injection -  IVPB   1,000 mg





  Q6H PRN   Administration





  HEADACHE   





     





     





     


 


Acetaminophen/Codeine Phosphate  1 tab  05/24/18 12:04  





  Tylenol # 3 -  PO   





  Q4H PRN   





  PAIN LEVEL 4 - 6   





     





     





     


 


Amlodipine Besylate  5 mg  05/24/18 10:00  05/24/18 09:26





  Norvasc -  PO   Not Given





  DAILY [facility]   





     





     





     





     


 


Chlorhexidine Gluconate  1 applic  05/23/18 22:00  05/24/18 23:17





  Hibiclens For Decolonization -  TP   1 applic





  HS [facility]   Administration





     





     





     





     


 


Dexamethasone Sodium Phosphate  4 mg  05/23/18 12:30  05/25/18 06:18





  Decadron Injection -  IVPUSH   4 mg





  Q6H ANTONIO   Administration





     





     





     





     


 


Docusate Sodium  100 mg  05/24/18 14:00  05/25/18 06:15





  Colace -  PO   Not Given





  TID [facility]   





     





     





     





     


 


Fentanyl  1 patch  05/25/18 09:15  





  Duragesic 12mcg Patch -  TD  05/29/18 09:14  





  Q72H [facility]   





     





     





     





     


 


Hydralazine HCl  10 mg  05/25/18 07:27  





  Apresoline Injection -  IVPUSH   





  Q6H PRN   





  HYPERTENSION   





     





     





     


 


Cefazolin Sodium 1 gm/  50 mls @ 100 mls/hr  05/24/18 18:00  05/25/18 00:59





  Dextrose  IVPB  05/25/18 17:59  100 mls/hr





  Q8H-IV [facility]   Administration





     





     





     





     


 


Lorazepam  1 mg  05/24/18 16:53  05/24/18 23:24





  Ativan Injection -  IVPUSH   1 mg





  Q6H PRN   Administration





  ANXIETY   





     





     





     


 


Magnesium Oxide  400 mg  05/23/18 22:00  05/24/18 21:27





  Mag-Ox -  PO   Not Given





  BID [facility]   





     





     





     





     


 


Miscellaneous  1 each  05/23/18 10:56  





  Duragesic Patch Waste  TD   





  PRN PRN   





  PAIN   





     





     





     


 


Mupirocin  1 applic  05/23/18 10:00  05/24/18 22:17





  Bactroban Ointment (For Decolonization) -  NS  05/28/18 09:59  1 applic





  BID [facility]   Administration





     





     





     





     


 


Nystatin  500,000 units  05/23/18 12:00  05/25/18 06:15





  Nystatin Oral Suspension -  PO   Not Given





  Q6HPO [facility]   





     





     





     





     


 


Ondansetron HCl  8 mg  05/23/18 14:46  05/24/18 08:36





  Zofran Injection  IVPB   8 mg





  Q8H PRN   Administration





  NAUSEA   





     





     





     


 


Oxycodone HCl  5 mg  05/23/18 10:56  





  Roxicodone -  PO   





  Q4H PRN   





  PAIN 4-6   





     





     





     


 


Oxycodone HCl  10 mg  05/23/18 10:56  05/23/18 20:09





  Roxicodone -  PO   10 mg





  Q4H PRN   Administration





  PAIN LEVEL 6-10   





     





     





     


 


Prochlorperazine Edisylate  10 mg  05/23/18 10:56  





  Compazine Injection -  IVPB   





  Q4H PRN   





  NAUSEA AND/OR VOMITING   





     





     





     


 


Prochlorperazine Maleate  10 mg  05/23/18 10:56  





  Compazine -  PO   





  Q4H PRN   





  NAUSEA AND/OR VOMITING   





     





     





     











ASSESSMENT/PLAN:


67 year old female with Stage IV Breast CA, POD #1 for R frontal Ommaya 

reservoir placement with R ventricular catheter.  At presentation patient was 

in Hypertensive urgency, now resolved. 








1. POD #1 for R FRONTAL OMMAYA RESERVOIR PLACEMENT


- Leukocytosis 19, likely reactive


-Thecal Chemotherapy administered


- Close BP, Respiratory monitoring


- Appreciate heme/onc, neurosurgery reccs





2. HYPERTENSIVE URGENCY RESOLVED


- 24 BP range: 130's-180's/60's-70's


- C/w JNC 8, target 's/90's


- Continue Norvasc


- Continue to monitor





3. HYPONATREMIA


- Na 127 <-- 


- Continue to monitor








PROPHYLAXIS


SCD


Protonix





Patient stable for transfer to inpatient floors.





Visit type





- Emergency Visit


Emergency Visit: No





- New Patient


This patient is new to me today: No





- Critical Care


Critical Care patient: No

## 2018-05-25 NOTE — PN
Progress Note (short form)





- Note


Progress Note: 


Pt seen and examined





chart reviewed. 





lethargic


opens eyes


restless.





unable to obtain ROS


 


HEENT: closing eyes


Breasts: extensive erythema, nodularity chest wall right breast


Cor: RSR, No murmurs, No gallops


Abd: Soft, Normal bowel sounds, No organomegaly


Ext:No significant edema


Skin: No rashes, Integument intact











Temp Pulse Resp BP Pulse Ox


 


 97.4 F L  68   16   138/76   100 


 


 05/24/18 12:50  05/24/18 12:50  05/24/18 12:50  05/24/18 12:50  05/24/18 12:50








 CBC, BMP





 05/24/18 05:25 





 05/24/18 05:25 





 Current Medications











Generic Name Dose Route Start Last Admin





  Trade Name Freq  PRN Reason Stop Dose Admin


 


Acetaminophen  650 mg  05/23/18 10:56  05/23/18 20:08





  Tylenol -  PO   650 mg





  Q4H PRN   Administration





  [facility]AC[facility]   





     





     





     


 


Acetaminophen  1,000 mg  05/23/18 23:18  05/24/18 07:55





  Ofirmev Injection -  IVPB   1,000 mg





  Q6H PRN   Administration





  [facility]AC[facility]   





     





     





     


 


Acetaminophen/Codeine Phosphate  1 tab  05/24/18 12:04  





  Tylenol # 3 -  PO   





  Q4H PRN   





  PAIN LEVEL 4 - 6   





     





     





     


 


Amlodipine Besylate  5 mg  05/24/18 10:00  05/24/18 09:26





  Norvasc -  PO   Not Given





  DAILY [facility]   





     





     





     





     


 


Chlorhexidine Gluconate  1 applic  05/23/18 22:00  05/23/18 23:48





  Hibiclens For Decolonization -  TP   1 applic





  HS [facility]   Administration





     





     





     





     


 


Dexamethasone Sodium Phosphate  4 mg  05/23/18 12:30  05/24/18 05:57





  Decadron Injection -  IVPUSH   4 mg





  Q6H [facility]   Administration





     





     





     





     


 


Docusate Sodium  100 mg  05/24/18 14:00  





  Colace -  PO   





  TID [facility]   





     





     





     





     


 


Fentanyl  1 patch  05/25/18 09:15  





  Duragesic 12mcg Patch -  TD  05/29/18 09:14  





  Q72H [facility]   





     





     





     





     


 


Fentanyl  50 mcg  05/24/18 04:55  05/24/18 06:18





  Sublimaze Injection -  IVPUSH  05/25/18 04:59  50 mcg





  Q3H PRN   Administration





  PAIN LEVEL 7 - 10   





     





     





     


 


Sodium Chloride  1,000 mls @ 60 mls/hr  05/24/18 09:51  





  Normal Saline -  IV  05/25/18 07:54  





  ASDIR [facility]   





     





     





     





     


 


Ceftriaxone Sodium 1 gm/  50 mls @ 100 mls/hr  05/24/18 10:30  





  Dextrose  IVPB   





  DAILY [facility]   





     





     





  Protocol   





     


 


Cefazolin Sodium  50 mls @ 100 mls/hr  05/24/18 18:00  





  Ancef 1 Gm Premixed Ivpb -  IVPB  05/25/18 17:59  





  Q8H-IV [facility]   





     





     





     





     


 


Dextrose/Sodium Chloride  1,000 mls @ 80 mls/hr  05/24/18 12:15  





  Dextrose 5%-Normal Saline+20 Meq Kcl -  IV   





  ASDIR [facility]   





     





     





     





     


 


Magnesium Oxide  400 mg  05/23/18 22:00  05/24/18 09:26





  Mag-Ox -  PO   Not Given





  BID [facility]   





     





     





     





     


 


Miscellaneous  1 each  05/23/18 10:56  





  Duragesic Patch Waste  TD   





  PRN PRN   





  PAIN   





     





     





     


 


Mupirocin  1 applic  05/23/18 10:00  05/24/18 09:27





  Bactroban Ointment (For Decolonization) -  NS  05/28/18 09:59  1 applic





  BID [facility]   Administration





     





     





     





     


 


Nystatin  500,000 units  05/23/18 12:00  05/24/18 05:57





  Nystatin Oral Suspension -  PO   500,000 units





  Q6HPO [facility]   Administration





     





     





     





     


 


Ondansetron HCl  8 mg  05/23/18 14:46  05/24/18 08:36





  Zofran Injection  IVPB   8 mg





  Q8H PRN   Administration





  NAUSEA   





     





     





     


 


Oxycodone HCl  5 mg  05/23/18 10:56  





  Roxicodone -  PO   





  Q4H PRN   





  PAIN 4-6   





     





     





     


 


Oxycodone HCl  10 mg  05/23/18 10:56  05/23/18 20:09





  Roxicodone -  PO   10 mg





  Q4H PRN   Administration





  PAIN LEVEL 6-10   





     





     





     


 


Prochlorperazine Edisylate  10 mg  05/23/18 10:56  





  Compazine Injection -  IVPB   





  Q4H PRN   





  NAUSEA AND/OR VOMITING   





     





     





     


 


Prochlorperazine Maleate  10 mg  05/23/18 10:56  





  Compazine -  PO   





  Q4H PRN   





  NAUSEA AND/OR VOMITING   





     





     





     








advanced TNBC. 


LMD


HTN 





s/p Ommaya


s/p IT MTX 12mg on 5/24


c/w dex


guarded prognosis

## 2018-05-26 LAB
ALBUMIN SERPL-MCNC: 3.2 G/DL (ref 3.4–5)
ALP SERPL-CCNC: 89 U/L (ref 45–117)
ALT SERPL-CCNC: 35 U/L (ref 12–78)
ANION GAP SERPL CALC-SCNC: 12 MMOL/L (ref 8–16)
APPEARANCE CSF: CLEAR
AST SERPL-CCNC: 60 U/L (ref 15–37)
BILIRUB SERPL-MCNC: 0.6 MG/DL (ref 0.2–1)
BUN SERPL-MCNC: 8 MG/DL (ref 7–18)
CALCIUM SERPL-MCNC: 7.8 MG/DL (ref 8.5–10.1)
CHLORIDE SERPL-SCNC: 89 MMOL/L (ref 98–107)
CO2 SERPL-SCNC: 27 MMOL/L (ref 21–32)
COLOR CSF: COLORLESS
CREAT SERPL-MCNC: 0.3 MG/DL (ref 0.55–1.02)
DEPRECATED RDW RBC AUTO: 18.4 % (ref 11.6–15.6)
GLUCOSE CSF-MCNC: 55 MG/DL (ref 50–80)
GLUCOSE SERPL-MCNC: 133 MG/DL (ref 74–106)
HCT VFR BLD CALC: 37.5 % (ref 32.4–45.2)
HGB BLD-MCNC: 12.6 GM/DL (ref 10.7–15.3)
MCH RBC QN AUTO: 35.2 PG (ref 25.7–33.7)
MCHC RBC AUTO-ENTMCNC: 33.6 G/DL (ref 32–36)
MCV RBC: 105 FL (ref 80–96)
PLATELET # BLD AUTO: 184 K/MM3 (ref 134–434)
PMV BLD: 7.2 FL (ref 7.5–11.1)
POTASSIUM SERPLBLD-SCNC: 3.7 MMOL/L (ref 3.5–5.1)
PROT SERPL-MCNC: 6.4 G/DL (ref 6.4–8.2)
RBC # BLD AUTO: 3.57 M/MM3 (ref 3.6–5.2)
SODIUM SERPL-SCNC: 128 MMOL/L (ref 136–145)
WBC # BLD AUTO: 17.3 K/MM3 (ref 4–10)
WBC # CSF: 4 /UL

## 2018-05-26 RX ADMIN — NYSTATIN SCH UNITS: 100000 SUSPENSION ORAL at 18:16

## 2018-05-26 RX ADMIN — MUPIROCIN SCH APPLIC: 20 OINTMENT TOPICAL at 22:10

## 2018-05-26 RX ADMIN — DEXAMETHASONE SODIUM PHOSPHATE SCH MG: 4 INJECTION, SOLUTION INTRAMUSCULAR; INTRAVENOUS at 18:16

## 2018-05-26 RX ADMIN — CHLORHEXIDINE GLUCONATE SCH APPLIC: 213 SOLUTION TOPICAL at 22:00

## 2018-05-26 RX ADMIN — PIPERACILLIN SODIUM,TAZOBACTAM SODIUM SCH MLS/HR: 3; .375 INJECTION, POWDER, FOR SOLUTION INTRAVENOUS at 18:16

## 2018-05-26 RX ADMIN — AMLODIPINE BESYLATE SCH: 5 TABLET ORAL at 09:13

## 2018-05-26 RX ADMIN — MUPIROCIN SCH APPLIC: 20 OINTMENT TOPICAL at 09:12

## 2018-05-26 RX ADMIN — DEXAMETHASONE SODIUM PHOSPHATE SCH MG: 4 INJECTION, SOLUTION INTRAMUSCULAR; INTRAVENOUS at 12:47

## 2018-05-26 RX ADMIN — MAGNESIUM OXIDE TAB 400 MG (241.3 MG ELEMENTAL MG) SCH: 400 (241.3 MG) TAB at 09:13

## 2018-05-26 RX ADMIN — DEXAMETHASONE SODIUM PHOSPHATE SCH MG: 4 INJECTION, SOLUTION INTRAMUSCULAR; INTRAVENOUS at 05:52

## 2018-05-26 RX ADMIN — SODIUM CHLORIDE SCH MLS/HR: 9 INJECTION, SOLUTION INTRAVENOUS at 11:15

## 2018-05-26 RX ADMIN — DOCUSATE SODIUM SCH: 100 CAPSULE, LIQUID FILLED ORAL at 05:49

## 2018-05-26 RX ADMIN — NYSTATIN SCH UNITS: 100000 SUSPENSION ORAL at 05:49

## 2018-05-26 RX ADMIN — DOCUSATE SODIUM SCH MG: 100 CAPSULE, LIQUID FILLED ORAL at 21:33

## 2018-05-26 RX ADMIN — DOCUSATE SODIUM SCH: 100 CAPSULE, LIQUID FILLED ORAL at 13:32

## 2018-05-26 RX ADMIN — NYSTATIN SCH UNITS: 100000 SUSPENSION ORAL at 12:00

## 2018-05-26 RX ADMIN — PIPERACILLIN SODIUM,TAZOBACTAM SODIUM SCH MLS/HR: 3; .375 INJECTION, POWDER, FOR SOLUTION INTRAVENOUS at 13:37

## 2018-05-26 NOTE — PN
Progress Note, Physician





- Current Medication List


Current Medications: 


Active Medications





Acetaminophen/Codeine Phosphate (Tylenol # 3 -)  1 tab PO Q4H PRN


   PRN Reason: PAIN LEVEL 4 - 6


Amlodipine Besylate (Norvasc -)  5 mg PO DAILY [facility]


   Last Admin: 05/25/18 09:19 Dose:  5 mg


Chlorhexidine Gluconate (Hibiclens For Decolonization -)  1 applic TP HS [facility]


   Last Admin: 05/25/18 21:25 Dose:  1 applic


Dexamethasone Sodium Phosphate (Decadron Injection -)  4 mg IVPUSH Q6H [facility]


   Last Admin: 05/26/18 05:52 Dose:  4 mg


Docusate Sodium (Colace -)  100 mg PO TID [facility]


   Last Admin: 05/26/18 05:49 Dose:  Not Given


Fentanyl (Duragesic 25mcg Patch -)  1 patch TD Q72H [facility]


   Stop: 05/26/18 11:59


   Last Admin: 05/25/18 12:20 Dose:  1 patch


Fentanyl (Sublimaze Injection -)  25 mcg IVPUSH Q4H PRN


   PRN Reason: PAIN LEVEL 7 - 10


   Stop: 05/26/18 16:14


   Last Admin: 05/26/18 08:00 Dose:  25 mcg


Hydralazine HCl (Apresoline Injection -)  10 mg IVPUSH Q6H PRN


   PRN Reason: HYPERTENSION


Lorazepam (Ativan Injection -)  1 mg IVPUSH Q6H PRN


   PRN Reason: ANXIETY


   Last Admin: 05/25/18 21:37 Dose:  1 mg


Magnesium Oxide (Mag-Ox -)  400 mg PO BID [facility]


   Last Admin: 05/25/18 21:22 Dose:  Not Given


Miscellaneous (Duragesic Patch Waste)  1 each TD PRN PRN


   PRN Reason: PAIN


   Last Admin: 05/25/18 19:30 Dose:  1 each


Mupirocin (Bactroban Ointment (For Decolonization) -)  1 applic NS BID [facility]


   Stop: 05/28/18 09:59


   Last Admin: 05/25/18 21:26 Dose:  1 applic


Nystatin (Nystatin Oral Suspension -)  500,000 units PO Q6HPO [facility]


   Last Admin: 05/26/18 05:49 Dose:  500,000 units


Ondansetron HCl (Zofran Injection)  8 mg IVPB Q8H PRN


   PRN Reason: NAUSEA


   Last Admin: 05/24/18 08:36 Dose:  8 mg


Oxycodone HCl (Roxicodone -)  5 mg PO Q4H PRN


   PRN Reason: PAIN 4-6


Oxycodone HCl (Roxicodone -)  10 mg PO Q4H PRN


   PRN Reason: PAIN LEVEL 6-10


   Last Admin: 05/23/18 20:09 Dose:  10 mg


Prochlorperazine Edisylate (Compazine Injection -)  10 mg IVPB Q4H PRN


   PRN Reason: NAUSEA AND/OR VOMITING


Prochlorperazine Maleate (Compazine -)  10 mg PO Q4H PRN


   PRN Reason: NAUSEA AND/OR VOMITING











- Objective


Vital Signs: 


 Vital Signs











Temperature  97.7 F   05/26/18 08:00


 


Pulse Rate  70   05/26/18 08:00


 


Respiratory Rate  20   05/26/18 08:00


 


Blood Pressure  150/55   05/26/18 08:00


 


O2 Sat by Pulse Oximetry (%)  99   05/25/18 20:12











Eyes: Yes: WNL, Conjunctiva Clear, EOM Intact


HENT: Yes: WNL, Atraumatic, Normocephalic


Neck: Yes: WNL, Supple, Trachea Midline


Cardiovascular: Yes: WNL, Regular Rate and Rhythm


Respiratory: Yes: WNL, Regular, CTA Bilaterally


Gastrointestinal: Yes: WNL, Normal Bowel Sounds


Genitourinary: Yes: WNL


Musculoskeletal: Yes: WNL


Extremities: Yes: WNL


Edema: No


Integumentary: Yes: WNL


...Motor Strength: WNL


Psychiatric: Yes: WNL


Labs: 


 CBC, BMP





 05/26/18 05:50 





 05/26/18 05:50 





 INR, PTT











INR  1.05  (0.82-1.09)   05/24/18  05:25    














Assessment/Plan








IMP:


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 

receiving intrathecal chemo


HTN





REC:


Cont Amlodipine 5mg daily for goal BP < 150/90











coverage dr. Steen

## 2018-05-26 NOTE — PN
Progress Note (short form)





- Note


Progress Note: 


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 

(s/p genzar and carboplatin) diagnosed 2017 now on taxol, mammary carcinoma 

of lobular phenotype and LCIS with lymphovascular invasion (currently on 

chemotherapy and sees Dr. Romero), IBD, GERD, RLE thrombophlebitis, and 

osteoasthritis presented to Dr. Romero office for x1 week of headache and nausea

, vertigo and diarrhea/indigestion. Pt would have recieved her 5th dose today, 

she started about 1 month ago. 


mild left sided HA -- ? double vision though not an issue now. Sees optho- ? 

cataract L eye. 


no focal weakness, numbness, slurred speech. 





MRI reviewed. 


MRI/BRAIN MRI W W/O CONTRAST : Upon further review of the images, there is 

faint focal linear-like increased T2 signal intensity in superior aspect of the 

right cerebellum that appears to be enhancing on the postcontrast examination 

measuring approximately 11 mm in anterior-posterior length and 7 mm in width 

suggestive of focal cortical enhancement. There is also leptomeningeal 

enhancement within the superior cerebellar folia, bilaterally. There is 

leptomeningeal enhancement along posterior margin of the clivus as well as 

dural enhancement in the included portion of the upper cervical spine, mainly 

anteriorly up to upper C3 level. 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 

carcinomatosis, considering the clinical history of breast cancer with focal 

cortical involvement in the right cerebellum, superiorly. Please correlate with 

CSF fluid analysis and close follow-up MRI is recommended. Case discussed with 

Dr. Gabriel Verde and Dr. Edgar Romero, caring attending physician





FU : 





s/p OMAYA given methotrexate, has been lethargic 


barely answers name, was c/o of HA 





- Past Medical History


Gastrointestinal: Yes: GERD, Irritable Bowel Disease


Musculoskeletal: Yes: Osteoarthritis


Additional Medical History: H/O thrombophlebitis right LE





- Alcohol/Substance Use


Hx Alcohol Use: No


History of Substance Use: reports: None





- Smoking History


Smoking history: Never smoked


Have you smoked in the past 12 months: No





- Social History


ADL: Independent


Occupation: [employer] nurse 


History of Recent Travel: No





Home Medications





- Allergies


Allergies/Adverse Reactions: 


 Allergies











Allergy/AdvReac Type Severity Reaction Status Date / Time


 


adhesive tape Allergy  Rash Verified 18 10:23


 


No Known Drug Allergies Allergy   Verified 18 10:23














- Home Medications


Home Medications: 


Ambulatory Orders





Multivitamins [Tab-A-Vit -] 1 tab PO DAILY 17 


Ranitidine [Zantac -] 150 mg PO BID 17 


Vitamin C 500 mg PO DAILY 17 


Vitamin D - 1,000 unit PO DAILY 17 


Oxycodone HCl/Acetaminophen [Endocet 5-325 Tablet] 1 each PO Q8H PRN MDD 3  


Dexamethasone [Decadron] 4 mg PO TID 18 


Inderal - 40 mg PO DAILY 18 


Magnesium Oxide 500 mg PO BID 18 











Family Disease History





- Family Disease History


Family Disease History: CA: Grandparent (mat GM breast ca 50  57), 

Father (panceratic ca 82), Mother (CRC 46  57), Sister (mutiple meyloma)





Physical Exam-Neuro


Vital Signs: 


 


  


 Vital Signs











Temperature  97.9 F   18 12:00


 


Pulse Rate  90   18 12:00


 


Respiratory Rate  19   18 12:00


 


Blood Pressure  162/72   18 12:00


 


O2 Sat by Pulse Oximetry (%)  100   18 08:00














Labs: 


  CBCD











WBC  17.3 K/mm3 (4.0-10.0)  H  18  05:50    


 


RBC  3.57 M/mm3 (3.60-5.2)  L  18  05:50    


 


Hgb  12.6 GM/dL (10.7-15.3)   18  05:50    


 


Hct  37.5 % (32.4-45.2)   18  05:50    


 


MCV  105.0 fl (80-96)  H  18  05:50    


 


MCHC  33.6 g/dl (32.0-36.0)   18  05:50    


 


RDW  18.4 % (11.6-15.6)  H  18  05:50    


 


Plt Count  184 K/MM3 (134-434)   18  05:50    


 


MPV  7.2 fl (7.5-11.1)  L  18  05:50    








 CMP











Sodium  128 mmol/L (136-145)  L  18  05:50    


 


Potassium  3.7 mmol/L (3.5-5.1)   18  05:50    


 


Chloride  89 mmol/L ()  L  18  05:50    


 


Carbon Dioxide  27 mmol/L (21-32)   18  05:50    


 


Anion Gap  12  (8-16)   18  05:50    


 


BUN  8 mg/dL (7-18)   18  05:50    


 


Creatinine  0.3 mg/dL (0.55-1.02)  L  18  05:50    


 


Creat Clearance w eGFR  > 60  (>60)   18  05:50    


 


Calcium  7.8 mg/dL (8.5-10.1)  L  18  05:50    


 


Total Bilirubin  0.6 mg/dL (0.2-1.0)  D 18  05:50    


 


AST  60 U/L (15-37)  H  18  05:50    


 


ALT  35 U/L (12-78)   18  05:50    


 


Alkaline Phosphatase  89 U/L ()   18  05:50    


 


Total Protein  6.4 g/dl (6.4-8.2)   18  05:50    


 


Albumin  3.2 g/dl (3.4-5.0)  L  18  05:50    

















- Neuro Exam


Level Of Consciousness: Yes: Alert, Oriented to Person (EOMI, min esotropia L 

eye, slight aniscoria L >R --constrict, no facial, motor 5/5, reflexes 

symmetric )





Imaging





- Results


MRI: Report Reviewed, Image Reviewed





Problem List





- Problems


(1) Breast cancer metastasized to bone


Code(s): C50.919 - MALIGNANT NEOPLASM OF UNSP SITE OF UNSPECIFIED FEMALE BREAST

; C79.51 - SECONDARY MALIGNANT NEOPLASM OF BONE   


Qualifiers: 


   Laterality: right   Qualified Code(s): C50.911 - Malignant neoplasm of 

unspecified site of right female breast; C79.51 - Secondary malignant neoplasm 

of bone; C79.51 - Secondary malignant neoplasm of bone; C79.51 - Secondary 

malignant neoplasm of bone; C79.51 - Secondary malignant neoplasm of bone   





(2) Headache


Code(s): R51 - HEADACHE   


Qualifiers: 


   Headache type: unspecified   Headache chronicity pattern: unspecified 

pattern   Intractability: intractable   Qualified Code(s): R51 - Headache   





(3) Leptomeningeal metastases


Code(s): C79.49 - SECONDARY MALIGNANT NEOPLASM OF OTH PARTS OF NERVOUS SYSTEM   





(4) Nausea


Code(s): R11.0 - NAUSEA   





Assessment/Plan


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 

(s/p genzar and carboplatin) diagnosed 2017 now on taxol, mammary carcinoma 

of lobular phenotype and LCIS with lymphovascular invasion , HX of focal RT , (

currently on chemotherapy and sees Dr. Romero), IBD, GERD, RLE thrombophlebitis, 

and osteoasthritis presented to Dr. Romero office for x1-2 week of headache and 

nausea, vertigo and diarrhea/indigestion.


MRI shows ? enhancement R cerebellar area/eptomeningeal disease --


S/P OMAYA with progressive obtundation/lethargy --concerning for increased ICP





spoke to ONC and neurosurgery -- agreed upon emergent LP (omaya could not be 

tapped bc of position) -- high volume tap was done bedside , retrieved clear 

CSF 36 cc apx, OP was not measured , closing pressure was 13, pt tolerated 

procedure well





sent for glucose, protein, cell count and micro and cytology --nurse aware and 

to walk down sample 


 


restart ABX -- Inc WBC 





will consider inc decadron if no help 





repeat HD CT in AM 








DR Stanford 











Problem List





- Problems


(1) Breast cancer metastasized to bone


Code(s): C50.919 - MALIGNANT NEOPLASM OF UNSP SITE OF UNSPECIFIED FEMALE BREAST

; C79.51 - SECONDARY MALIGNANT NEOPLASM OF BONE   


Qualifiers: 


   Laterality: right   Qualified Code(s): C50.911 - Malignant neoplasm of 

unspecified site of right female breast; C79.51 - Secondary malignant neoplasm 

of bone; C79.51 - Secondary malignant neoplasm of bone; C79.51 - Secondary 

malignant neoplasm of bone; C79.51 - Secondary malignant neoplasm of bone   





(2) Headache


Code(s): R51 - HEADACHE   


Qualifiers: 


   Headache type: unspecified   Headache chronicity pattern: unspecified 

pattern   Intractability: intractable   Qualified Code(s): R51 - Headache   





(3) Leptomeningeal metastases


Code(s): C79.49 - SECONDARY MALIGNANT NEOPLASM OF OTH PARTS OF NERVOUS SYSTEM   





(4) Nausea


Code(s): R11.0 - NAUSEA

## 2018-05-26 NOTE — PN
Physical Exam: 


SUBJECTIVE: Patient seen and examined in ICU. Minimally responsive before 

falling back asleep. In and out of lucidity. HA this AM 





Event: 


- Made DNR/DNI last night 


-  at bedside. 





OBJECTIVE:





 Vital Signs











 Period  Temp  Pulse  Resp  BP Sys/Zhao  Pulse Ox


 


 Last 24 Hr  97.5 F-98.5 F  70-96  18-28  131-163/52-83  





PE:


General: Malaise, eyes closed


HEENT: ommaya port placed, dressing cdi 


Pulm: no sob, regular 


CV: s1 s2 rrr


Abd: soft, nd 


Ext: trace le edema 





 Laboratory Results - last 24 hr











  05/26/18 05/26/18





  05:50 05:50


 


WBC   17.3 H


 


RBC   3.57 L


 


Hgb   12.6


 


Hct   37.5


 


MCV   105.0 H


 


MCH   35.2 H


 


MCHC   33.6


 


RDW   18.4 H


 


Plt Count   184


 


MPV   7.2 L


 


Neutrophils %   No Result Required.


 


Lymphocytes %   No Result Required.


 


Nucleated RBC %   0


 


Sodium  128 L 


 


Potassium  3.7 


 


Chloride  89 L 


 


Carbon Dioxide  27 


 


Anion Gap  12 


 


BUN  8 


 


Creatinine  0.3 L 


 


Creat Clearance w eGFR  > 60 


 


Random Glucose  133 H 


 


Calcium  7.8 L 


 


Total Bilirubin  0.6  D 


 


AST  60 H 


 


ALT  35 


 


Alkaline Phosphatase  89 


 


Total Protein  6.4 


 


Albumin  3.2 L 








Active Medications











Generic Name Dose Route Start Last Admin





  Trade Name Freq  PRN Reason Stop Dose Admin


 


Acetaminophen/Codeine Phosphate  1 tab  05/24/18 12:04  





  Tylenol # 3 -  PO   





  Q4H PRN   





  PAIN LEVEL 4 - 6   





     





     





     


 


Amlodipine Besylate  5 mg  05/24/18 10:00  05/26/18 09:13





  Norvasc -  PO   Not Given





  DAILY ANTONIO   





     





     





     





     


 


Chlorhexidine Gluconate  1 applic  05/23/18 22:00  05/25/18 21:25





  Hibiclens For Decolonization -  TP   1 applic





  HS ANTONIO   Administration





     





     





     





     


 


Dexamethasone Sodium Phosphate  4 mg  05/23/18 12:30  05/26/18 05:52





  Decadron Injection -  IVPUSH   4 mg





  Q6H ANTONIO   Administration





     





     





     





     


 


Docusate Sodium  100 mg  05/24/18 14:00  05/26/18 05:49





  Colace -  PO   Not Given





  TID ANTONIO   





     





     





     





     


 


Fentanyl  1 patch  05/25/18 12:00  05/25/18 12:20





  Duragesic 25mcg Patch -  TD  05/28/18 11:59  1 patch





  Q72H ANTONIO   Administration





     





     





     





     


 


Fentanyl  50 mcg  05/26/18 10:55  





  Sublimaze Injection -  IVPUSH  05/26/18 16:14  





  Q4H PRN   





  PAIN LEVEL 7 - 10   





     





     





     


 


Hydralazine HCl  10 mg  05/25/18 07:27  





  Apresoline Injection -  IVPUSH   





  Q6H PRN   





  HYPERTENSION   





     





     





     


 


Lorazepam  1 mg  05/24/18 16:53  05/25/18 21:37





  Ativan Injection -  IVPUSH   1 mg





  Q6H PRN   Administration





  ANXIETY   





     





     





     


 


Miscellaneous  1 each  05/26/18 10:30  





  Duragesic Patch Waste  TD   





  PRN PRN   





  PAIN   





     





     





     


 


Mupirocin  1 applic  05/23/18 10:00  05/26/18 09:12





  Bactroban Ointment (For Decolonization) -  NS  05/28/18 09:59  1 applic





  BID ANTONIO   Administration





     





     





     





     


 


Nystatin  500,000 units  05/23/18 12:00  05/26/18 05:49





  Nystatin Oral Suspension -  PO   500,000 units





  Q6HPO ANTONIO   Administration





     





     





     





     


 


Ondansetron HCl  8 mg  05/23/18 14:46  05/24/18 08:36





  Zofran Injection  IVPB   8 mg





  Q8H PRN   Administration





  NAUSEA   





     





     





     


 


Prochlorperazine Edisylate  10 mg  05/23/18 10:56  





  Compazine Injection -  IVPB   





  Q4H PRN   





  NAUSEA AND/OR VOMITING   





     





     





     











 Microbiology





05/23/18 08:08   Blood - Karri Cath   Blood Culture - Preliminary


                            NO GROWTH OBTAINED AFTER 72 HOURS, INCUBATION TO 

CONTINUE


                            FOR 2 DAYS.


05/23/18 12:15   Urine - Urine Clean Catch   Urine Culture - Final


                            Escherichia Coli


                            Pending Organism


05/24/18 12:35   Cerebral Spinal Fluid - Ventricular Csf   Gram Stain - Final


05/24/18 12:35   Cerebral Spinal Fluid - Ventricular Csf   CSF Culture - 

Preliminary


                            NO GROWTH OBTAINED AFTER 24 HOURS INCUBATION, 

REINCUBATED.


05/23/18 12:30   Blood - Peripheral Venous   Blood Culture - Preliminary


                            NO GROWTH OBTAINED AFTER 48 HOURS, INCUBATION TO 

CONTINUE


                            FOR 3 DAYS.


05/18/18 08:30   Cerebral Spinal Fluid - Lumbar Puncture   Gram Stain - Final











Assessment: 67 year old female with PMHx of osteoarthritis, GERD, triple 

negative metastatic right breast cancer (bone) diagnosed 9/2017 admitted with 

headache, nausea, vertigo, and diarrhea/indigestion x1 week. 





Plan: 


1. HTN


- Relatively controlled 


- Continue norvasc 5mg daily, goal SBP <150


- Hydralazine prn 





2. Metastatic right breast cancer


- With leptomeningeal involvement


- s/p Ommaya reservoir placement with x1 dose MXT 5/24 


- Methotrexate to be doses x2/week


- Continue Decadron


- Cytology sent from OR, follow results


- Pain control, fent patch 25mcg, increase prn pushes to 50mcg with ativan prn 


- Heme/onc, neurosurgery following





3. Hyponatremia


- Increase NS 75cc/hr


- Recheck in 4 hrs 





4. UTI, leukocytosis 


- Urine cx 2nd organism pending 


- Continue with cefazolin





5. DVT ppx 


- MARI stockings 








Problem List





- Problems


(1) Breast cancer metastasized to bone


Code(s): C50.919 - MALIGNANT NEOPLASM OF UNSP SITE OF UNSPECIFIED FEMALE BREAST

; C79.51 - SECONDARY MALIGNANT NEOPLASM OF BONE   


Qualifiers: 


   Laterality: right   Qualified Code(s): C50.911 - Malignant neoplasm of 

unspecified site of right female breast; C79.51 - Secondary malignant neoplasm 

of bone; C79.51 - Secondary malignant neoplasm of bone; C79.51 - Secondary 

malignant neoplasm of bone; C79.51 - Secondary malignant neoplasm of bone   





(2) Headache


Code(s): R51 - HEADACHE   


Qualifiers: 


   Headache type: unspecified   Headache chronicity pattern: unspecified 

pattern   Intractability: intractable   Qualified Code(s): R51 - Headache   





(3) Leptomeningeal metastases


Code(s): C79.49 - SECONDARY MALIGNANT NEOPLASM OF OTH PARTS OF NERVOUS SYSTEM   





(4) Nausea


Code(s): R11.0 - NAUSEA   





Visit type





- Emergency Visit


Emergency Visit: Yes


ED Registration Date: 05/17/18


Care time: The patient presented to the Emergency Department on the above date 

and was hospitalized for further evaluation of their emergent condition.





- New Patient


This patient is new to me today: No





- Critical Care


Critical Care patient: No

## 2018-05-26 NOTE — PN
Progress Note (short form)





- Note


Progress Note: 





SUBJECTIVE:


Patient seen and examined in the ICU. 





24Hr:


-worsening responsiveness


-made DNR/DNI


-considering hospice placement








 Vital Signs











Temp  98.3 F   05/26/18 14:00


 


Pulse  82   05/26/18 14:00


 


Resp  19   05/26/18 14:00


 


BP  158/92   05/26/18 14:00


 


Pulse Ox  100   05/26/18 08:00








 Intake & Output











 05/25/18 05/26/18 05/26/18





 23:59 11:59 23:59


 


Intake Total 1230 420 50


 


Output Total 650 450 950


 


Balance 580 -30 -900


 


Weight  91.399 kg 


 


Intake:   


 


   420 


 


    Normal Saline - 1,000 ml 720 420 





    @ 60 mls/hr IV ASDIR [facility]   





    Rx#:[id number]   


 


  IVPB 150  50


 


  Oral 360 0 


 


Output:   


 


  Urine 650 450 950


 


    [place] 650 450 950


 


Other:   


 


  Voiding Method Indwelling Catheter Indwelling Catheter 


 


  Weight Measurement Method  Built in [facility] 











Active Medications





Acetaminophen/Codeine Phosphate (Tylenol # 3 -)  1 tab PO Q4H PRN


   PRN Reason: PAIN LEVEL 4 - 6


Amlodipine Besylate (Norvasc -)  5 mg PO DAILY [facility]


   Last Admin: 05/26/18 09:13 Dose:  Not Given


Chlorhexidine Gluconate (Hibiclens For Decolonization -)  1 applic TP HS [facility]


   Last Admin: 05/25/18 21:25 Dose:  1 applic


Dexamethasone Sodium Phosphate (Decadron Injection -)  4 mg IVPUSH Q6H [facility]


   Last Admin: 05/26/18 12:47 Dose:  4 mg


Docusate Sodium (Colace -)  100 mg PO TID [facility]


   Last Admin: 05/26/18 13:32 Dose:  Not Given


Fentanyl (Duragesic 25mcg Patch -)  1 patch TD Q72H [facility]


   Stop: 05/28/18 11:59


   Last Admin: 05/25/18 12:20 Dose:  1 patch


Fentanyl (Sublimaze Injection -)  50 mcg IVPUSH Q4H PRN


   PRN Reason: PAIN LEVEL 7 - 10


   Stop: 05/26/18 16:14


   Last Admin: 05/26/18 13:15 Dose:  50 mcg


Hydralazine HCl (Apresoline Injection -)  10 mg IVPUSH Q6H PRN


   PRN Reason: HYPERTENSION


Sodium Chloride (Normal Saline -)  1,000 mls @ 75 mls/hr IV ASDIR [facility]


   Last Admin: 05/26/18 11:15 Dose:  75 mls/hr


Piperacillin Sod/Tazobactam (Sod 3.375 gm/ Dextrose)  50 mls @ 100 mls/hr IVPB 

Q8H-IV [facility]; Protocol


   Last Admin: 05/26/18 13:37 Dose:  100 mls/hr


Lorazepam (Ativan Injection -)  1 mg IVPUSH Q6H PRN


   PRN Reason: ANXIETY


   Last Admin: 05/26/18 11:09 Dose:  1 mg


Miscellaneous (Duragesic Patch Waste)  1 each TD PRN PRN


   PRN Reason: PAIN


Mupirocin (Bactroban Ointment (For Decolonization) -)  1 applic NS BID [facility]


   Stop: 05/28/18 09:59


   Last Admin: 05/26/18 09:12 Dose:  1 applic


Nystatin (Nystatin Oral Suspension -)  500,000 units PO Q6HPO [facility]


   Last Admin: 05/26/18 12:00 Dose:  500,000 units


Ondansetron HCl (Zofran Injection)  8 mg IVPB Q8H PRN


   PRN Reason: NAUSEA


   Last Admin: 05/24/18 08:36 Dose:  8 mg


Prochlorperazine Edisylate (Compazine Injection -)  10 mg IVPB Q4H PRN


   PRN Reason: NAUSEA AND/OR VOMITING

















Gen: poorly responsive, does not appear in pain


Heart: bradycardic, regular


Lung: diminished bilaterally


Abd: soft, nontender


Ext: no edema





  


  CBC, BMP





 05/26/18 05:50 





 05/26/18 05:50 











ASSESSMENT AND PLAN:


Hypertensive Urgency


Metastatic Breast Ca 


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





-  Decadron


-  antiemetics


-  pain control


-  DVT prophylaxis


-  Oncology floor per Onc and Neuro 








[place] ACNP





[id number]

## 2018-05-26 NOTE — PN
Progress Note, Physician


History of Present Illness: 





patient continues to be lethargic


still very agitated








- Current Medication List


Current Medications: 


Active Medications





Acetaminophen/Codeine Phosphate (Tylenol # 3 -)  1 tab PO Q4H PRN


   PRN Reason: PAIN LEVEL 4 - 6


Amlodipine Besylate (Norvasc -)  5 mg PO DAILY [facility]


   Last Admin: 05/26/18 09:13 Dose:  Not Given


Chlorhexidine Gluconate (Hibiclens For Decolonization -)  1 applic TP HS [facility]


   Last Admin: 05/25/18 21:25 Dose:  1 applic


Dexamethasone Sodium Phosphate (Decadron Injection -)  4 mg IVPUSH Q6H [facility]


   Last Admin: 05/26/18 12:47 Dose:  4 mg


Docusate Sodium (Colace -)  100 mg PO TID [facility]


   Last Admin: 05/26/18 05:49 Dose:  Not Given


Fentanyl (Duragesic 25mcg Patch -)  1 patch TD Q72H [facility]


   Stop: 05/28/18 11:59


   Last Admin: 05/25/18 12:20 Dose:  1 patch


Fentanyl (Sublimaze Injection -)  50 mcg IVPUSH Q4H PRN


   PRN Reason: PAIN LEVEL 7 - 10


   Stop: 05/26/18 16:14


   Last Admin: 05/26/18 13:15 Dose:  50 mcg


Hydralazine HCl (Apresoline Injection -)  10 mg IVPUSH Q6H PRN


   PRN Reason: HYPERTENSION


Sodium Chloride (Normal Saline -)  1,000 mls @ 75 mls/hr IV ASDIR [facility]


   Last Admin: 05/26/18 11:15 Dose:  75 mls/hr


Lorazepam (Ativan Injection -)  1 mg IVPUSH Q6H PRN


   PRN Reason: ANXIETY


   Last Admin: 05/26/18 11:09 Dose:  1 mg


Miscellaneous (Duragesic Patch Waste)  1 each TD PRN PRN


   PRN Reason: PAIN


Mupirocin (Bactroban Ointment (For Decolonization) -)  1 applic NS BID [facility]


   Stop: 05/28/18 09:59


   Last Admin: 05/26/18 09:12 Dose:  1 applic


Nystatin (Nystatin Oral Suspension -)  500,000 units PO Q6HPO [facility]


   Last Admin: 05/26/18 12:00 Dose:  500,000 units


Ondansetron HCl (Zofran Injection)  8 mg IVPB Q8H PRN


   PRN Reason: NAUSEA


   Last Admin: 05/24/18 08:36 Dose:  8 mg


Prochlorperazine Edisylate (Compazine Injection -)  10 mg IVPB Q4H PRN


   PRN Reason: NAUSEA AND/OR VOMITING











- Objective


Vital Signs: 


 Vital Signs











Temperature  97.9 F   05/26/18 12:00


 


Pulse Rate  90   05/26/18 12:00


 


Respiratory Rate  19   05/26/18 12:00


 


Blood Pressure  162/72   05/26/18 12:00


 


O2 Sat by Pulse Oximetry (%)  100   05/26/18 08:00











Constitutional: Yes: Other


Cardiovascular: Yes: Regular Rate and Rhythm


Respiratory: Yes: Regular, CTA Bilaterally


Gastrointestinal: Yes: Normal Bowel Sounds, Soft


Musculoskeletal: Yes: WNL


Extremities: Yes: WNL


Neurological: Yes: Other


Psychiatric: Yes: Other


Labs: 


 CBC, BMP





 05/26/18 05:50 





 05/26/18 05:50 





 INR, PTT











INR  1.05  (0.82-1.09)   05/24/18  05:25    














Assessment/Plan














Metastatic Breast Ca 


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


leukocytosis


electrolyte abn





plan





post op from Ommaya shunt placement 


monitor closely


will start patient on zosyn


one organism still pending


rest continue as per icu


monitor bp and mental status


continue cefazolin 


await for second organism





cc time 40 min

## 2018-05-26 NOTE — PN
Progress Note (short form)





- Note


Progress Note: 


Pt seen and examined





chart reviewed. 





now looks comfortable


just received fentanyl


d/w RN, mentioned she is more comfortable than before. Supposedly pain well 

controlled. 


Unable to obtain if he has headache due to lethargy. 





unable to obtain ROS.


 


HEENT: closing eyes


Breasts: extensive erythema, nodularity chest wall right breast


Cor: RSR, No murmurs, No gallops


Abd: Soft, Normal bowel sounds, No organomegaly


Ext:No significant edema


Skin: No rashes, Integument intact








  Last Vital Signs











Temp Pulse Resp BP Pulse Ox


 


 97.5 F L  84   22   145/71   100 


 


 05/26/18 10:00  05/26/18 10:00  05/26/18 10:00  05/26/18 10:00  05/26/18 08:00








 CBC, BMP





 05/26/18 05:50 





 05/26/18 05:50 





 Current Medications











Generic Name Dose Route Start Last Admin





  Trade Name Freq  PRN Reason Stop Dose Admin


 


Acetaminophen/Codeine Phosphate  1 tab  05/24/18 12:04  





  Tylenol # 3 -  PO   





  Q4H PRN   





  PAIN LEVEL 4 - 6   





     





     





     


 


Amlodipine Besylate  5 mg  05/24/18 10:00  05/26/18 09:13





  Norvasc -  PO   Not Given





  DAILY ANTONIO   





     





     





     





     


 


Chlorhexidine Gluconate  1 applic  05/23/18 22:00  05/25/18 21:25





  Hibiclens For Decolonization -  TP   1 applic





  HS ANTONIO   Administration





     





     





     





     


 


Dexamethasone Sodium Phosphate  4 mg  05/23/18 12:30  05/26/18 05:52





  Decadron Injection -  IVPUSH   4 mg





  Q6H ANTONIO   Administration





     





     





     





     


 


Docusate Sodium  100 mg  05/24/18 14:00  05/26/18 05:49





  Colace -  PO   Not Given





  TID ANTONIO   





     





     





     





     


 


Fentanyl  1 patch  05/25/18 12:00  05/25/18 12:20





  Duragesic 25mcg Patch -  TD  05/28/18 11:59  1 patch





  Q72H [facility]TONIO   Administration





     





     





     





     


 


Fentanyl  50 mcg  05/26/18 10:55  





  Sublimaze Injection -  IVPUSH  05/26/18 16:14  





  Q4H PRN   





  PAIN LEVEL 7 - 10   





     





     





     


 


Hydralazine HCl  10 mg  05/25/18 07:27  





  Apresoline Injection -  IVPUSH   





  Q6H PRN   





  HYPERTENSION   





     





     





     


 


Sodium Chloride  1,000 mls @ 75 mls/hr  05/26/18 11:15  





  Normal Saline -  IV   





  ASDIR [facility]IO   





     





     





     





     


 


Lorazepam  1 mg  05/24/18 16:53  05/25/18 21:37





  Ativan Injection -  IVPUSH   1 mg





  Q6H PRN   Administration





  ANXIETY   





     





     





     


 


Miscellaneous  1 each  05/26/18 10:30  





  Duragesic Patch Waste  TD   





  PRN PRN   





  PAIN   





     





     





     


 


Mupirocin  1 applic  05/23/18 10:00  05/26/18 09:12





  Bactroban Ointment (For Decolonization) -  NS  05/28/18 09:59  1 applic





  BID ANTONIO   Administration





     





     





     





     


 


Nystatin  500,000 units  05/23/18 12:00  05/26/18 05:49





  Nystatin Oral Suspension -  PO   500,000 units





  Q6HPO [facility]IO   Administration





     





     





     





     


 


Ondansetron HCl  8 mg  05/23/18 14:46  05/24/18 08:36





  Zofran Injection  IVPB   8 mg





  Q8H PRN   Administration





  NAUSEA   





     





     





     


 


Prochlorperazine Edisylate  10 mg  05/23/18 10:56  





  Compazine Injection -  IVPB   





  Q4H PRN   





  NAUSEA AND/OR VOMITING   





     





     





     











advanced TNBC. 


LMD


HTN 





-for pain control 


-appreciate RT c.s 


-Pal care note reviewed, pt DNR/DNI


-for further IT tx pending clinical status and further discussions.


-HypoNa- consider renal c/s 


-d/wRN

## 2018-05-27 LAB
ANION GAP SERPL CALC-SCNC: 8 MMOL/L (ref 8–16)
BASOPHILS # BLD: 0.2 % (ref 0–2)
BUN SERPL-MCNC: 13 MG/DL (ref 7–18)
CALCIUM SERPL-MCNC: 8 MG/DL (ref 8.5–10.1)
CHLORIDE SERPL-SCNC: 97 MMOL/L (ref 98–107)
CO2 SERPL-SCNC: 27 MMOL/L (ref 21–32)
CREAT SERPL-MCNC: 0.4 MG/DL (ref 0.55–1.02)
DEPRECATED RDW RBC AUTO: 17.9 % (ref 11.6–15.6)
EOSINOPHIL # BLD: 0.2 % (ref 0–4.5)
GLUCOSE SERPL-MCNC: 170 MG/DL (ref 74–106)
HCT VFR BLD CALC: 37 % (ref 32.4–45.2)
HGB BLD-MCNC: 12.3 GM/DL (ref 10.7–15.3)
LYMPHOCYTES # BLD: 3.3 % (ref 8–40)
MCH RBC QN AUTO: 34.8 PG (ref 25.7–33.7)
MCHC RBC AUTO-ENTMCNC: 33.2 G/DL (ref 32–36)
MCV RBC: 104.8 FL (ref 80–96)
MONOCYTES # BLD AUTO: 14.4 % (ref 3.8–10.2)
NEUTROPHILS # BLD: 81.9 % (ref 42.8–82.8)
PLATELET # BLD AUTO: 180 K/MM3 (ref 134–434)
PLATELET BLD QL SMEAR: ADEQUATE
PMV BLD: 7 FL (ref 7.5–11.1)
POTASSIUM SERPLBLD-SCNC: 3.6 MMOL/L (ref 3.5–5.1)
RBC # BLD AUTO: 3.53 M/MM3 (ref 3.6–5.2)
SODIUM SERPL-SCNC: 132 MMOL/L (ref 136–145)
WBC # BLD AUTO: 15.1 K/MM3 (ref 4–10)

## 2018-05-27 RX ADMIN — PIPERACILLIN SODIUM,TAZOBACTAM SODIUM SCH MLS/HR: 3; .375 INJECTION, POWDER, FOR SOLUTION INTRAVENOUS at 17:26

## 2018-05-27 RX ADMIN — SODIUM CHLORIDE SCH MLS/HR: 9 INJECTION, SOLUTION INTRAVENOUS at 01:34

## 2018-05-27 RX ADMIN — CHLORHEXIDINE GLUCONATE SCH APPLIC: 213 SOLUTION TOPICAL at 21:09

## 2018-05-27 RX ADMIN — DOCUSATE SODIUM SCH MG: 100 CAPSULE, LIQUID FILLED ORAL at 06:14

## 2018-05-27 RX ADMIN — DOCUSATE SODIUM SCH: 100 CAPSULE, LIQUID FILLED ORAL at 00:27

## 2018-05-27 RX ADMIN — DEXAMETHASONE SODIUM PHOSPHATE SCH MG: 4 INJECTION, SOLUTION INTRAMUSCULAR; INTRAVENOUS at 12:40

## 2018-05-27 RX ADMIN — PIPERACILLIN SODIUM,TAZOBACTAM SODIUM SCH MLS/HR: 3; .375 INJECTION, POWDER, FOR SOLUTION INTRAVENOUS at 01:33

## 2018-05-27 RX ADMIN — PIPERACILLIN SODIUM,TAZOBACTAM SODIUM SCH MLS/HR: 3; .375 INJECTION, POWDER, FOR SOLUTION INTRAVENOUS at 09:52

## 2018-05-27 RX ADMIN — DOCUSATE SODIUM SCH: 100 CAPSULE, LIQUID FILLED ORAL at 13:07

## 2018-05-27 RX ADMIN — DOCUSATE SODIUM SCH: 100 CAPSULE, LIQUID FILLED ORAL at 07:14

## 2018-05-27 RX ADMIN — DEXAMETHASONE SODIUM PHOSPHATE SCH MG: 4 INJECTION, SOLUTION INTRAMUSCULAR; INTRAVENOUS at 00:09

## 2018-05-27 RX ADMIN — NYSTATIN SCH UNITS: 100000 SUSPENSION ORAL at 06:14

## 2018-05-27 RX ADMIN — NYSTATIN SCH UNITS: 100000 SUSPENSION ORAL at 12:00

## 2018-05-27 RX ADMIN — SODIUM CHLORIDE SCH MLS/HR: 9 INJECTION, SOLUTION INTRAVENOUS at 14:39

## 2018-05-27 RX ADMIN — DEXAMETHASONE SODIUM PHOSPHATE SCH MG: 4 INJECTION, SOLUTION INTRAMUSCULAR; INTRAVENOUS at 06:14

## 2018-05-27 RX ADMIN — AMLODIPINE BESYLATE SCH MG: 5 TABLET ORAL at 09:52

## 2018-05-27 RX ADMIN — NYSTATIN SCH UNITS: 100000 SUSPENSION ORAL at 00:09

## 2018-05-27 RX ADMIN — MUPIROCIN SCH APPLIC: 20 OINTMENT TOPICAL at 21:09

## 2018-05-27 RX ADMIN — NYSTATIN SCH UNITS: 100000 SUSPENSION ORAL at 23:56

## 2018-05-27 RX ADMIN — DEXAMETHASONE SODIUM PHOSPHATE SCH MG: 4 INJECTION, SOLUTION INTRAMUSCULAR; INTRAVENOUS at 17:32

## 2018-05-27 RX ADMIN — DOCUSATE SODIUM SCH: 100 CAPSULE, LIQUID FILLED ORAL at 21:09

## 2018-05-27 RX ADMIN — NYSTATIN SCH UNITS: 100000 SUSPENSION ORAL at 17:27

## 2018-05-27 RX ADMIN — SODIUM CHLORIDE SCH: 9 INJECTION, SOLUTION INTRAVENOUS at 11:15

## 2018-05-27 RX ADMIN — MUPIROCIN SCH APPLIC: 20 OINTMENT TOPICAL at 09:52

## 2018-05-27 RX ADMIN — DEXAMETHASONE SODIUM PHOSPHATE SCH MG: 4 INJECTION, SOLUTION INTRAMUSCULAR; INTRAVENOUS at 23:56

## 2018-05-27 NOTE — PN
Progress Note, Physician





- Current Medication List


Current Medications: 


Active Medications





Acetaminophen/Codeine Phosphate (Tylenol # 3 -)  1 tab PO Q4H PRN


   PRN Reason: PAIN LEVEL 4 - 6


Amlodipine Besylate (Norvasc -)  5 [facility] PO [facility]ILY [facility]


   Last Admin: 05/27/18 09:52 Dose:  5 mg


Chlorhexidine Gluconate (Hibiclens For Decolonization -)  1 applic TP HS [facility]


   Last Admin: 05/26/18 22:00 Dose:  1 applic


Dexamethasone Sodium Phosphate (Decadron Injection -)  4 mg IVPUSH Q6H [facility]


   Last Admin: 05/27/18 06:14 Dose:  4 mg


Docusate Sodium (Colace -)  100 mg PO TID [facility]


   Last Admin: 05/27/18 07:14 Dose:  Not Given


Fentanyl (Duragesic 25mcg Patch -)  1 patch TD Q72H [facility]


   Stop: 05/28/18 11:59


   Last Admin: 05/25/18 12:20 Dose:  1 patch


Fentanyl (Sublimaze Injection -)  50 mcg IVPUSH Q4H PRN


   PRN Reason: PAIN LEVEL 6-10


   Stop: 05/27/18 20:29


   Last Admin: 05/27/18 06:44 Dose:  50 mcg


Hydralazine HCl (Apresoline Injection -)  10 mg IVPUSH Q6H PRN


   PRN Reason: HYPERTENSION


   Last Admin: 05/27/18 02:20 Dose:  10 mg


Sodium Chloride (Normal Saline -)  1,000 mls @ 75 mls/hr IV ASDIR [facility]


   Last Admin: 05/27/18 01:34 Dose:  75 mls/hr


Piperacillin Sod/Tazobactam (Sod 3.375 gm/ Dextrose)  50 mls @ 100 mls/hr IVPB 

Q8H-IV [facility]; Protocol


   Last Admin: 05/27/18 09:52 Dose:  100 mls/hr


Lorazepam (Ativan Injection -)  1 mg IVPUSH Q6H PRN


   PRN Reason: ANXIETY


   Last Admin: 05/26/18 11:09 Dose:  1 mg


Miscellaneous (Duragesic Patch Waste)  1 each TD PRN PRN


   PRN Reason: PAIN


Mupirocin (Bactroban Ointment (For Decolonization) -)  1 applic NS BID [facility]


   Stop: 05/28/18 09:59


   Last Admin: 05/27/18 09:52 Dose:  1 applic


Nystatin (Nystatin Oral Suspension -)  500,000 units PO Q6HPO [facility]


   Last Admin: 05/27/18 06:14 Dose:  500,000 units


Ondansetron HCl (Zofran Injection)  8 mg IVPB Q8H PRN


   PRN Reason: NAUSEA


   Last Admin: 05/24/18 08:36 Dose:  8 mg


Prochlorperazine Edisylate (Compazine Injection -)  10 mg IVPB Q4H PRN


   PRN Reason: NAUSEA AND/OR VOMITING











- Objective


Vital Signs: 


 Vital Signs











Temperature  98.1 F   05/27/18 08:00


 


Pulse Rate  93 H  05/27/18 08:00


 


Respiratory Rate  18   05/27/18 08:00


 


Blood Pressure  166/78   05/27/18 08:00


 


O2 Sat by Pulse Oximetry (%)  100   05/27/18 08:00











Eyes: Yes: WNL, Conjunctiva Clear, EOM Intact


HENT: Yes: WNL, Atraumatic, Normocephalic


Neck: Yes: WNL, Supple, Trachea Midline


Cardiovascular: Yes: WNL, Regular Rate and Rhythm


Respiratory: Yes: WNL, Regular, CTA Bilaterally


Gastrointestinal: Yes: WNL, Normal Bowel Sounds


Genitourinary: Yes: WNL


Musculoskeletal: Yes: WNL


Extremities: Yes: WNL


Edema: No


Integumentary: Yes: WNL


...Motor Strength: WNL


Psychiatric: Yes: WNL


Labs: 


 CBC, BMP





 05/27/18 06:05 





 05/27/18 06:05 





 INR, PTT











INR  1.05  (0.82-1.09)   05/24/18  05:25    














Assessment/Plan








IMP:


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 

receiving intrathecal chemo


HTN





REC:


Cont Amlodipine 5mg daily for goal BP < 150/90











coverage dr. Steen

## 2018-05-27 NOTE — PN
Progress Note (short form)





- Note


Progress Note: 





PULMONARY/CCM





Pt seen and examined in the ICU. s/p LP yesterday with some improvement in 

mental status. Still some headache. Falls asleep easily.





 Vital Signs











 Period  Temp  Pulse  Resp  BP Sys/Zhao  Pulse Ox


 


 Last 24 Hr  97.5 F-98.3 F    16-22  113-166/50-92  100-100








 Intake & Output











 05/24/18 05/25/18 05/26/18 05/27/18





 23:59 23:59 23:59 23:59


 


Intake Total 2785 1800 1570 1000


 


Output Total 3785 950 2900 400


 


Balance -1000 850 -1330 600


 


Weight 93.44 kg  91.399 kg 90.775 kg








Gen:  somnolent but arousable


Heart: RRR


Lung: decreased breath sounds at the bases


Abd: soft, nontender


Ext: no edema





 CBC, BMP





 05/27/18 06:05 





 05/27/18 06:05 





Active Medications





Acetaminophen/Codeine Phosphate (Tylenol # 3 -)  1 tab PO Q4H PRN


   PRN Reason: PAIN LEVEL 4 - 6


Amlodipine Besylate (Norvasc -)  5 mg PO DAILY [facility]


   Last Admin: 05/26/18 09:13 Dose:  Not Given


Chlorhexidine Gluconate (Hibiclens For Decolonization -)  1 applic TP HS [facility]


   Last Admin: 05/26/18 22:00 Dose:  1 applic


Dexamethasone Sodium Phosphate (Decadron Injection -)  4 mg IVPUSH Q6H [facility]


   Last Admin: 05/27/18 06:14 Dose:  4 mg


Docusate Sodium (Colace -)  100 mg PO TID [facility]


   Last Admin: 05/27/18 07:14 Dose:  Not Given


Fentanyl (Duragesic 25mcg Patch -)  1 patch TD Q72H [facility]


   Stop: 05/28/18 11:59


   Last Admin: 05/25/18 12:20 Dose:  1 patch


Fentanyl (Sublimaze Injection -)  50 mcg IVPUSH Q4H PRN


   PRN Reason: PAIN LEVEL 6-10


   Stop: 05/27/18 20:29


   Last Admin: 05/27/18 06:44 Dose:  50 mcg


Hydralazine HCl (Apresoline Injection -)  10 mg IVPUSH Q6H PRN


   PRN Reason: HYPERTENSION


   Last Admin: 05/27/18 02:20 Dose:  10 mg


Sodium Chloride (Normal Saline -)  1,000 mls @ 75 mls/hr IV ASDIR ANTONIO


   Last Admin: 05/27/18 01:34 Dose:  75 mls/hr


Piperacillin Sod/Tazobactam (Sod 3.375 gm/ Dextrose)  50 mls @ 100 mls/hr IVPB 

Q8H-IV ANTONIO; Protocol


   Last Admin: 05/27/18 01:33 Dose:  100 mls/hr


Lorazepam (Ativan Injection -)  1 mg IVPUSH Q6H PRN


   PRN Reason: ANXIETY


   Last Admin: 05/26/18 11:09 Dose:  1 mg


Miscellaneous (Duragesic Patch Waste)  1 each TD PRN PRN


   PRN Reason: PAIN


Mupirocin (Bactroban Ointment (For Decolonization) -)  1 applic NS BID [facility]


   Stop: 05/28/18 09:59


   Last Admin: 05/26/18 22:10 Dose:  1 applic


Nystatin (Nystatin Oral Suspension -)  500,000 units PO Q6HPO [facility]


   Last Admin: 05/27/18 06:14 Dose:  500,000 units


Ondansetron HCl (Zofran Injection)  8 mg IVPB Q8H PRN


   PRN Reason: NAUSEA


   Last Admin: 05/24/18 08:36 Dose:  8 mg


Prochlorperazine Edisylate (Compazine Injection -)  10 mg IVPB Q4H PRN


   PRN Reason: NAUSEA AND/OR VOMITING





A/P


Hypertensive Urgency


Metastatic Breast Ca 


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


Bacteruria





-  pain control


-  BP control


-  intrathecal chemo per onc


-  continue decadron


-  antiemetics


-  pain control


-  DVT prophylaxis


-  goals of care discussions ongoing

## 2018-05-27 NOTE — PN
Progress Note (short form)





- Note


Progress Note: 





NEUROSURGERY 





POD #3





In ICU


 at bedside


S/p LP yesterday per Dr Stanford (?about 40 cc)


Less H/A


PE: AF, VSS


Less drowsy, a little more verbal


HEENT- NC/AT; Neck- supple; Cor- RRR; Lungs- CTA B;; Abd- benign; Ext- no sign 

of DVT


CN- PERRL; Motor- 4- B UE/LE; Sensation- difficult to assess; DTR- hyporeflexic


Stage IV triple negative breast CA 


Given first dose of IT MTX on Thursday, contemplate second dose tomorrow 


Shunt dubious in face of leptomeningeal disease and Ommaya not amendable to 

higher volume CSF withdraw


Care d/w Dr Stanford yesterday 


Prognosis poor

## 2018-05-27 NOTE — PN
Progress Note (short form)





- Note


Progress Note: 


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 

(s/p genzar and carboplatin) diagnosed 2017 now on taxol, mammary carcinoma 

of lobular phenotype and LCIS with lymphovascular invasion (currently on 

chemotherapy and sees Dr. Romero), IBD, GERD, RLE thrombophlebitis, and 

osteoasthritis presented to Dr. Romero office for x1 week of headache and nausea

, vertigo and diarrhea/indigestion. Pt would have recieved her 5th dose today, 

she started about 1 month ago. 


mild left sided HA -- ? double vision though not an issue now. Sees optho- ? 

cataract L eye. 


no focal weakness, numbness, slurred speech. 





MRI reviewed. 


MRI/BRAIN MRI W W/O CONTRAST : Upon further review of the images, there is 

faint focal linear-like increased T2 signal intensity in superior aspect of the 

right cerebellum that appears to be enhancing on the postcontrast examination 

measuring approximately 11 mm in anterior-posterior length and 7 mm in width 

suggestive of focal cortical enhancement. There is also leptomeningeal 

enhancement within the superior cerebellar folia, bilaterally. There is 

leptomeningeal enhancement along posterior margin of the clivus as well as 

dural enhancement in the included portion of the upper cervical spine, mainly 

anteriorly up to upper C3 level. 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 

carcinomatosis, considering the clinical history of breast cancer with focal 

cortical involvement in the right cerebellum, superiorly. Please correlate with 

CSF fluid analysis and close follow-up MRI is recommended. Case discussed with 

Dr. Gabriel Verde and Dr. Edgar Romero, caring attending physician





FU : 





s/p OMAYA given methotrexate, has been lethargic 


barely answers name, was c/o of HA --18


s/p LP yesterday for presumed inc ICP-- as per nurse more awake , c/o less HA 


this AM , awakens and answers basic questions, does state HA less , though she 

feels sleepy


ABX restarted


met with palliative care  





- Past Medical History


Gastrointestinal: Yes: GERD, Irritable Bowel Disease


Musculoskeletal: Yes: Osteoarthritis


Additional Medical History: H/O thrombophlebitis right LE





- Alcohol/Substance Use


Hx Alcohol Use: No


History of Substance Use: reports: None





- Smoking History


Smoking history: Never smoked


Have you smoked in the past 12 months: No





- Social History


ADL: Independent


Occupation: [employer] nurse 


History of Recent Travel: No





Home Medications





- Allergies


Allergies/Adverse Reactions: 


 Allergies











Allergy/AdvReac Type Severity Reaction Status Date / Time


 


adhesive tape Allergy  Rash Verified 18 10:23


 


No Known Drug Allergies Allergy   Verified 18 10:23














- Home Medications


Home Medications: 


Ambulatory Orders





Multivitamins [Tab-A-Vit -] 1 tab PO DAILY 17 


Ranitidine [Zantac -] 150 mg PO BID 17 


Vitamin C 500 mg PO DAILY 17 


Vitamin D - 1,000 unit PO DAILY 17 


Oxycodone HCl/Acetaminophen [Endocet 5-325 Tablet] 1 each PO Q8H PRN MDD 3  


Dexamethasone [Decadron] 4 mg PO TID 18 


Inderal - 40 mg PO DAILY 18 


Magnesium Oxide 500 mg PO BID 18 











Family Disease History





- Family Disease History


Family Disease History: CA: Grandparent (mat GM breast ca 50  57), 

Father (panceratic ca 82), Mother (CRC 46  57), Sister (mutiple meyloma)





Physical Exam-Neuro


Vital Signs: 


 


  


  Vital Signs











Temperature  98.2 F   18 06:00


 


Pulse Rate  90   18 06:00


 


Respiratory Rate  18   18 06:00


 


Blood Pressure  149/67   18 06:00


 


O2 Sat by Pulse Oximetry (%)  100   18 22:00











Labs: 


  


 CBCD











WBC  15.1 K/mm3 (4.0-10.0)  H  18  06:05    


 


RBC  3.53 M/mm3 (3.60-5.2)  L  18  06:05    


 


Hgb  12.3 GM/dL (10.7-15.3)   18  06:05    


 


Hct  37.0 % (32.4-45.2)   18  06:05    


 


MCV  104.8 fl (80-96)  H  18  06:05    


 


MCHC  33.2 g/dl (32.0-36.0)   18  06:05    


 


RDW  17.9 % (11.6-15.6)  H  18  06:05    


 


Plt Count  180 K/MM3 (134-434)   18  06:05    


 


MPV  7.0 fl (7.5-11.1)  L  18  06:05    








 CMP











Sodium  132 mmol/L (136-145)  L  18  06:05    


 


Potassium  3.6 mmol/L (3.5-5.1)   18  06:05    


 


Chloride  97 mmol/L ()  L  18  06:05    


 


Carbon Dioxide  27 mmol/L (21-32)   18  06:05    


 


Anion Gap  8  (8-16)   18  06:05    


 


BUN  13 mg/dL (7-18)   18  06:05    


 


Creatinine  0.4 mg/dL (0.55-1.02)  L  18  06:05    


 


Creat Clearance w eGFR  > 60  (>60)   18  05:50    


 


Calcium  8.0 mg/dL (8.5-10.1)  L  18  06:05    


 


Total Bilirubin  0.6 mg/dL (0.2-1.0)  D 18  05:50    


 


AST  60 U/L (15-37)  H  18  05:50    


 


ALT  35 U/L (12-78)   18  05:50    


 


Alkaline Phosphatase  89 U/L ()   18  05:50    


 


Total Protein  6.4 g/dl (6.4-8.2)   18  05:50    


 


Albumin  3.2 g/dl (3.4-5.0)  L  18  05:50    

















- Neuro Exam


Level Of Consciousness: Yes: Alert, Oriented to Person (EOMI, min esotropia L 

eye, slight aniscoria L >R --constrict, no facial, motor 5/5, reflexes 

symmetric )





Imaging





- Results


MRI: Report Reviewed, Image Reviewed





Problem List





- Problems


(1) Breast cancer metastasized to bone


Code(s): C50.919 - MALIGNANT NEOPLASM OF UNSP SITE OF UNSPECIFIED FEMALE BREAST

; C79.51 - SECONDARY MALIGNANT NEOPLASM OF BONE   


Qualifiers: 


   Laterality: right   Qualified Code(s): C50.911 - Malignant neoplasm of 

unspecified site of right female breast; C79.51 - Secondary malignant neoplasm 

of bone; C79.51 - Secondary malignant neoplasm of bone; C79.51 - Secondary 

malignant neoplasm of bone; C79.51 - Secondary malignant neoplasm of bone   





(2) Headache


Code(s): R51 - HEADACHE   


Qualifiers: 


   Headache type: unspecified   Headache chronicity pattern: unspecified 

pattern   Intractability: intractable   Qualified Code(s): R51 - Headache   





(3) Leptomeningeal metastases


Code(s): C79.49 - SECONDARY MALIGNANT NEOPLASM OF OTH PARTS OF NERVOUS SYSTEM   





(4) Nausea


Code(s): R11.0 - NAUSEA   





Assessment/Plan


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 

(s/p genzar and carboplatin) diagnosed 2017 now on taxol, mammary carcinoma 

of lobular phenotype and LCIS with lymphovascular invasion , HX of focal RT , (

currently on chemotherapy and sees Dr. Romero), IBD, GERD, RLE thrombophlebitis, 

and osteoasthritis presented to Dr. Romero office for x1-2 week of headache and 

nausea, vertigo and diarrhea/indigestion.


MRI shows ? enhancement R cerebellar area/eptomeningeal disease --


S/P OMAYA with progressive obtundation/lethargy --concerning for increased ICP





spoke to [facility] and neurosurgery -- 


LP done on  to reduce inc ICP , some improvement in MS, will get HD CT today

, and to decipher any role in shunt 


no signs meningitis 





palliative care discussions P 








DR Stanford 








Problem List





- Problems


(1) Breast cancer metastasized to bone


Code(s): C50.919 - MALIGNANT NEOPLASM OF UNSP SITE OF UNSPECIFIED FEMALE BREAST

; C79.51 - SECONDARY MALIGNANT NEOPLASM OF BONE   


Qualifiers: 


   Laterality: right   Qualified Code(s): C50.911 - Malignant neoplasm of 

unspecified site of right female breast; C79.51 - Secondary malignant neoplasm 

of bone; C79.51 - Secondary malignant neoplasm of bone; C79.51 - Secondary 

malignant neoplasm of bone; C79.51 - Secondary malignant neoplasm of bone   





(2) Headache


Code(s): R51 - HEADACHE   


Qualifiers: 


   Headache type: unspecified   Headache chronicity pattern: unspecified 

pattern   Intractability: intractable   Qualified Code(s): R51 - Headache   





(3) Leptomeningeal metastases


Code(s): C79.49 - SECONDARY MALIGNANT NEOPLASM OF OTH PARTS OF NERVOUS SYSTEM   





(4) Nausea


Code(s): R11.0 - NAUSEA

## 2018-05-27 NOTE — PN
Progress Note, Physician


History of Present Illness: 





patient post lp


much better today


still falls asleep 


more responsive


wbc trending down





- Current Medication List


Current Medications: 


Active Medications





Acetaminophen/Codeine Phosphate (Tylenol # 3 -)  1 tab PO Q4H PRN


   PRN Reason: PAIN LEVEL 4 - 6


Amlodipine Besylate (Norvasc -)  5 mg PO DAILY [facility]


   Last Admin: 05/27/18 09:52 Dose:  5 mg


Chlorhexidine Gluconate (Hibiclens For Decolonization -)  1 applic TP HS [facility]


   Last Admin: 05/26/18 22:00 Dose:  1 applic


Dexamethasone Sodium Phosphate (Decadron Injection -)  4 mg IVPUSH Q6H [facility]


   Last Admin: 05/27/18 06:14 Dose:  4 mg


Docusate Sodium (Colace -)  100 mg PO TID [facility]


   Last Admin: 05/27/18 07:14 Dose:  Not Given


Fentanyl (Duragesic 25mcg Patch -)  1 patch TD Q72H [facility]


   Stop: 05/28/18 11:59


   Last Admin: 05/25/18 12:20 Dose:  1 patch


Fentanyl (Sublimaze Injection -)  50 mcg IVPUSH Q4H PRN


   PRN Reason: PAIN LEVEL 6-10


   Stop: 05/27/18 20:29


   Last Admin: 05/27/18 06:44 Dose:  50 mcg


Hydralazine HCl (Apresoline Injection -)  10 mg IVPUSH Q6H PRN


   PRN Reason: HYPERTENSION


   Last Admin: 05/27/18 02:20 Dose:  10 mg


Sodium Chloride (Normal Saline -)  1,000 mls @ 75 mls/hr IV ASDIR [facility]


   Last Admin: 05/27/18 01:34 Dose:  75 mls/hr


Piperacillin Sod/Tazobactam (Sod 3.375 gm/ Dextrose)  50 mls @ 100 mls/hr IVPB 

Q8H-IV ANTONIO; Protocol


   Last Admin: 05/27/18 09:52 Dose:  100 mls/hr


Lorazepam (Ativan Injection -)  1 mg IVPUSH Q6H PRN


   PRN Reason: ANXIETY


   Last Admin: 05/26/18 11:09 Dose:  1 mg


Miscellaneous (Duragesic Patch Waste)  1 each TD PRN PRN


   PRN Reason: PAIN


Mupirocin (Bactroban Ointment (For Decolonization) -)  1 applic NS BID [facility]


   Stop: 05/28/18 09:59


   Last Admin: 05/27/18 09:52 Dose:  1 applic


Nystatin (Nystatin Oral Suspension -)  500,000 units PO Q6HPO [facility]


   Last Admin: 05/27/18 06:14 Dose:  500,000 units


Ondansetron HCl (Zofran Injection)  8 mg IVPB Q8H PRN


   PRN Reason: NAUSEA


   Last Admin: 05/24/18 08:36 Dose:  8 mg


Prochlorperazine Edisylate (Compazine Injection -)  10 mg IVPB Q4H PRN


   PRN Reason: NAUSEA AND/OR VOMITING











- Objective


Vital Signs: 


 Vital Signs











Temperature  98.0 F   05/27/18 10:00


 


Pulse Rate  99 H  05/27/18 10:00


 


Respiratory Rate  17   05/27/18 10:00


 


Blood Pressure  145/49   05/27/18 10:00


 


O2 Sat by Pulse Oximetry (%)  100   05/27/18 08:00











Constitutional: Yes: Calm


Cardiovascular: Yes: Regular Rate and Rhythm


Respiratory: Yes: Regular, CTA Bilaterally


Gastrointestinal: Yes: Normal Bowel Sounds, Soft


Musculoskeletal: Yes: WNL


Extremities: Yes: WNL


Wound/Incision: Yes: Dressing Dry and Intact


Neurological: Yes: Alert


Psychiatric: Yes: Alert


Labs: 


 CBC, BMP





 05/27/18 06:05 





 05/27/18 06:05 





 INR, PTT











INR  1.05  (0.82-1.09)   05/24/18  05:25    














- ....Imaging


Cat Scan: Report Reviewed, Image Reviewed





Assessment/Plan














Metastatic Breast Ca 


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


leukocytosis


electrolyte abn


uti


confusion





plan





post op from Ommaya shunt placement 


monitor closely


continue abx


stilla waiting for identification of the organism


rest continue as per icu


monitor bp and mental status











cc time 40 min

## 2018-05-27 NOTE — PN
Physical Exam: 


SUBJECTIVE: Patient seen and examined in ICU. HA remains, some MS improved 

after LP. 








OBJECTIVE:





 Vital Signs











 Period  Temp  Pulse  Resp  BP Sys/Zhao  Pulse Ox


 


 Last 24 Hr  97.9 F-98.3 F    16-20  113-167/49-88  100-100











PE:


General: Malaise, arousable 


HEENT: ommaya port placed, dressing cdi 


Pulm: diminished, no resp distress 


CV: s1 s2 rrr


Abd: soft, nd 


Ext: trace le edema 





 Laboratory Results - last 24 hr











  05/26/18 05/26/18 05/27/18





  14:00 14:00 06:05


 


WBC   


 


RBC   


 


Hgb   


 


Hct   


 


MCV   


 


MCH   


 


MCHC   


 


RDW   


 


Plt Count   


 


MPV   


 


Neutrophils %   


 


Neutrophils % (Manual)   


 


Band Neutrophils %   


 


Lymphocytes %   


 


Lymphocytes % (Manual)   


 


Monocytes %   


 


Monocytes % (Manual)   


 


Eosinophils %   


 


Basophils %   


 


Nucleated RBC %   


 


Metamyelocytes   


 


Platelet Estimate   


 


Platelet Comment   


 


Sodium    132 L


 


Potassium    3.6


 


Chloride    97 L


 


Carbon Dioxide    27


 


Anion Gap    8


 


BUN    13


 


Creatinine    0.4 L


 


Random Glucose    170 H


 


Calcium    8.0 L


 


CSF Appearance   Clear 


 


CSF Color   Colorless 


 


CSF WBC   4 


 


CSF RBC   4 


 


CSF Neutrophils   No Result Required. 


 


CSF Lymphocytes   No Result Required. 


 


CSF Eosinophils   No Result Required. 


 


CSF Basophils   No Result Required. 


 


CSF Macrophages   No Result Required. 


 


CSF Plasma Cells   No Result Required. 


 


CSF Diff Comment   No Result Required. 


 


CSF Comment   No Result Required. 


 


CSF Glucose  Cancelled  55 


 


CSF Total Protein   145 H 














  05/27/18





  06:05


 


WBC  15.1 H


 


RBC  3.53 L


 


Hgb  12.3


 


Hct  37.0


 


MCV  104.8 H


 


MCH  34.8 H


 


MCHC  33.2


 


RDW  17.9 H


 


Plt Count  180


 


MPV  7.0 L


 


Neutrophils %  81.9


 


Neutrophils % (Manual)  86.0 H


 


Band Neutrophils %  7.0


 


Lymphocytes %  3.3 L D


 


Lymphocytes % (Manual)  4.0 L


 


Monocytes %  14.4 H


 


Monocytes % (Manual)  2 L D


 


Eosinophils %  0.2  D


 


Basophils %  0.2


 


Nucleated RBC %  0


 


Metamyelocytes  1  D


 


Platelet Estimate  Adequate


 


Platelet Comment  No clumping noted


 


Sodium 


 


Potassium 


 


Chloride 


 


Carbon Dioxide 


 


Anion Gap 


 


BUN 


 


Creatinine 


 


Random Glucose 


 


Calcium 


 


CSF Appearance 


 


CSF Color 


 


CSF WBC 


 


CSF RBC 


 


CSF Neutrophils 


 


CSF Lymphocytes 


 


CSF Eosinophils 


 


CSF Basophils 


 


CSF Macrophages 


 


CSF Plasma Cells 


 


CSF Diff Comment 


 


CSF Comment 


 


CSF Glucose 


 


CSF Total Protein 








Active Medications











Generic Name Dose Route Start Last Admin





  Trade Name Freq  PRN Reason Stop Dose Admin


 


Amlodipine Besylate  5 mg  05/24/18 10:00  05/27/18 09:52





  Norvasc -  PO   5 mg





  DAILY ANTONIO   Administration





     





     





     





     


 


Chlorhexidine Gluconate  1 applic  05/23/18 22:00  05/26/18 22:00





  Hibiclens For Decolonization -  TP   1 applic





  HS ANTONIO   Administration





     





     





     





     


 


Dexamethasone Sodium Phosphate  4 mg  05/23/18 12:30  05/27/18 12:40





  Decadron Injection -  IVPUSH   4 mg





  Q6H ANTONIO   Administration





     





     





     





     


 


Docusate Sodium  100 mg  05/24/18 14:00  05/27/18 13:07





  Colace -  PO   Not Given





  TID ANTONIO   





     





     





     





     


 


Fentanyl  1 patch  05/25/18 12:00  05/25/18 12:20





  Duragesic 25mcg Patch -  TD  05/28/18 11:59  1 patch





  Q72H ANTONIO   Administration





     





     





     





     


 


Fentanyl  50 mcg  05/26/18 20:20  05/27/18 06:44





  Sublimaze Injection -  IVPUSH  05/27/18 20:29  50 mcg





  Q4H PRN   Administration





  PAIN LEVEL 6-10   





     





     





     


 


Hydralazine HCl  10 mg  05/25/18 07:27  05/27/18 02:20





  Apresoline Injection -  IVPUSH   10 mg





  Q6H PRN   Administration





  HYPERTENSION   





     





     





     


 


Sodium Chloride  1,000 mls @ 75 mls/hr  05/26/18 11:15  05/27/18 11:15





  Normal Saline -  IV   Not Given





  ASDIR ANTONIO   





     





     





     





     


 


Piperacillin Sod/Tazobactam  50 mls @ 100 mls/hr  05/26/18 13:30  05/27/18 09:52





  Sod 3.375 gm/ Dextrose  IVPB   100 mls/hr





  Q8H-IV ANTONIO   Administration





     





     





  Protocol   





     


 


Lorazepam  1 mg  05/24/18 16:53  05/26/18 11:09





  Ativan Injection -  IVPUSH   1 mg





  Q6H PRN   Administration





  ANXIETY   





     





     





     


 


Miscellaneous  1 each  05/26/18 10:30  





  Duragesic Patch Waste  TD   





  PRN PRN   





  PAIN   





     





     





     


 


Mupirocin  1 applic  05/23/18 10:00  05/27/18 09:52





  Bactroban Ointment (For Decolonization) -  NS  05/28/18 09:59  1 applic





  BID ANTONIO   Administration





     





     





     





     


 


Nystatin  500,000 units  05/23/18 12:00  05/27/18 12:00





  Nystatin Oral Suspension -  PO   500,000 units





  Q6HPO ANTONIO   Administration





     





     





     





     


 


Ondansetron HCl  8 mg  05/23/18 14:46  05/24/18 08:36





  Zofran Injection  IVPB   8 mg





  Q8H PRN   Administration





  NAUSEA   





     





     





     


 


Prochlorperazine Edisylate  10 mg  05/23/18 10:56  





  Compazine Injection -  IVPB   





  Q4H PRN   





  NAUSEA AND/OR VOMITING   





     





     





     








 Microbiology





05/23/18 12:30   Blood - Peripheral Venous   Blood Culture - Preliminary


                            NO GROWTH OBTAINED AFTER 96 HOURS, INCUBATION TO 

CONTINUE


                            FOR 1 DAYS.


05/23/18 08:08   Blood - Karri Cath   Blood Culture - Preliminary


                            NO GROWTH OBTAINED AFTER 96 HOURS, INCUBATION TO 

CONTINUE


                            FOR 1 DAYS.


05/24/18 12:35   Cerebral Spinal Fluid - Ventricular Csf   Gram Stain - Final


05/24/18 12:35   Cerebral Spinal Fluid - Ventricular Csf   CSF Culture - Final


                            NO GROWTH AFTER 48 HOURS INCUBATION


05/23/18 12:15   Urine - Urine Clean Catch   Urine Culture - Final


                            Escherichia Coli


                            Pending Organism


05/18/18 08:30   Cerebral Spinal Fluid - Lumbar Puncture   Gram Stain - Final








Assessment: 67 year old female with PMHx of osteoarthritis, GERD, triple 

negative metastatic right breast cancer (bone) diagnosed 9/2017 admitted with 

headache, nausea, vertigo, and diarrhea/indigestion x1 week. 





Plan: 





1. HTN


- Elevated 


- Increase norvasc 10mg daily, goal SBP <150


- Hydralazine prn 





2. Metastatic right breast cancer


- With leptomeningeal involvement


- Repeat LP done at bedside yesterday 


- s/p Ommaya reservoir placement with x1 dose MXT 5/24  


- Methotrexate to be dosed x2/week, possible dose tomorrow 


- Continue Decadron


- D/w neuro, shunt unlikely option d/t seeding and ommaya unable to handle high 

vol csf withdrawal 


- Could can consider external spinal catheter for drainage vs symptomatic LP's


- Follow cytology 





3. Hyponatremia


- Improving 


- NS 75cc/hr





4. UTI, leukocytosis 


- Urine cx 2nd organism pending 


- Zosyn per ID 





5. DVT ppx 


- MARI stockings 





Problem List





- Problems


(1) Breast cancer metastasized to bone


Code(s): C50.919 - MALIGNANT NEOPLASM OF UNSP SITE OF UNSPECIFIED FEMALE BREAST

; C79.51 - SECONDARY MALIGNANT NEOPLASM OF BONE   


Qualifiers: 


   Laterality: right   Qualified Code(s): C50.911 - Malignant neoplasm of 

unspecified site of right female breast; C79.51 - Secondary malignant neoplasm 

of bone; C79.51 - Secondary malignant neoplasm of bone; C79.51 - Secondary 

malignant neoplasm of bone; C79.51 - Secondary malignant neoplasm of bone   





(2) Headache


Code(s): R51 - HEADACHE   


Qualifiers: 


   Headache type: unspecified   Headache chronicity pattern: unspecified 

pattern   Intractability: intractable   Qualified Code(s): R51 - Headache   





(3) Leptomeningeal metastases


Code(s): C79.49 - SECONDARY MALIGNANT NEOPLASM OF OTH PARTS OF NERVOUS SYSTEM   





(4) Nausea


Code(s): R11.0 - NAUSEA   





Visit type





- Emergency Visit


Emergency Visit: Yes


ED Registration Date: 05/17/18


Care time: The patient presented to the Emergency Department on the above date 

and was hospitalized for further evaluation of their emergent condition.





- New Patient


This patient is new to me today: No





- Critical Care


Critical Care patient: No

## 2018-05-27 NOTE — PN
Progress Note (short form)





- Note


Progress Note: 


Pt seen and examined





chart reviewed. 





Much appreciate 's consult. 





today, 


Jasonn is a little bit more awake and as per  at bedside she is more 

verbal. 





 


HEENT: less lethargic. 


Breasts: extensive erythema, nodularity chest wall right breast


Cor: RSR, No murmurs, No gallops


Abd: Soft, Normal bowel sounds, No organomegaly


Ext:No significant edema


Skin: No rashes, Integument intact





 Last Vital Signs











Temp Pulse Resp BP Pulse Ox


 


 98.0 F   85   20   164/77   100 


 


 05/27/18 14:00  05/27/18 14:00  05/27/18 14:00  05/27/18 14:00  05/27/18 08:00








 CBC, BMP





 05/27/18 06:05 





 05/27/18 06:05 





 Current Medications











Generic Name Dose Route Start Last Admin





  Trade Name Freq  PRN Reason Stop Dose Admin


 


Amlodipine Besylate  10 mg  05/27/18 14:15  





  Norvasc -  PO   





  DAILY ANTONIO   





     





     





     





     


 


Chlorhexidine Gluconate  1 applic  05/23/18 22:00  05/26/18 22:00





  Hibiclens For Decolonization -  TP   1 applic





  HS ANTONIO   Administration





     





     





     





     


 


Dexamethasone Sodium Phosphate  4 mg  05/23/18 12:30  05/27/18 12:40





  Decadron Injection -  IVPUSH   4 mg





  Q6H ANTONIO   Administration





     





     





     





     


 


Docusate Sodium  100 mg  05/24/18 14:00  05/27/18 13:07





  Colace -  PO   Not Given





  TID ANTONIO   





     





     





     





     


 


Fentanyl  1 patch  05/25/18 12:00  05/25/18 12:20





  Duragesic 25mcg Patch -  TD  05/28/18 11:59  1 patch





  Q72H ANTONIO   Administration





     





     





     





     


 


Fentanyl  50 mcg  05/26/18 20:20  05/27/18 14:34





  Sublimaze Injection -  IVPUSH  05/27/18 20:29  50 mcg





  Q4H PRN   Administration





  PAIN LEVEL 6-10   





     





     





     


 


Hydralazine HCl  10 mg  05/25/18 07:27  05/27/18 02:20





  Apresoline Injection -  IVPUSH   10 mg





  Q6H PRN   Administration





  HYPERTENSION   





     





     





     


 


Sodium Chloride  1,000 mls @ 75 mls/hr  05/26/18 11:15  05/27/18 14:39





  Normal Saline -  IV   75 mls/hr





  ASDIR ANTONIO   Administration





     





     





     





     


 


Piperacillin Sod/Tazobactam  50 mls @ 100 mls/hr  05/26/18 13:30  05/27/18 09:52





  Sod 3.375 gm/ Dextrose  IVPB   100 mls/hr





  Q8H-IV ANTONIO   Administration





     





     





  Protocol   





     


 


Lorazepam  1 mg  05/24/18 16:53  05/26/18 11:09





  Ativan Injection -  IVPUSH   1 mg





  Q6H PRN   Administration





  ANXIETY   





     





     





     


 


Miscellaneous  1 each  05/26/18 10:30  





  Duragesic Patch Waste  TD   





  PRN PRN   





  PAIN   





     





     





     


 


Mupirocin  1 applic  05/23/18 10:00  05/27/18 09:52





  Bactroban Ointment (For Decolonization) -  NS  05/28/18 09:59  1 applic





  BID ANTONIO   Administration





     





     





     





     


 


Nystatin  500,000 units  05/23/18 12:00  05/27/18 12:00





  Nystatin Oral Suspension -  PO   500,000 units





  Q6HPO ANTONIO   Administration





     





     





     





     


 


Ondansetron HCl  8 mg  05/23/18 14:46  05/24/18 08:36





  Zofran Injection  IVPB   8 mg





  Q8H PRN   Administration





  NAUSEA   





     





     





     


 


Prochlorperazine Edisylate  10 mg  05/23/18 10:56  





  Compazine Injection -  IVPB   





  Q4H PRN   





  NAUSEA AND/OR VOMITING   





     





     





     














advanced TNBC. 


LMD


HTN 





-for pain control 


-appreciate neuro c/s 


-anticipate second dose of IT MTX monday am


-HypoNa- improving 


-will c/w dex at present doses

## 2018-05-28 LAB
ALBUMIN SERPL-MCNC: 3 G/DL (ref 3.4–5)
ALP SERPL-CCNC: 105 U/L (ref 45–117)
ALT SERPL-CCNC: 71 U/L (ref 12–78)
ANION GAP SERPL CALC-SCNC: 8 MMOL/L (ref 8–16)
ANISOCYTOSIS BLD QL: (no result)
AST SERPL-CCNC: 39 U/L (ref 15–37)
BASOPHILS # BLD: 0.1 % (ref 0–2)
BILIRUB SERPL-MCNC: 0.6 MG/DL (ref 0.2–1)
BUN SERPL-MCNC: 11 MG/DL (ref 7–18)
CALCIUM SERPL-MCNC: 7.8 MG/DL (ref 8.5–10.1)
CHLORIDE SERPL-SCNC: 94 MMOL/L (ref 98–107)
CO2 SERPL-SCNC: 28 MMOL/L (ref 21–32)
CREAT SERPL-MCNC: 0.3 MG/DL (ref 0.55–1.02)
DEPRECATED RDW RBC AUTO: 17.8 % (ref 11.6–15.6)
EOSINOPHIL # BLD: 0 % (ref 0–4.5)
GLUCOSE SERPL-MCNC: 144 MG/DL (ref 74–106)
HCT VFR BLD CALC: 36.1 % (ref 32.4–45.2)
HGB BLD-MCNC: 12.1 GM/DL (ref 10.7–15.3)
LYMPHOCYTES # BLD: 4 % (ref 8–40)
MACROCYTES BLD QL: (no result)
MAGNESIUM SERPL-MCNC: 1.7 MG/DL (ref 1.8–2.4)
MCH RBC QN AUTO: 35.1 PG (ref 25.7–33.7)
MCHC RBC AUTO-ENTMCNC: 33.4 G/DL (ref 32–36)
MCV RBC: 105.1 FL (ref 80–96)
MONOCYTES # BLD AUTO: 10.1 % (ref 3.8–10.2)
NEUTROPHILS # BLD: 85.8 % (ref 42.8–82.8)
PHOSPHATE SERPL-MCNC: 1.9 MG/DL (ref 2.5–4.9)
PLATELET # BLD AUTO: 185 K/MM3 (ref 134–434)
PLATELET BLD QL SMEAR: ADEQUATE
PMV BLD: 7.2 FL (ref 7.5–11.1)
POTASSIUM SERPLBLD-SCNC: 3.6 MMOL/L (ref 3.5–5.1)
PROT SERPL-MCNC: 6 G/DL (ref 6.4–8.2)
RBC # BLD AUTO: 3.44 M/MM3 (ref 3.6–5.2)
SODIUM SERPL-SCNC: 130 MMOL/L (ref 136–145)
WBC # BLD AUTO: 16.8 K/MM3 (ref 4–10)

## 2018-05-28 RX ADMIN — DEXAMETHASONE SODIUM PHOSPHATE SCH MG: 4 INJECTION, SOLUTION INTRAMUSCULAR; INTRAVENOUS at 05:34

## 2018-05-28 RX ADMIN — DOCUSATE SODIUM SCH: 100 CAPSULE, LIQUID FILLED ORAL at 05:35

## 2018-05-28 RX ADMIN — PIPERACILLIN SODIUM,TAZOBACTAM SODIUM SCH MLS/HR: 3; .375 INJECTION, POWDER, FOR SOLUTION INTRAVENOUS at 01:10

## 2018-05-28 RX ADMIN — Medication PRN EACH: at 12:00

## 2018-05-28 RX ADMIN — DEXAMETHASONE SCH MG: 4 TABLET ORAL at 12:19

## 2018-05-28 RX ADMIN — NYSTATIN SCH UNITS: 100000 SUSPENSION ORAL at 05:34

## 2018-05-28 RX ADMIN — DEXAMETHASONE SCH MG: 4 TABLET ORAL at 21:49

## 2018-05-28 RX ADMIN — NYSTATIN SCH UNITS: 100000 SUSPENSION ORAL at 17:04

## 2018-05-28 RX ADMIN — SODIUM CHLORIDE SCH MLS/HR: 9 INJECTION, SOLUTION INTRAVENOUS at 08:39

## 2018-05-28 RX ADMIN — NYSTATIN SCH UNITS: 100000 SUSPENSION ORAL at 13:27

## 2018-05-28 RX ADMIN — INSULIN ASPART SCH UNITS: 100 INJECTION, SOLUTION INTRAVENOUS; SUBCUTANEOUS at 16:23

## 2018-05-28 RX ADMIN — DOCUSATE SODIUM SCH MG: 100 CAPSULE, LIQUID FILLED ORAL at 21:49

## 2018-05-28 RX ADMIN — PIPERACILLIN SODIUM,TAZOBACTAM SODIUM SCH MLS/HR: 3; .375 INJECTION, POWDER, FOR SOLUTION INTRAVENOUS at 17:04

## 2018-05-28 RX ADMIN — AMLODIPINE BESYLATE SCH MG: 10 TABLET ORAL at 09:10

## 2018-05-28 RX ADMIN — PIPERACILLIN SODIUM,TAZOBACTAM SODIUM SCH MLS/HR: 3; .375 INJECTION, POWDER, FOR SOLUTION INTRAVENOUS at 09:10

## 2018-05-28 RX ADMIN — NYSTATIN SCH UNITS: 100000 SUSPENSION ORAL at 23:15

## 2018-05-28 RX ADMIN — DOCUSATE SODIUM SCH MG: 100 CAPSULE, LIQUID FILLED ORAL at 13:28

## 2018-05-28 RX ADMIN — DEXAMETHASONE SCH: 4 TABLET ORAL at 13:28

## 2018-05-28 RX ADMIN — INSULIN ASPART SCH UNITS: 100 INJECTION, SOLUTION INTRAVENOUS; SUBCUTANEOUS at 08:29

## 2018-05-28 NOTE — PN
Progress Note (short form)





- Note


Progress Note: 





PULMONARY/CCM





Pt seen and examined in the ICU. 





24HR;


-large volume LP with improved MS


-MTX today given


-steroids being tapered





 Vital Signs











Temp  98.4 F   05/28/18 09:57


 


Pulse  82   05/28/18 11:10


 


Resp  18   05/28/18 11:10


 


BP  153/63   05/28/18 11:10


 


Pulse Ox  97   05/28/18 08:10








 Intake & Output











 05/27/18 05/27/18 05/28/18





 11:59 23:59 11:59


 


Intake Total 1050 1200 1000


 


Output Total 400 1200 50


 


Balance 650 0 950


 


Weight 90.775 kg  90.974 kg


 


Intake:   


 


   900 900


 


    Normal Saline - 1,000 ml 900 900 900





    @ 75 mls/hr IV ASDIR [facility]   





    Rx#:[id number]   


 


  IVPB 100 50 50


 


  Oral 50 250 50


 


Output:   


 


  Urine 400 1200 50


 


    [place] 400 1200 50


 


Other:   


 


  Voiding Method Indwelling Catheter Indwelling Catheter Indwelling Catheter


 


  Bowel Movement  No No


 


  Weight Measurement Method Built in Bedscale  Built in Bedscale








 CBC, BMP





 05/28/18 05:25 





 05/28/18 05:25 











Gen: more awake, HA but more comfortable


Heart: RRR


Lung: clear anterior, no wheezes


Abd: soft, nontender


Ext: no edema














Active Medications





Amlodipine Besylate (Norvasc -)  10 mg PO DAILY [facility]


   Last Admin: 05/28/18 09:10 Dose:  10 mg


Chlorhexidine Gluconate (Hibiclens For Decolonization -)  1 applic TP HS [facility]


Dexamethasone (Decadron -)  4 mg PO TID [facility]


Docusate Sodium (Colace -)  100 mg PO TID [facility]


Fentanyl (Duragesic 25mcg Patch -)  1 patch TD Q72H [facility]


   Stop: 05/29/18 11:29


Hydralazine HCl (Apresoline Injection -)  10 mg IVPUSH Q6H PRN


   PRN Reason: HYPERTENSION


Piperacillin Sod/Tazobactam (Sod 3.375 gm/ Dextrose)  50 mls @ 100 mls/hr IVPB 

Q8H-IV [facility]; Protocol


   Last Admin: 05/28/18 09:10 Dose:  100 mls/hr


Sodium Chloride (Normal Saline -)  1,000 mls @ 83 mls/hr IV ASDIR [facility]


   Last Admin: 05/28/18 08:39 Dose:  83 mls/hr


Insulin Aspart (Novolog Vial Sliding Scale -)  1 vial SQ BIDAC [facility]; Protocol


   Last Admin: 05/28/18 08:29 Dose:  2 units


Lorazepam (Ativan Injection -)  1 mg IVPUSH Q6H PRN


   PRN Reason: ANXIETY


Miscellaneous (Duragesic Patch Waste)  1 each TD PRN PRN


   PRN Reason: PAIN


Miscellaneous (Duragesic Patch Waste)  1 each MC PRN PRN


   PRN Reason: PAIN


Nystatin (Nystatin Oral Suspension -)  500,000 units PO Q6HPO [facility]


Ondansetron HCl (Zofran Injection)  8 mg IVPB Q8H PRN


   PRN Reason: NAUSEA


Oxycodone HCl (Roxicodone -)  5 mg PO Q4H PRN


   PRN Reason: PAIN


   Last Admin: 05/28/18 09:14 Dose:  5 mg


Prochlorperazine Edisylate (Compazine Injection -)  10 mg IVPB Q4H PRN


   PRN Reason: NAUSEA AND/OR VOMITING








A/P


Hypertensive Urgency


Metastatic Breast Ca 


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


Bacteruria





-  pain control


-  BP control


-  intrathecal chemo per onc, got MTX today 


-  continue decadron, tapering PO


-  antiemetics


-  pain control


-  DVT prophylaxis


-  goals of care discussions ongoing, home vs [place]?








[facility]


[id number]

## 2018-05-28 NOTE — PN
Progress Note (short form)





- Note


Progress Note: 





NEUROSURGERY 





POD #4





In ICU


 at bedside


Less H/A


PE: AF, VSS


Less drowsy, a little more verbal; following commands


HEENT- NC/AT; Neck- supple; Cor- RRR; Lungs- CTA B;; Abd- benign; Ext- no sign 

of DVT


CN- PERRL; Motor- 4- B UE/LE; Sensation- difficult to assess; DTR- hyporeflexic


Stage IV triple negative breast CA 


Given first dose of IT MTX last Thursday, gave second dose with med onc today  


Area sterilely prepped and draped; Ommaya accessed; free CSF backflow


Care d/w Dr Stanford yesterday 


Prognosis poor given leptomeningeal disease

## 2018-05-28 NOTE — PN
Progress Note (short form)





- Note


Progress Note: 








pt seen and examined. 





Much more awake and alert. 


continues to have headache, but pt says its a bit better. 





O/E:


general: much more awake and conversant


HEENT: less lethargic. Ommaya site wnl


Breasts: extensive erythema, nodularity chest wall right breast


Cor: RSR, No murmurs, No gallops


Abd: Soft, Normal bowel sounds, No organomegaly


Ext:No significant edema


Skin: No rashes, Integument intact








Temp Pulse Resp BP Pulse Ox


 


 98.4 F   76   18   124/79   97 


 


 05/28/18 09:57  05/28/18 09:57  05/28/18 09:57  05/28/18 09:57  05/28/18 08:10








 CBC, BMP





 05/28/18 05:25 





 05/28/18 05:25 





 Current Medications











Generic Name Dose Route Start Last Admin





  Trade Name Freq  PRN Reason Stop Dose Admin


 


Amlodipine Besylate  10 mg  05/27/18 14:15  05/28/18 09:10





  Norvasc -  PO   10 mg





  DAILY ANTONIO   Administration





     





     





     





     


 


Chlorhexidine Gluconate  1 applic  05/23/18 22:00  05/27/18 21:09





  Hibiclens For Decolonization -  TP   1 applic





  HS ANTONIO   Administration





     





     





     





     


 


Dexamethasone Sodium Phosphate  4 mg  05/23/18 12:30  05/28/18 05:34





  Decadron Injection -  IVPUSH   4 mg





  Q6H [facility]   Administration





     





     





     





     


 


Docusate Sodium  100 mg  05/24/18 14:00  05/28/18 05:35





  Colace -  PO   Not Given





  TID [facility]   





     





     





     





     


 


Fentanyl  1 patch  05/25/18 12:00  05/25/18 12:20





  Duragesic 25mcg Patch -  TD  05/28/18 11:59  1 patch





  Q72H [facility]   Administration





     





     





     





     


 


Hydralazine HCl  10 mg  05/25/18 07:27  05/27/18 02:20





  Apresoline Injection -  IVPUSH   10 mg





  Q6H PRN   Administration





  HYPERTENSION   





     





     





     


 


Piperacillin Sod/Tazobactam  50 mls @ 100 mls/hr  05/26/18 13:30  05/28/18 09:10





  Sod 3.375 gm/ Dextrose  IVPB   100 mls/hr





  Q8H-IV [facility]   Administration





     





     





  Protocol   





     


 


Potassium Phosphate 30 mm/  260 mls @ 62.5 mls/hr  05/28/18 07:30  05/28/18 07:

57





  Sodium Chloride  IVPB  05/28/18 11:39  62.5 mls/hr





  ONCE ONE   Administration





     





     





     





     


 


Sodium Chloride  1,000 mls @ 83 mls/hr  05/28/18 07:34  05/28/18 08:39





  Normal Saline -  IV   83 mls/hr





  ASDIR [facility]   Administration





     





     





     





     


 


Insulin Aspart  1 vial  05/28/18 07:45  05/28/18 08:29





  Novolog Vial Sliding Scale -  SQ   2 units





  BIDAC [facility]IO   Administration





     





     





  Protocol   





     


 


Lorazepam  1 mg  05/24/18 16:53  05/26/18 11:09





  Ativan Injection -  IVPUSH   1 mg





  Q6H PRN   Administration





  ANXIETY   





     





     





     


 


Miscellaneous  1 each  05/26/18 10:30  





  Duragesic Patch Waste  TD   





  PRN PRN   





  PAIN   





     





     





     


 


Nystatin  500,000 units  05/23/18 12:00  05/28/18 05:34





  Nystatin Oral Suspension -  PO   500,000 units





  Q6HPO [facility]   Administration





     





     





     





     


 


Ondansetron HCl  8 mg  05/23/18 14:46  05/24/18 08:36





  Zofran Injection  IVPB   8 mg





  Q8H PRN   Administration





  NAUSEA   





     





     





     


 


Oxycodone HCl  5 mg  05/28/18 09:09  05/28/18 09:14





  Roxicodone -  PO   5 mg





  Q4H PRN   Administration





  PAIN   





     





     





     


 


Prochlorperazine Edisylate  10 mg  05/23/18 10:56  





  Compazine Injection -  IVPB   





  Q4H PRN   





  NAUSEA AND/OR VOMITING   





     





     





     








advanced TNBC. 


LMD


HTN 





-for pain control 


-further need for LP(large vol) pending clinical course. 


-s/p second dose of IT MTX today. Appreciate NSG


-HypoNa- improving 


-will c/w dex at present doses

## 2018-05-28 NOTE — PN
Progress Note, Physician


History of Present Illness: 





much more awake and alert


no complaints


tiredness





- Current Medication List


Current Medications: 


Active Medications





Amlodipine Besylate (Norvasc -)  10 mg PO DAILY [facility]


   Last Admin: 05/28/18 09:10 Dose:  10 mg


Chlorhexidine Gluconate (Hibiclens For Decolonization -)  1 applic TP HS [facility]


Dexamethasone (Decadron -)  4 mg PO TID [facility]


   Last Admin: 05/28/18 12:19 Dose:  4 mg


Docusate Sodium (Colace -)  100 mg PO TID [facility]


Fentanyl (Duragesic 25mcg Patch -)  1 patch TD Q72H [facility]


   Stop: 05/29/18 11:29


   Last Admin: 05/28/18 12:00 Dose:  1 patch


Hydralazine HCl (Apresoline Injection -)  10 mg IVPUSH Q6H PRN


   PRN Reason: HYPERTENSION


Piperacillin Sod/Tazobactam (Sod 3.375 gm/ Dextrose)  50 mls @ 100 mls/hr IVPB 

Q8H-IV [facility]; Protocol


   Last Admin: 05/28/18 09:10 Dose:  100 mls/hr


Sodium Chloride (Normal Saline -)  1,000 mls @ 83 mls/hr IV ASDIR [facility]


   Last Admin: 05/28/18 08:39 Dose:  83 mls/hr


Insulin Aspart (Novolog Vial Sliding Scale -)  1 vial SQ BIDAC [facility]; Protocol


   Last Admin: 05/28/18 08:29 Dose:  2 units


Lorazepam (Ativan Injection -)  1 mg IVPUSH Q6H PRN


   PRN Reason: ANXIETY


Miscellaneous (Duragesic Patch Waste)  1 each TD PRN PRN


   PRN Reason: PAIN


Miscellaneous (Duragesic Patch Waste)  1 each TD PRN PRN


   PRN Reason: PAIN


Nystatin (Nystatin Oral Suspension -)  500,000 units PO Q6HPO [facility]


Ondansetron HCl (Zofran Injection)  8 mg IVPB Q8H PRN


   PRN Reason: NAUSEA


Oxycodone HCl (Roxicodone -)  5 mg PO Q4H PRN


   PRN Reason: PAIN


   Last Admin: 05/28/18 09:14 Dose:  5 mg


Prochlorperazine Edisylate (Compazine Injection -)  10 mg IVPB Q4H PRN


   PRN Reason: NAUSEA AND/OR VOMITING











- Objective


Vital Signs: 


 Vital Signs











Temperature  98.4 F   05/28/18 09:57


 


Pulse Rate  88   05/28/18 12:00


 


Respiratory Rate  18   05/28/18 12:00


 


Blood Pressure  150/68   05/28/18 12:00


 


O2 Sat by Pulse Oximetry (%)  97   05/28/18 08:10











Constitutional: Yes: Calm, Mild Distress


Cardiovascular: Yes: Regular Rate and Rhythm


Respiratory: Yes: Regular, CTA Bilaterally


Gastrointestinal: Yes: Normal Bowel Sounds, Soft


Musculoskeletal: Yes: WNL


Extremities: Yes: WNL


Neurological: Yes: Alert, Oriented


Psychiatric: Yes: Alert, Oriented


Labs: 


 CBC, BMP





 05/28/18 05:25 





 05/28/18 05:25 





 INR, PTT











INR  1.05  (0.82-1.09)   05/24/18  05:25    














Assessment/Plan














Metastatic Breast Ca 


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


leukocytosis


electrolyte abn


uti


confusion





wbc still on the higher side





plan





post op from Ommaya shunt placement 


monitor closely


continue abx


stilla waiting for identification of the organism


rest continue as per icu


monitor bp and mental status











cc time 40 min

## 2018-05-28 NOTE — PN
Physical Exam: 


SUBJECTIVE: Patient seen and examined in ICU. She is more awake and aware this 

morning. She ate some breakfast, receiving pain medication.








OBJECTIVE:





 Vital Signs











 Period  Temp  Pulse  Resp  BP Sys/Zhao  Pulse Ox


 


 Last 24 Hr  97.2 F-98.4 F    16-21  124-169/49-84  








PE:


Neuro: alert, awake this morning, cn 2-12intact 


HEENT: ommaya port placed, dressing cdi , R eye ecchymosis 


Pulm: diminished 


CV: s1 s2 rrr


Abd: soft, nd nt 


Ext: no le edema 





 Laboratory Results - last 24 hr











  05/28/18 05/28/18 05/28/18





  05:25 05:25 08:07


 


WBC  16.8 H  


 


RBC  3.44 L  


 


Hgb  12.1  


 


Hct  36.1  


 


MCV  105.1 H  


 


MCH  35.1 H  


 


MCHC  33.4  


 


RDW  17.8 H  


 


Plt Count  185  


 


MPV  7.2 L  


 


Neutrophils %  85.8 H  


 


Lymphocytes %  4.0 L D  


 


Monocytes %  10.1  


 


Eosinophils %  0.0  D  


 


Basophils %  0.1  


 


Nucleated RBC %  0  


 


Sodium   130 L 


 


Potassium   3.6 


 


Chloride   94 L 


 


Carbon Dioxide   28 


 


Anion Gap   8 


 


BUN   11 


 


Creatinine   0.3 L 


 


Creat Clearance w eGFR   > 60 


 


POC Glucometer    [id number]


 


Random Glucose   144 H 


 


Calcium   7.8 L 


 


Phosphorus   1.9 L 


 


Magnesium   1.7 L 


 


Total Bilirubin   0.6 


 


AST   39 H 


 


ALT   71 


 


Alkaline Phosphatase   105 


 


Total Protein   6.0 L 


 


Albumin   3.0 L 








Active Medications











Generic Name Dose Route Start Last Admin





  Trade Name Freq  PRN Reason Stop Dose Admin


 


Amlodipine Besylate  10 mg  05/27/18 14:15  05/28/18 09:10





  Norvasc -  PO   10 mg





  DAILY ANTONIO   Administration





     





     





     





     


 


Chlorhexidine Gluconate  1 applic  05/23/18 22:00  05/27/18 21:09





  Hibiclens For Decolonization -  TP   1 applic





  HS ANTONIO   Administration





     





     





     





     


 


Dexamethasone Sodium Phosphate  4 mg  05/23/18 12:30  05/28/18 05:34





  Decadron Injection -  IVPUSH   4 mg





  Q6H [facility]   Administration





     





     





     





     


 


Docusate Sodium  100 mg  05/24/18 14:00  05/28/18 05:35





  Colace -  PO   Not Given





  TID AN[facility]IO   





     





     





     





     


 


Fentanyl  1 patch  05/25/18 12:00  05/25/18 12:20





  Duragesic 25mcg Patch -  TD  05/28/18 11:59  1 patch





  Q72H [facility]   Administration





     





     





     





     


 


Hydralazine HCl  10 mg  05/25/18 07:27  05/27/18 02:20





  Apresoline Injection -  IVPUSH   10 mg





  Q6H PRN   Administration





  HYPERTENSION   





     





     





     


 


Piperacillin Sod/Tazobactam  50 mls @ 100 mls/hr  05/26/18 13:30  05/28/18 09:10





  Sod 3.375 gm/ Dextrose  IVPB   100 mls/hr





  Q8H-IV [facility]   Administration





     





     





  Protocol   





     


 


Potassium Phosphate 30 mm/  260 mls @ 62.5 mls/hr  05/28/18 07:30  05/28/18 07:

57





  Sodium Chloride  IVPB  05/28/18 11:39  62.5 mls/hr





  ONCE ONE   Administration





     





     





     





     


 


Sodium Chloride  1,000 mls @ 83 mls/hr  05/28/18 07:34  05/28/18 08:39





  Normal Saline -  IV   83 mls/hr





  ASDIR AN[facility]IO   Administration





     





     





     





     


 


Insulin Aspart  1 vial  05/28/18 07:45  05/28/18 08:29





  Novolog Vial Sliding Scale -  SQ   2 units





  BIDAC [facility]   Administration





     





     





  Protocol   





     


 


Lorazepam  1 mg  05/24/18 16:53  05/26/18 11:09





  Ativan Injection -  IVPUSH   1 mg





  Q6H PRN   Administration





  ANXIETY   





     





     





     


 


Methotrexate Sodium  12 mg  05/28/18 10:00  





  Mexate Injection -  IT  05/28/18 10:01  





  ONCE ONE   





     





     





     





     


 


Miscellaneous  1 each  05/26/18 10:30  





  Duragesic Patch Waste  TD   





  PRN PRN   





  PAIN   





     





     





     


 


Mupirocin  1 applic  05/23/18 10:00  05/27/18 21:09





  Bactroban Ointment (For Decolonization) -  NS  05/28/18 09:59  1 applic





  BID ANTONIO   Administration





     





     





     





     


 


Nystatin  500,000 units  05/23/18 12:00  05/28/18 05:34





  Nystatin Oral Suspension -  PO   500,000 units





  Q6HPO ANTONIO   Administration





     





     





     





     


 


Ondansetron HCl  8 mg  05/23/18 14:46  05/24/18 08:36





  Zofran Injection  IVPB   8 mg





  Q8H PRN   Administration





  NAUSEA   





     





     





     


 


Oxycodone HCl  5 mg  05/28/18 09:09  05/28/18 09:14





  Roxicodone -  PO   5 mg





  Q4H PRN   Administration





  PAIN   





     





     





     


 


Prochlorperazine Edisylate  10 mg  05/23/18 10:56  





  Compazine Injection -  IVPB   





  Q4H PRN   





  NAUSEA AND/OR VOMITING   





     





     





     











Assessment: 67 year old female with PMHx of osteoarthritis, GERD, triple 

negative metastatic right breast cancer (bone) diagnosed 9/2017 admitted with 

headache, nausea, vertigo, and diarrhea/indigestion x1 week. 





Plan: 





1. Metastatic right breast cancer


- With leptomeningeal involvement


- Repeat LP done at bedside 5/26


- s/p Ommaya reservoir placement with x1 dose MXT 5/24  


- Methotrexate to be dosed x2/week, possible dose today 


- Continue Decadron


- D/w neuro, shunt unlikely option d/t seeding and ommaya unable to handle high 

vol csf withdrawal 


- External spinal catheter for drainage has high risk for infection after 7 days

, consider re tap prior to dc once ultimate GOC are made 


- Follow cytology 


- On going goals of care discussion to be continued 





2. HTN


- Improved


- Continue norvasc 10mg daily, goal SBP <150


- Hydralazine prn 





3. Hyponatremia


- Increase 83cc/hr





4. UTI, leukocytosis 


- Urine cx 2nd organism pending 


- Zosyn per ID 





5. DVT ppx 


- MARI stockings 





6. Hyperglycemia


- Due to steroids 


- BGM BID, ISS, 





Problem List





- Problems


(1) Breast cancer metastasized to bone


Code(s): C50.919 - MALIGNANT NEOPLASM OF UNSP SITE OF UNSPECIFIED FEMALE BREAST

; C79.51 - SECONDARY MALIGNANT NEOPLASM OF BONE   


Qualifiers: 


   Laterality: right   Qualified Code(s): C50.911 - Malignant neoplasm of 

unspecified site of right female breast; C79.51 - Secondary malignant neoplasm 

of bone; C79.51 - Secondary malignant neoplasm of bone; C79.51 - Secondary 

malignant neoplasm of bone; C79.51 - Secondary malignant neoplasm of bone   





(2) Headache


Code(s): R51 - HEADACHE   


Qualifiers: 


   Headache type: unspecified   Headache chronicity pattern: unspecified 

pattern   Intractability: intractable   Qualified Code(s): R51 - Headache   





(3) Leptomeningeal metastases


Code(s): C79.49 - SECONDARY MALIGNANT NEOPLASM OF OTH PARTS OF NERVOUS SYSTEM   





(4) Nausea


Code(s): R11.0 - NAUSEA   





Visit type





- Emergency Visit


Emergency Visit: Yes


ED Registration Date: 05/17/18


Care time: The patient presented to the Emergency Department on the above date 

and was hospitalized for further evaluation of their emergent condition.





- New Patient


This patient is new to me today: No





- Critical Care


Critical Care patient: No

## 2018-05-28 NOTE — PN
Progress Note, Physician





- Current Medication List


Current Medications: 


Active Medications





Amlodipine Besylate (Norvasc -)  10 mg PO DAILY [facility]


Chlorhexidine Gluconate (Hibiclens For Decolonization -)  1 applic TP HS [facility]


   Last Admin: 05/27/18 21:09 Dose:  1 applic


Dexamethasone Sodium Phosphate (Decadron Injection -)  4 mg IVPUSH Q6H [facility]


   Last Admin: 05/28/18 05:34 Dose:  4 mg


Docusate Sodium (Colace -)  100 mg PO TID [facility]


   Last Admin: 05/28/18 05:35 Dose:  Not Given


Fentanyl (Duragesic 25mcg Patch -)  1 patch TD Q72H [facility]


   Stop: 05/28/18 11:59


   Last Admin: 05/25/18 12:20 Dose:  1 patch


Hydralazine HCl (Apresoline Injection -)  10 mg IVPUSH Q6H PRN


   PRN Reason: HYPERTENSION


   Last Admin: 05/27/18 02:20 Dose:  10 mg


Piperacillin Sod/Tazobactam (Sod 3.375 gm/ Dextrose)  50 mls @ 100 mls/hr IVPB 

Q8H-IV [facility]; Protocol


   Last Admin: 05/28/18 01:10 Dose:  100 mls/hr


Potassium Phosphate 30 mm/ (Sodium Chloride)  260 mls @ 62.5 mls/hr IVPB ONCE 

ONE


   Stop: 05/28/18 11:39


   Last Admin: 05/28/18 07:57 Dose:  62.5 mls/hr


Sodium Chloride (Normal Saline -)  1,000 mls @ 83 mls/hr IV ASDIR [facility]


   Last Admin: 05/28/18 08:39 Dose:  83 mls/hr


Insulin Aspart (Novolog Vial Sliding Scale -)  1 vial SQ BIDAC [facility]; Protocol


   Last Admin: 05/28/18 08:29 Dose:  2 units


Lorazepam (Ativan Injection -)  1 mg IVPUSH Q6H PRN


   PRN Reason: ANXIETY


   Last Admin: 05/26/18 11:09 Dose:  1 mg


Methotrexate Sodium (Mexate Injection -)  12 mg IT ONCE ONE


   Stop: 05/28/18 08:46


Miscellaneous (Duragesic Patch Waste)  1 each TD PRN PRN


   PRN Reason: PAIN


Mupirocin (Bactroban Ointment (For Decolonization) -)  1 applic NS BID [facility]


   Stop: 05/28/18 09:59


   Last Admin: 05/27/18 21:09 Dose:  1 applic


Nystatin (Nystatin Oral Suspension -)  500,000 units PO Q6HPO [facility]


   Last Admin: 05/28/18 05:34 Dose:  500,000 units


Ondansetron HCl (Zofran Injection)  8 mg IVPB Q8H PRN


   PRN Reason: NAUSEA


   Last Admin: 05/24/18 08:36 Dose:  8 mg


Prochlorperazine Edisylate (Compazine Injection -)  10 mg IVPB Q4H PRN


   PRN Reason: NAUSEA AND/OR VOMITING











- Objective


Vital Signs: 


 Vital Signs











Temperature  97.7 F   05/28/18 06:00


 


Pulse Rate  90   05/28/18 08:00


 


Respiratory Rate  16   05/28/18 08:00


 


Blood Pressure  164/84   05/28/18 08:00


 


O2 Sat by Pulse Oximetry (%)  97   05/28/18 08:10











Eyes: Yes: WNL, Conjunctiva Clear, EOM Intact


HENT: Yes: WNL, Atraumatic, Normocephalic


Neck: Yes: WNL, Supple, Trachea Midline


Cardiovascular: Yes: WNL, Regular Rate and Rhythm


Respiratory: Yes: WNL, Regular, CTA Bilaterally


Gastrointestinal: Yes: WNL, Normal Bowel Sounds


Genitourinary: Yes: WNL


Musculoskeletal: Yes: WNL


Extremities: Yes: WNL


Edema: No


Integumentary: Yes: WNL


Neurological: Yes: Alert, Oriented


...Motor Strength: WNL


Psychiatric: Yes: WNL


Labs: 


 CBC, BMP





 05/28/18 05:25 





 05/28/18 05:25 





 INR, PTT











INR  1.05  (0.82-1.09)   05/24/18  05:25    














Assessment/Plan








IMP:


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 

receiving intrathecal chemo


HTN





REC:


Cont Amlodipine 5mg daily for goal BP < 150/90











coverage dr. Steen

## 2018-05-29 LAB
ANION GAP SERPL CALC-SCNC: 7 MMOL/L (ref 8–16)
BASOPHILS # BLD: 0.1 % (ref 0–2)
BUN SERPL-MCNC: 14 MG/DL (ref 7–18)
CALCIUM SERPL-MCNC: 7.9 MG/DL (ref 8.5–10.1)
CHLORIDE SERPL-SCNC: 93 MMOL/L (ref 98–107)
CO2 SERPL-SCNC: 30 MMOL/L (ref 21–32)
CREAT SERPL-MCNC: 0.4 MG/DL (ref 0.55–1.02)
DEPRECATED RDW RBC AUTO: 18 % (ref 11.6–15.6)
EOSINOPHIL # BLD: 0 % (ref 0–4.5)
GLUCOSE SERPL-MCNC: 161 MG/DL (ref 74–106)
HCT VFR BLD CALC: 37.3 % (ref 32.4–45.2)
HGB BLD-MCNC: 12.4 GM/DL (ref 10.7–15.3)
LYMPHOCYTES # BLD: 4.4 % (ref 8–40)
MAGNESIUM SERPL-MCNC: 1.5 MG/DL (ref 1.8–2.4)
MCH RBC QN AUTO: 34.9 PG (ref 25.7–33.7)
MCHC RBC AUTO-ENTMCNC: 33.3 G/DL (ref 32–36)
MCV RBC: 104.9 FL (ref 80–96)
MONOCYTES # BLD AUTO: 15.1 % (ref 3.8–10.2)
NEUTROPHILS # BLD: 80.4 % (ref 42.8–82.8)
PHOSPHATE SERPL-MCNC: 2.4 MG/DL (ref 2.5–4.9)
PLATELET # BLD AUTO: 197 K/MM3 (ref 134–434)
PMV BLD: 7.1 FL (ref 7.5–11.1)
POTASSIUM SERPLBLD-SCNC: 3.4 MMOL/L (ref 3.5–5.1)
RBC # BLD AUTO: 3.56 M/MM3 (ref 3.6–5.2)
SODIUM SERPL-SCNC: 130 MMOL/L (ref 136–145)
WBC # BLD AUTO: 22.4 K/MM3 (ref 4–10)

## 2018-05-29 RX ADMIN — AMLODIPINE BESYLATE SCH: 10 TABLET ORAL at 12:25

## 2018-05-29 RX ADMIN — DOCUSATE SODIUM SCH: 100 CAPSULE, LIQUID FILLED ORAL at 14:29

## 2018-05-29 RX ADMIN — SODIUM CHLORIDE SCH MLS/HR: 9 INJECTION, SOLUTION INTRAVENOUS at 01:00

## 2018-05-29 RX ADMIN — PIPERACILLIN SODIUM,TAZOBACTAM SODIUM SCH MLS/HR: 3; .375 INJECTION, POWDER, FOR SOLUTION INTRAVENOUS at 17:32

## 2018-05-29 RX ADMIN — DEXAMETHASONE SODIUM PHOSPHATE SCH MG: 4 INJECTION, SOLUTION INTRAMUSCULAR; INTRAVENOUS at 17:20

## 2018-05-29 RX ADMIN — INSULIN ASPART SCH UNITS: 100 INJECTION, SOLUTION INTRAVENOUS; SUBCUTANEOUS at 17:34

## 2018-05-29 RX ADMIN — PIPERACILLIN SODIUM,TAZOBACTAM SODIUM SCH MLS/HR: 3; .375 INJECTION, POWDER, FOR SOLUTION INTRAVENOUS at 02:21

## 2018-05-29 RX ADMIN — INSULIN ASPART SCH UNITS: 100 INJECTION, SOLUTION INTRAVENOUS; SUBCUTANEOUS at 06:37

## 2018-05-29 RX ADMIN — SODIUM CHLORIDE SCH MLS/HR: 9 INJECTION, SOLUTION INTRAVENOUS at 14:29

## 2018-05-29 RX ADMIN — NYSTATIN SCH: 100000 SUSPENSION ORAL at 12:26

## 2018-05-29 RX ADMIN — PIPERACILLIN SODIUM,TAZOBACTAM SODIUM SCH MLS/HR: 3; .375 INJECTION, POWDER, FOR SOLUTION INTRAVENOUS at 11:28

## 2018-05-29 RX ADMIN — DEXAMETHASONE SODIUM PHOSPHATE SCH MG: 4 INJECTION, SOLUTION INTRAMUSCULAR; INTRAVENOUS at 12:29

## 2018-05-29 RX ADMIN — SODIUM CHLORIDE SCH: 9 INJECTION, SOLUTION INTRAVENOUS at 09:59

## 2018-05-29 RX ADMIN — DOCUSATE SODIUM SCH: 100 CAPSULE, LIQUID FILLED ORAL at 05:44

## 2018-05-29 RX ADMIN — SODIUM CHLORIDE SCH MLS/HR: 9 INJECTION, SOLUTION INTRAVENOUS at 20:00

## 2018-05-29 RX ADMIN — DOCUSATE SODIUM SCH: 100 CAPSULE, LIQUID FILLED ORAL at 22:53

## 2018-05-29 RX ADMIN — Medication PRN EACH: at 10:47

## 2018-05-29 RX ADMIN — NYSTATIN SCH: 100000 SUSPENSION ORAL at 18:06

## 2018-05-29 RX ADMIN — DEXAMETHASONE SCH: 4 TABLET ORAL at 06:25

## 2018-05-29 RX ADMIN — NYSTATIN SCH: 100000 SUSPENSION ORAL at 05:45

## 2018-05-29 NOTE — PN
Physical Exam: 


SUBJECTIVE: Patient arousable but drowsy.  Nursing staff reports agitation and 

pain overnight - patient given Ativan.  No active medical complaints;  @ 

bedside. 








OBJECTIVE:





 Vital Signs











 Period  Temp  Pulse  Resp  BP Sys/Zhao  Pulse Ox


 


 Last 24 Hr  97.5 F-98.6 F    18-24  133-191/49-85  97-97








GENERAL: drowsy, A&O x3


HEAD: R frontal incision site C/D/I


CV: S1/S2, no M/R/G


Respiratory: decreased breath sounds B/L 


Abdomen: soft, no TTP


Extremity: 2+ DP pulses, SCD's











 Laboratory Results - last 24 hr











  05/28/18 05/29/18 05/29/18





  16:10 05:30 05:30


 


WBC   22.4 H D 


 


RBC   3.56 L 


 


Hgb   12.4 


 


Hct   37.3 


 


MCV   104.9 H 


 


MCH   34.9 H 


 


MCHC   33.3 


 


RDW   18.0 H 


 


Plt Count   197 


 


MPV   7.1 L 


 


Neutrophils %   80.4 


 


Lymphocytes %   4.4 L 


 


Monocytes %   15.1 H 


 


Eosinophils %   0.0 


 


Basophils %   0.1 


 


Nucleated RBC %   0 


 


Sodium    130 L


 


Potassium    3.4 L


 


Chloride    93 L


 


Carbon Dioxide    30


 


Anion Gap    7 L


 


BUN    14


 


Creatinine    0.4 L


 


POC Glucometer  [phone]  


 


Random Glucose    161 H


 


Calcium    7.9 L


 


Phosphorus    2.4 L


 


Magnesium    1.5 L














  05/29/18





  05:47


 


WBC 


 


RBC 


 


Hgb 


 


Hct 


 


MCV 


 


MCH 


 


MCHC 


 


RDW 


 


Plt Count 


 


MPV 


 


Neutrophils % 


 


Lymphocytes % 


 


Monocytes % 


 


Eosinophils % 


 


Basophils % 


 


Nucleated RBC % 


 


Sodium 


 


Potassium 


 


Chloride 


 


Carbon Dioxide 


 


Anion Gap 


 


BUN 


 


Creatinine 


 


POC Glucometer  [phone]


 


Random Glucose 


 


Calcium 


 


Phosphorus 


 


Magnesium 








Active Medications











Generic Name Dose Route Start Last Admin





  Trade Name Freq  PRN Reason Stop Dose Admin


 


Amlodipine Besylate  10 mg  05/27/18 14:15  05/28/18 09:10





  Norvasc -  PO   10 mg





  DAILY Pending sale to [facility]   Administration





     





     





     





     


 


Chlorhexidine Gluconate  1 applic  05/28/18 22:00  05/28/18 21:49





  Hibiclens For Decolonization -  TP   1 applic





  HS [facility]   Administration





     





     





     





     


 


Dexamethasone Sodium Phosphate  4 mg  05/29/18 12:00  





  Decadron Injection -  IVPUSH   





  Q6H Pending sale to [facility]   





     





     





     





     


 


Docusate Sodium  100 mg  05/28/18 14:00  05/29/18 05:44





  Colace -  PO   Not Given





  TID Pending sale to [facility]   





     





     





     





     


 


Fentanyl  1 patch  05/29/18 10:30  





  Duragesic 50mcg Patch -  TD  06/05/18 10:22  





  Q72H [facility]   





     





     





     





     


 


Hydralazine HCl  10 mg  05/28/18 11:27  05/29/18 02:03





  Apresoline Injection -  IVPUSH   10 mg





  Q6H PRN   Administration





  HYPERTENSION   





     





     





     


 


Piperacillin Sod/Tazobactam  50 mls @ 100 mls/hr  05/26/18 13:30  05/29/18 02:21





  Sod 3.375 gm/ Dextrose  IVPB   100 mls/hr





  Q8H-IV [facility]   Administration





     





     





  Protocol   





     


 


Sodium Chloride  1,000 mls @ 83 mls/hr  05/28/18 07:34  05/29/18 09:59





  Normal Saline -  IV   Not Given





  ASDIR [facility]   





     





     





     





     


 


Potassium Phosphate 30 mm/  260 mls @ 43.333 mls/hr  05/29/18 07:08  





  Sodium Chloride  IVPB  05/29/18 13:07  





  ONCE ONE   





     





     





     





     


 


Insulin Aspart  1 vial  05/28/18 07:45  05/29/18 06:37





  Novolog Vial Sliding Scale -  SQ   2 units





  BIDAC [facility]   Administration





     





     





  Protocol   





     


 


Lorazepam  1 mg  05/28/18 11:27  05/29/18 03:58





  Ativan Injection -  IVPUSH   1 mg





  Q6H PRN   Administration





  ANXIETY   





     





     





     


 


Miscellaneous  1 each  05/26/18 10:30  





  Duragesic Patch Waste  TD   





  PRN PRN   





  PAIN   





     





     





     


 


Miscellaneous  1 each  05/28/18 11:27  05/28/18 12:00





  Duragesic Patch Waste  TD   1 each





  PRN PRN   Administration





  PAIN   





     





     





     


 


Miscellaneous  1 each  05/29/18 10:22  





  Duragesic Patch Waste  MC   





  PRN PRN   





  PAIN   





     





     





     


 


Nystatin  500,000 units  05/28/18 12:00  05/29/18 05:45





  Nystatin Oral Suspension -  PO   Not Given





  Q6HPO Pending sale to [facility]   





     





     





     





     


 


Ondansetron HCl  8 mg  05/28/18 11:27  





  Zofran Injection  IVPB   





  Q8H PRN   





  NAUSEA   





     





     





     


 


Oxycodone HCl  5 mg  05/28/18 09:09  05/28/18 23:14





  Roxicodone -  PO   5 mg





  Q4H PRN   Administration





  PAIN   





     





     





     


 


Prochlorperazine Edisylate  10 mg  05/28/18 11:27  





  Compazine Injection -  IVPB   





  Q4H PRN   





  NAUSEA AND/OR VOMITING   





     





     





     











ASSESSMENT/PLAN:


67 year old female with Stage IV Breast CA, POD #5 for R frontal Ommaya 

reservoir placement with R ventricular catheter.  At presentation patient was 

in Hypertensive urgency, now resolved. 








1. POD #5 for R FRONTAL OMMAYA RESERVOIR PLACEMENT


-Thecal Chemotherapy most recently administered yesterday (05/29)


- Pain control w/Roxycodone, Fentanyl


- S/p therapeutic LP


- Close BP, Respiratory monitoring


- Appreciate heme/onc, neurosurgery reccs





2. UTI


- WBC 22 


- Afebrile


- Clear urine in [place]


- Continue Zosyn





3. HYPOKALEMIA


- 3.4 today (05/29)


- Replace and repeat K+ pending





3. HYPERTENSIVE URGENCY RESOLVED


- 24 BP range: 130's-180's/40's-80's


- Intermittently elevated BP likely 2/2 to pain


- C/w JNC 8, target 's/90's


- Continue Norvasc with holding parameters


- Continue to monitor





FEN


- Mg and Phos daily monitoring and replace as necessary


- Soft diet as tolerated





PROPHYLAXIS


SCD


Protonix





Patient stable for transfer to inpatient floors however will hold in ICU at 

this time. 








Visit type





- Emergency Visit


Emergency Visit: No





- New Patient


This patient is new to me today: No





- Critical Care


Critical Care patient: No

## 2018-05-29 NOTE — PN
Progress Note, Physician


Chief Complaint: 





Tele: NSR , sinus tach





- Current Medication List


Current Medications: 


Active Medications





Amlodipine Besylate (Norvasc -)  10 mg PO DAILY [facility]


   Last Admin: 05/28/18 09:10 Dose:  10 mg


Chlorhexidine Gluconate (Hibiclens For Decolonization -)  1 applic TP HS [facility]


   Last Admin: 05/28/18 21:49 Dose:  1 applic


Dexamethasone (Decadron -)  4 mg PO TID [facility]


   Last Admin: 05/29/18 06:25 Dose:  Not Given


Docusate Sodium (Colace -)  100 mg PO TID [facility]


   Last Admin: 05/29/18 05:44 Dose:  Not Given


Fentanyl (Duragesic 25mcg Patch -)  1 patch TD Q72H [facility]


   Stop: 05/29/18 11:29


   Last Admin: 05/28/18 12:00 Dose:  1 patch


Hydralazine HCl (Apresoline Injection -)  10 mg IVPUSH Q6H PRN


   PRN Reason: HYPERTENSION


   Last Admin: 05/29/18 02:03 Dose:  10 mg


Piperacillin Sod/Tazobactam (Sod 3.375 gm/ Dextrose)  50 mls @ 100 mls/hr IVPB 

Q8H-IV [facility]; Protocol


   Last Admin: 05/29/18 02:21 Dose:  100 mls/hr


Sodium Chloride (Normal Saline -)  1,000 mls @ 83 mls/hr IV ASDIR [facility]


   Last Admin: 05/29/18 01:00 Dose:  83 mls/hr


Potassium Phosphate 30 mm/ (Sodium Chloride)  260 mls @ 62.5 mls/hr IVPB ONCE 

ONE


   Stop: 05/29/18 11:17


Potassium Chloride (Potassium Chloride 10 Meq Premix Ivpb -)  10 meq in 100 mls 

@ 100 mls/hr IVPB Q60M [facility]


   Stop: 05/29/18 08:14


Insulin Aspart (Novolog Vial Sliding Scale -)  1 vial SQ BIDAC [facility]; Protocol


   Last Admin: 05/29/18 06:37 Dose:  2 units


Lorazepam (Ativan Injection -)  1 mg IVPUSH Q6H PRN


   PRN Reason: ANXIETY


   Last Admin: 05/29/18 03:58 Dose:  1 mg


Magnesium Sulfate (Magnesium Sulfate)  2 gm IVPB ONCE ONE


   Stop: 05/29/18 07:08


Miscellaneous (Duragesic Patch Waste)  1 each TD PRN PRN


   PRN Reason: PAIN


Miscellaneous (Duragesic Patch Waste)  1 each TD PRN PRN


   PRN Reason: PAIN


   Last Admin: 05/28/18 12:00 Dose:  1 each


Nystatin (Nystatin Oral Suspension -)  500,000 units PO Q6HPO [facility]


   Last Admin: 05/29/18 05:45 Dose:  Not Given


Ondansetron HCl (Zofran Injection)  8 mg IVPB Q8H PRN


   PRN Reason: NAUSEA


Oxycodone HCl (Roxicodone -)  5 mg PO Q4H PRN


   PRN Reason: PAIN


   Last Admin: 05/28/18 23:14 Dose:  5 mg


Prochlorperazine Edisylate (Compazine Injection -)  10 mg IVPB Q4H PRN


   PRN Reason: NAUSEA AND/OR VOMITING











- Objective


Vital Signs: 


 Vital Signs











Temperature  97.8 F   05/29/18 05:49


 


Pulse Rate  93 H  05/29/18 05:49


 


Respiratory Rate  23   05/29/18 05:49


 


Blood Pressure  145/56   05/29/18 05:49


 


O2 Sat by Pulse Oximetry (%)  97   05/28/18 19:31











Constitutional: Yes: No Distress


Cardiovascular: Yes: Regular Rate and Rhythm


Respiratory: Yes: CTA Bilaterally


Gastrointestinal: Yes: Soft


Edema: No


Labs: 


 CBC, BMP





 05/29/18 05:30 





 INR, PTT











INR  1.05  (0.82-1.09)   05/24/18  05:25    














- ....Imaging


EKG: Image Reviewed





Assessment/Plan





IMP:


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 

receiving intrathecal chemo


HTN





REC:


Cont Amlodipine daily for goal BP < 150/90


DNR/DNI

## 2018-05-29 NOTE — PN
Teaching Attending Note


Name of Resident: Kajal Parkinson





ATTENDING PHYSICIAN STATEMENT





I saw and evaluated the patient.


I reviewed the resident's note and discussed the case with the resident.


I agree with the resident's findings and plan as documented.








SUBJECTIVE:





Pt seen and examined in the ICU. Lethargic but arousable. 





OBJECTIVE:





 Vital Signs











 Period  Temp  Pulse  Resp  BP Sys/Zhao  Pulse Ox


 


 Last 24 Hr  97.5 F-98.6 F    18-24  133-191/49-86  97-97








 Intake & Output











 05/26/18 05/27/18 05/28/18 05/29/18





 23:59 23:59 23:59 23:59


 


Intake Total 1570 2250 2250 1046


 


Output Total 2900 1600 2350 600


 


Balance -1330 650 -100 446


 


Weight 91.399 kg 90.775 kg 90.974 kg 92.108 kg








Gen:  lethargic


Heart: RRR


Lung: decreased breath sounds at the bases


Abd: soft, nontender


Ext: no edema





 CBC, BMP





 05/29/18 05:30 





 05/29/18 05:30 





Active Medications





Amlodipine Besylate (Norvasc -)  10 mg PO DAILY [facility]


   Last Admin: 05/28/18 09:10 Dose:  10 mg


Chlorhexidine Gluconate (Hibiclens For Decolonization -)  1 applic TP HS [facility]


   Last Admin: 05/28/18 21:49 Dose:  1 applic


Dexamethasone Sodium Phosphate (Decadron Injection -)  4 mg IVPUSH Q6H [facility]


Docusate Sodium (Colace -)  100 mg PO TID [facility]


   Last Admin: 05/29/18 05:44 Dose:  Not Given


Fentanyl (Duragesic 50mcg Patch -)  1 patch TD Q72H [facility]


   Stop: 06/05/18 10:22


   Last Admin: 05/29/18 10:44 Dose:  1 patch


Hydralazine HCl (Apresoline Injection -)  10 mg IVPUSH Q6H PRN


   PRN Reason: HYPERTENSION


   Last Admin: 05/29/18 02:03 Dose:  10 mg


Piperacillin Sod/Tazobactam (Sod 3.375 gm/ Dextrose)  50 mls @ 100 mls/hr IVPB 

Q8H-IV [facility]; Protocol


   Last Admin: 05/29/18 02:21 Dose:  100 mls/hr


Sodium Chloride (Normal Saline -)  1,000 mls @ 83 mls/hr IV ASDIR [facility]


   Last Admin: 05/29/18 09:59 Dose:  Not Given


Potassium Phosphate 30 mm/ (Sodium Chloride)  260 mls @ 43.333 mls/hr IVPB ONCE 

ONE


   Stop: 05/29/18 13:07


Insulin Aspart (Novolog Vial Sliding Scale -)  1 vial SQ BIDAC [facility]; Protocol


   Last Admin: 05/29/18 06:37 Dose:  2 units


Lorazepam (Ativan Injection -)  1 mg IVPUSH Q6H PRN


   PRN Reason: ANXIETY


   Last Admin: 05/29/18 03:58 Dose:  1 mg


Miscellaneous (Duragesic Patch Waste)  1 each TD PRN PRN


   PRN Reason: PAIN


Miscellaneous (Duragesic Patch Waste)  1 each TD PRN PRN


   PRN Reason: PAIN


   Last Admin: 05/29/18 10:47 Dose:  1 each


Miscellaneous (Duragesic Patch Waste)  1 each MC PRN PRN


   PRN Reason: PAIN


Nystatin (Nystatin Oral Suspension -)  500,000 units PO Q6HPO [facility]


   Last Admin: 05/29/18 05:45 Dose:  Not Given


Ondansetron HCl (Zofran Injection)  8 mg IVPB Q8H PRN


   PRN Reason: NAUSEA


Oxycodone HCl (Roxicodone -)  5 mg PO Q4H PRN


   PRN Reason: PAIN


   Last Admin: 05/28/18 23:14 Dose:  5 mg


Prochlorperazine Edisylate (Compazine Injection -)  10 mg IVPB Q4H PRN


   PRN Reason: NAUSEA AND/OR VOMITING








ASSESSMENT AND PLAN:


Hypertensive Urgency


Metastatic Breast Ca 


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


Bacteruria





-  pain control


-  BP control


-  intrathecal chemo per onc


-  continue decadron


-  antiemetics


-  pain control


-  DVT prophylaxis


-  goals of care discussions ongoing


-  can monitor on 7W

## 2018-05-29 NOTE — PN
Progress Note (short form)





- Note


Progress Note: 





Subjective: The patient was seen at the bedside, she spontaneously opens eyes


- Changed po decadron to IV 


- Increased Fentanyl patch to 50mcg (discussed with Dr. Christianson)





 Current Medications











Generic Name Dose Route Start Last Admin





  Trade Name Freq  PRN Reason Stop Dose Admin


 


Amlodipine Besylate  10 mg  05/27/18 14:15  05/28/18 09:10





  Norvasc -  PO   10 mg





  DAILY [facility]   Administration





     





     





     





     


 


Chlorhexidine Gluconate  1 applic  05/28/18 22:00  05/28/18 21:49





  Hibiclens For Decolonization -  TP   1 applic





  HS ANTONIO   Administration





     





     





     





     


 


Dexamethasone Sodium Phosphate  4 mg  05/29/18 12:00  





  Decadron Injection -  IVPUSH   





  Q6H [facility]   





     





     





     





     


 


Docusate Sodium  100 mg  05/28/18 14:00  05/29/18 05:44





  Colace -  PO   Not Given





  TID [facility]   





     





     





     





     


 


Fentanyl  1 patch  05/29/18 10:30  05/29/18 10:44





  Duragesic 50mcg Patch -  TD  06/05/18 10:22  1 patch





  Q72H [facility]   Administration





     





     





     





     


 


Hydralazine HCl  10 mg  05/28/18 11:27  05/29/18 02:03





  Apresoline Injection -  IVPUSH   10 mg





  Q6H PRN   Administration





  HYPERTENSION   





     





     





     


 


Piperacillin Sod/Tazobactam  50 mls @ 100 mls/hr  05/26/18 13:30  05/29/18 11:28





  Sod 3.375 gm/ Dextrose  IVPB   100 mls/hr





  Q8H-IV [facility]   Administration





     





     





  Protocol   





     


 


Sodium Chloride  1,000 mls @ 83 mls/hr  05/28/18 07:34  05/29/18 09:59





  Normal Saline -  IV   Not Given





  ASDIR [facility]   





     





     





     





     


 


Potassium Phosphate 30 mm/  260 mls @ 43.333 mls/hr  05/29/18 07:08  





  Sodium Chloride  IVPB  05/29/18 13:07  





  ONCE ONE   





     





     





     





     


 


Insulin Aspart  1 vial  05/28/18 07:45  05/29/18 06:37





  Novolog Vial Sliding Scale -  SQ   2 units





  BIDAC [facility]   Administration





     





     





  Protocol   





     


 


Lorazepam  1 mg  05/28/18 11:27  05/29/18 03:58





  Ativan Injection -  IVPUSH   1 mg





  Q6H PRN   Administration





  ANXIETY   





     





     





     


 


Miscellaneous  1 each  05/26/18 10:30  





  Duragesic Patch Waste  TD   





  PRN PRN   





  PAIN   





     





     





     


 


Miscellaneous  1 each  05/28/18 11:27  05/29/18 10:47





  Duragesic Patch Waste  TD   1 each





  PRN PRN   Administration





  PAIN   





     





     





     


 


Miscellaneous  1 each  05/29/18 10:22  





  Duragesic Patch Waste  MC   





  PRN PRN   





  PAIN   





     





     





     


 


Nystatin  500,000 units  05/28/18 12:00  05/29/18 05:45





  Nystatin Oral Suspension -  PO   Not Given





  Q6HPO [facility]   





     





     





     





     


 


Ondansetron HCl  8 mg  05/28/18 11:27  





  Zofran Injection  IVPB   





  Q8H PRN   





  NAUSEA   





     





     





     


 


Oxycodone HCl  5 mg  05/28/18 09:09  05/28/18 23:14





  Roxicodone -  PO   5 mg





  Q4H PRN   Administration





  PAIN   





     





     





     


 


Prochlorperazine Edisylate  10 mg  05/28/18 11:27  





  Compazine Injection -  IVPB   





  Q4H PRN   





  NAUSEA AND/OR VOMITING   





     





     





     








Objective: 


  


 Vital Signs











 Period  Temp  Pulse  Resp  BP Sys/Zhao  Pulse Ox


 


 Last 24 Hr  97.5 F-98.6 F    18-24  133-191/49-86  97-97








Physical Exam: 


General: Opens eyes spontaneously, appears uncomfortable





                     CBCD











WBC  22.4 K/mm3 (4.0-10.0)  H D 05/29/18  05:30    


 


RBC  3.56 M/mm3 (3.60-5.2)  L  05/29/18  05:30    


 


Hgb  12.4 GM/dL (10.7-15.3)   05/29/18  05:30    


 


Hct  37.3 % (32.4-45.2)   05/29/18  05:30    


 


MCV  104.9 fl (80-96)  H  05/29/18  05:30    


 


MCHC  33.3 g/dl (32.0-36.0)   05/29/18  05:30    


 


RDW  18.0 % (11.6-15.6)  H  05/29/18  05:30    


 


Plt Count  197 K/MM3 (134-434)   05/29/18  05:30    


 


MPV  7.1 fl (7.5-11.1)  L  05/29/18  05:30    








 CMP











Sodium  130 mmol/L (136-145)  L  05/29/18  05:30    


 


Potassium  3.4 mmol/L (3.5-5.1)  L  05/29/18  05:30    


 


Chloride  93 mmol/L ()  L  05/29/18  05:30    


 


Carbon Dioxide  30 mmol/L (21-32)   05/29/18  05:30    


 


Anion Gap  7  (8-16)  L  05/29/18  05:30    


 


BUN  14 mg/dL (7-18)   05/29/18  05:30    


 


Creatinine  0.4 mg/dL (0.55-1.02)  L  05/29/18  05:30    


 


Creat Clearance w eGFR  > 60  (>60)   05/28/18  05:25    


 


Random Glucose  161 mg/dL ()  H  05/29/18  05:30    


 


Calcium  7.9 mg/dL (8.5-10.1)  L  05/29/18  05:30    


 


Total Bilirubin  0.6 mg/dL (0.2-1.0)   05/28/18  05:25    


 


AST  39 U/L (15-37)  H  05/28/18  05:25    


 


ALT  71 U/L (12-78)   05/28/18  05:25    


 


Alkaline Phosphatase  105 U/L ()   05/28/18  05:25    


 


Total Protein  6.0 g/dl (6.4-8.2)  L  05/28/18  05:25    


 


Albumin  3.0 g/dl (3.4-5.0)  L  05/28/18  05:25    








 CARDIAC ENZYMES











Creatine Kinase  66 IU/L ()   05/23/18  11:55    


 


Troponin I  < 0.02 ng/ml (0.00-0.05)   05/23/18  11:55    








 Microbiology





05/23/18 12:30   Blood - Peripheral Venous   Blood Culture - Final


                            NO GROWTH AFTER 5 DAYS INCUBATION


05/23/18 08:08   Blood - Karri Cath   Blood Culture - Final


                            NO GROWTH AFTER 5 DAYS INCUBATION


05/24/18 12:35   Cerebral Spinal Fluid - Ventricular Csf   Gram Stain - Final


05/24/18 12:35   Cerebral Spinal Fluid - Ventricular Csf   CSF Culture - Final


                            NO GROWTH AFTER 48 HOURS INCUBATION


05/23/18 12:15   Urine - Urine Clean Catch   Urine Culture - Final


                            Escherichia Coli


                            Pending Organism


05/18/18 08:30   Cerebral Spinal Fluid - Lumbar Puncture   Gram Stain - Final





Imaging: 


- MRI brain (5/14/18): There is faint focal linear-like increased T2 signal 

intensity in superior aspect of the right cerebellum that appears to be 

enhancing on the post contrast examination. There is also leptomeningeal 

enhancement within the superior cerebellar folia bilaterally. Leptomeningeal 

enchancement along posterior margin of the clivus as well as dural enhancement 

in the included portion of the upper cervical spine





Assessment: This is a 67 year old female with PMHx of osteoarthritis, GERD, 

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 

presented to the ED with headache, nausea, vertigo, and diarrhea/indigestion x1 

week. 





Plan: 


1) Metastatic right breast cancer


- Leptomeningeal involvement


- S/p Ommaya reservoir with IT MXT 5/24, 5/28, next dose on 5/31


- Repeat LP done at bedside on 5/26 with improvement in mentation, patient now 

lethargic today, non-verbal


- Increased Decadron to 4mg IVP q6h


- Ongoing discussion regarding goals of care; consider another LP (large volume

) for therapeutic purposes


- Follow cytology


- Appreciate oncology consult


- Appreciate neurosurgery consult


- Appreciate neurology consult





2) Hypertension


- Continue Norvasc if awake to take po


- If not awake, continue Hydralazine prn





3) UTI, leukocytosis


- E.coli uti, 2nd organism pending


- Continue Zosyn for now, discussed with ID





4) Bradycardia, EKG changes


- Bradycardia resolved


- Appreciate cardiology consult





5) Hyponatremia


- Continue IV fluids





6) F/E/N:


- Hypokalemia: replete


- Hypomagnesemia: replete


- Hypophosphatemia: replete


- Continue IV fluids, diet if patient more awake





7) Prophylaxis: 


- TEDs stockings





8) Dispo:


- GOC discussion ongoing





CODE STATUS: DNR/DNI





Visit type





- Emergency Visit


Emergency Visit: Yes


ED Registration Date: 05/17/18


Care time: The patient presented to the Emergency Department on the above date 

and was hospitalized for further evaluation of their emergent condition.





- New Patient


This patient is new to me today: No





- Critical Care


Critical Care patient: Yes


Total Critical Care Time (in minutes): 45


Critical Care Statement: The care of this patient involved high complexity 

decision making to prevent further life threatening deterioration of the patient

's condition and/or to evaluate & treat vital organ system(s) failure or risk 

of failure.

## 2018-05-29 NOTE — PN
Progress Note (short form)





- Note


Progress Note: 








pt seen and examined. 





today, more lethargic


increased pain. 


received ativan, fentanyl push 





O/E:


general: lethargic , but opens eyes on verbal 


HEENT: lethargic. Ommaya site wnl, has mild bruising at the canthi bilaterally 


Breasts: extensive erythema, nodularity chest wall right breast


Cor: RSR, No murmurs, No gallops


Abd: Soft, Normal bowel sounds, No organomegaly


Ext:No significant edema


Skin: No rashes, Integument intact








Temp Pulse Resp BP Pulse Ox


 


 98.4 F   76   18   124/79   97 


 


 05/28/18 09:57  05/28/18 09:57  05/28/18 09:57  05/28/18 09:57  05/28/18 08:10








 CBC, BMP





 05/28/18 05:25 





 05/28/18 05:25 





 Current Medications











Generic Name Dose Route Start Last Admin





  Trade Name Freq  PRN Reason Stop Dose Admin


 


Amlodipine Besylate  10 mg  05/27/18 14:15  05/28/18 09:10





  Norvasc -  PO   10 mg





  DAILY ANTONIO   Administration





     





     





     





     


 


Chlorhexidine Gluconate  1 applic  05/23/18 22:00  05/27/18 21:09





  Hibiclens For Decolonization -  TP   1 applic





  HS ANTONIO   Administration





     





     





     





     


 


Dexamethasone Sodium Phosphate  4 mg  05/23/18 12:30  05/28/18 05:34





  Decadron Injection -  IVPUSH   4 mg





  Q6H ANTONIO   Administration





     





     





     





     


 


Docusate Sodium  100 mg  05/24/18 14:00  05/28/18 05:35





  Colace -  PO   Not Given





  TID ANTONIO   





     





     





     





     


 


Fentanyl  1 patch  05/25/18 12:00  05/25/18 12:20





  Duragesic 25mcg Patch -  TD  05/28/18 11:59  1 patch





  Q72H ANTONIO   Administration





     





     





     





     


 


Hydralazine HCl  10 mg  05/25/18 07:27  05/27/18 02:20





  Apresoline Injection -  IVPUSH   10 mg





  Q6H PRN   Administration





  HYPERTENSION   





     





     





     


 


Piperacillin Sod/Tazobactam  50 mls @ 100 mls/hr  05/26/18 13:30  05/28/18 09:10





  Sod 3.375 gm/ Dextrose  IVPB   100 mls/hr





  Q8H-IV ANTONIO   Administration





     





     





  Protocol   





     


 


Potassium Phosphate 30 mm/  260 mls @ 62.5 mls/hr  05/28/18 07:30  05/28/18 07:

57





  Sodium Chloride  IVPB  05/28/18 11:39  62.5 mls/hr





  ONCE ONE   Administration





     





     





     





     


 


Sodium Chloride  1,000 mls @ 83 mls/hr  05/28/18 07:34  05/28/18 08:39





  Normal Saline -  IV   83 mls/hr





  ASDIR ANTONIO   Administration





     





     





     





     


 


Insulin Aspart  1 vial  05/28/18 07:45  05/28/18 08:29





  Novolog Vial Sliding Scale -  SQ   2 units





  BIDAC ANTONIO   Administration





     





     





  Protocol   





     


 


Lorazepam  1 mg  05/24/18 16:53  05/26/18 11:09





  Ativan Injection -  IVPUSH   1 mg





  Q6H PRN   Administration





  ANXIETY   





     





     





     


 


Miscellaneous  1 each  05/26/18 10:30  





  Duragesic Patch Waste  TD   





  PRN PRN   





  PAIN   





     





     





     


 


Nystatin  500,000 units  05/23/18 12:00  05/28/18 05:34





  Nystatin Oral Suspension -  PO   500,000 units





  Q6HPO ANTONIO   Administration





     





     





     





     


 


Ondansetron HCl  8 mg  05/23/18 14:46  05/24/18 08:36





  Zofran Injection  IVPB   8 mg





  Q8H PRN   Administration





  NAUSEA   





     





     





     


 


Oxycodone HCl  5 mg  05/28/18 09:09  05/28/18 09:14





  Roxicodone -  PO   5 mg





  Q4H PRN   Administration





  PAIN   





     





     





     


 


Prochlorperazine Edisylate  10 mg  05/23/18 10:56  





  Compazine Injection -  IVPB   





  Q4H PRN   





  NAUSEA AND/OR VOMITING   





     





     





     








advanced TNBC. 


LMD


HTN 





-for pain control , adjust fentanyl, change PO dex to iv


-further need for LP(large vol) ? f/u Neuro. 


-s/p second dose of IT MTX on 5/28. Next on 5/31


-Monitor bilateral canthi bruising, ?Etiology


-DNR/DNI


-GOC discussions ongoing

## 2018-05-29 NOTE — PN
Progress Note, Physician


History of Present Illness: 





more lethargic


status waxes off and on





- Current Medication List


Current Medications: 


Active Medications





Amlodipine Besylate (Norvasc -)  10 mg PO DAILY [facility]


Chlorhexidine Gluconate (Hibiclens For Decolonization -)  1 applic TP HS [facility]


   Last Admin: 05/28/18 21:49 Dose:  1 applic


Dexamethasone Sodium Phosphate (Decadron Injection -)  4 mg IVPUSH Q6H [facility]


   Last Admin: 05/29/18 12:29 Dose:  4 mg


Docusate Sodium (Colace -)  100 mg PO TID [facility]


   Last Admin: 05/29/18 05:44 Dose:  Not Given


Fentanyl (Duragesic 50mcg Patch -)  1 patch TD Q72H [facility]


   Stop: 06/05/18 10:22


   Last Admin: 05/29/18 10:44 Dose:  1 patch


Hydralazine HCl (Apresoline Injection -)  10 mg IVPUSH Q6H PRN


   PRN Reason: HYPERTENSION


Piperacillin Sod/Tazobactam (Sod 3.375 gm/ Dextrose)  50 mls @ 100 mls/hr IVPB 

Q8H-IV [facility]; Protocol


   Last Admin: 05/29/18 11:28 Dose:  100 mls/hr


Sodium Chloride (Normal Saline -)  1,000 mls @ 83 mls/hr IV ASDIR [facility]


   Last Admin: 05/29/18 09:59 Dose:  Not Given


Insulin Aspart (Novolog Vial Sliding Scale -)  1 vial SQ BIDAC [facility]; Protocol


   Last Admin: 05/29/18 06:37 Dose:  2 units


Lorazepam (Ativan Injection -)  1 mg IVPUSH Q6H PRN


   PRN Reason: ANXIETY


   Last Admin: 05/29/18 03:58 Dose:  1 mg


Miscellaneous (Duragesic Patch Waste)  1 each TD PRN PRN


   PRN Reason: PAIN


Miscellaneous (Duragesic Patch Waste)  1 each TD PRN PRN


   PRN Reason: PAIN


   Last Admin: 05/29/18 10:47 Dose:  1 each


Miscellaneous (Duragesic Patch Waste)  1 each MC PRN PRN


   PRN Reason: PAIN


Nystatin (Nystatin Oral Suspension -)  500,000 units PO Q6HPO [facility]


   Last Admin: 05/29/18 12:26 Dose:  Not Given


Ondansetron HCl (Zofran Injection)  8 mg IVPB Q8H PRN


   PRN Reason: NAUSEA


Oxycodone HCl (Roxicodone -)  5 mg PO Q4H PRN


   PRN Reason: PAIN


   Last Admin: 05/28/18 23:14 Dose:  5 mg


Prochlorperazine Edisylate (Compazine Injection -)  10 mg IVPB Q4H PRN


   PRN Reason: NAUSEA AND/OR VOMITING











- Objective


Vital Signs: 


 Vital Signs











Temperature  98.2 F   05/29/18 10:00


 


Pulse Rate  106 H  05/29/18 10:00


 


Respiratory Rate  24   05/29/18 10:00


 


Blood Pressure  156/86   05/29/18 10:00


 


O2 Sat by Pulse Oximetry (%)  97   05/29/18 08:12











Constitutional: Yes: No Distress, Calm, Other


Cardiovascular: Yes: Regular Rate and Rhythm


Respiratory: Yes: Regular, CTA Bilaterally


Gastrointestinal: Yes: Normal Bowel Sounds, Soft


Neurological: Yes: Lethargy


Psychiatric: Yes: Other


Labs: 


 CBC, BMP





 05/29/18 05:30 





 05/29/18 05:30 





 INR, PTT











INR  1.05  (0.82-1.09)   05/24/18  05:25    














Assessment/Plan














Metastatic Breast Ca 


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


leukocytosis


electrolyte abn


uti


confusion





wbc still on the higher side





plan





post op from Ommaya shunt placement 


monitor closely


continue abx


will deescalate tomorrow


rest continue as per icu


monitor bp and mental status











cc time 40 min

## 2018-05-30 LAB
ALBUMIN SERPL-MCNC: 2.9 G/DL (ref 3.4–5)
ALP SERPL-CCNC: 110 U/L (ref 45–117)
ALT SERPL-CCNC: 74 U/L (ref 12–78)
ANION GAP SERPL CALC-SCNC: 11 MMOL/L (ref 8–16)
AST SERPL-CCNC: 27 U/L (ref 15–37)
BASOPHILS # BLD: 0.1 % (ref 0–2)
BILIRUB SERPL-MCNC: 0.7 MG/DL (ref 0.2–1)
BUN SERPL-MCNC: 12 MG/DL (ref 7–18)
CALCIUM SERPL-MCNC: 7.5 MG/DL (ref 8.5–10.1)
CHLORIDE SERPL-SCNC: 92 MMOL/L (ref 98–107)
CO2 SERPL-SCNC: 26 MMOL/L (ref 21–32)
CREAT SERPL-MCNC: 0.3 MG/DL (ref 0.55–1.02)
DEPRECATED RDW RBC AUTO: 18.1 % (ref 11.6–15.6)
EOSINOPHIL # BLD: 0 % (ref 0–4.5)
GLUCOSE SERPL-MCNC: 144 MG/DL (ref 74–106)
HCT VFR BLD CALC: 37.2 % (ref 32.4–45.2)
HGB BLD-MCNC: 12.2 GM/DL (ref 10.7–15.3)
LYMPHOCYTES # BLD: 2.6 % (ref 8–40)
MAGNESIUM SERPL-MCNC: 1.9 MG/DL (ref 1.8–2.4)
MCH RBC QN AUTO: 34.6 PG (ref 25.7–33.7)
MCHC RBC AUTO-ENTMCNC: 32.7 G/DL (ref 32–36)
MCV RBC: 105.7 FL (ref 80–96)
MONOCYTES # BLD AUTO: 7.3 % (ref 3.8–10.2)
NEUTROPHILS # BLD: 90 % (ref 42.8–82.8)
PHOSPHATE SERPL-MCNC: 2.9 MG/DL (ref 2.5–4.9)
PLATELET # BLD AUTO: 190 K/MM3 (ref 134–434)
PMV BLD: 7.4 FL (ref 7.5–11.1)
POTASSIUM SERPLBLD-SCNC: 3.8 MMOL/L (ref 3.5–5.1)
PROT SERPL-MCNC: 5.9 G/DL (ref 6.4–8.2)
RBC # BLD AUTO: 3.52 M/MM3 (ref 3.6–5.2)
SODIUM SERPL-SCNC: 129 MMOL/L (ref 136–145)
WBC # BLD AUTO: 24.3 K/MM3 (ref 4–10)

## 2018-05-30 RX ADMIN — DOCUSATE SODIUM SCH: 100 CAPSULE, LIQUID FILLED ORAL at 05:11

## 2018-05-30 RX ADMIN — DEXAMETHASONE SODIUM PHOSPHATE SCH MG: 4 INJECTION, SOLUTION INTRAMUSCULAR; INTRAVENOUS at 06:14

## 2018-05-30 RX ADMIN — SODIUM CHLORIDE SCH MLS/HR: 9 INJECTION, SOLUTION INTRAVENOUS at 06:16

## 2018-05-30 RX ADMIN — DEXAMETHASONE SODIUM PHOSPHATE SCH MG: 4 INJECTION, SOLUTION INTRAMUSCULAR; INTRAVENOUS at 11:47

## 2018-05-30 RX ADMIN — DEXAMETHASONE SODIUM PHOSPHATE SCH MG: 4 INJECTION, SOLUTION INTRAMUSCULAR; INTRAVENOUS at 00:31

## 2018-05-30 RX ADMIN — NYSTATIN SCH: 100000 SUSPENSION ORAL at 11:33

## 2018-05-30 RX ADMIN — PIPERACILLIN SODIUM,TAZOBACTAM SODIUM SCH MLS/HR: 3; .375 INJECTION, POWDER, FOR SOLUTION INTRAVENOUS at 01:50

## 2018-05-30 RX ADMIN — NYSTATIN SCH: 100000 SUSPENSION ORAL at 00:31

## 2018-05-30 RX ADMIN — SODIUM CHLORIDE SCH MLS/HR: 9 INJECTION, SOLUTION INTRAVENOUS at 11:47

## 2018-05-30 RX ADMIN — PIPERACILLIN SODIUM,TAZOBACTAM SODIUM SCH MLS/HR: 3; .375 INJECTION, POWDER, FOR SOLUTION INTRAVENOUS at 09:12

## 2018-05-30 RX ADMIN — DEXAMETHASONE SODIUM PHOSPHATE SCH MG: 4 INJECTION, SOLUTION INTRAMUSCULAR; INTRAVENOUS at 17:14

## 2018-05-30 RX ADMIN — NYSTATIN SCH: 100000 SUSPENSION ORAL at 05:11

## 2018-05-30 RX ADMIN — DEXAMETHASONE SODIUM PHOSPHATE SCH MG: 4 INJECTION, SOLUTION INTRAMUSCULAR; INTRAVENOUS at 23:01

## 2018-05-30 NOTE — PN
Progress Note (short form)





- Note


Progress Note: 





NEUROSURGERY 





POD #6





In ICU


Less H/A


PE: Tmax 97.6, AF, VSS


Drowsy, not following commands


HEENT- NC/AT, B periorbital ecchymosis; Neck- supple; Cor- RRR; Lungs- CTA B;; 

Abd- benign; Ext- no sign of DVT


CN- PERRL; Motor- 4- B UE/LE; Sensation- difficult to assess; DTR- hyporeflexic


WBC 22.4


Stage IV triple negative breast CA 


Received 2 doses IT Mtx to date  


Prognosis poor given leptomeningeal disease


Could consider more LP's but the effects, though positive, have been short-lived


Pain meds


On Zosyn


Pt is DNI/DNR

## 2018-05-30 NOTE — PATH
Cytology Non-Gynecological Report



Patient Name:  ELINOR ROWLAND

Accession #:  

Med. Rec. #:  [id number]                                                        

   /Age/Gender:  1950 (Age: 67) / F

Account:  [id number]                                                          

             Location: ICU 

Taken:  2018

Received:  2018

Reported:  2018

Physicians:  TANIYA De Leon M.D. Smitha Mellacheruvu, M.D.

Copy To:  SENT TO [facility] FOR FLOW CYTOMETRY



Specimen(s) Received

A: CEREBRAL SPINAL FLUID 

B: CEREBRAL SPINAL FLUID 





Clinical History

Breast cancer, leptomeningeal disease







Final Diagnosis

A & B. CEREBROSPINAL FLUID FOR CYTOLOGY:

SATISFACTORY FOR EVALUATION.

POSITIVE FOR MALIGNANT CELLS.

CARCINOMA.

FEW ATYPICAL EPITHELIAL CELLS WITH MILD NUCLEAR ENLARGEMENT, ECCENTRIC NUCLEI

AND RARE INTRACYTOPLASMIC INCLUSIONS PRESENT.

SEE COMMENT.



Comment: Immunohistochemical stains performed on cytospins and interpreted at

[facility] show the tumor cells are positive for Cytokeratin

AE1/3, while negative for ER. WA stain was attempted but were non-contributory.

Overall findings are consistent with patient's known history of breast

carcinoma. See prior cytology  for additional immunophenotype. Flow

cytometry is pending and will be reported separately. Findings discussed with

Radha Estevez NP and Dr. Sloan. Suggest clinical/radiologic correlation. 





***Electronically Signed***

Dorene Kenney M.D.





Gross Description

A.  Approximately 1 cc of yellow-tinged fluid received fresh.  Three cytofunnels

prepared.

B.  Approximately 7 cc of yellow-tinged fluid received fresh.  One cytofunnel

prepared. The remainder of the specimen is placed in RPMI solution and sent to

[facility] laboratory for flow cytometry.

## 2018-05-30 NOTE — PN
Progress Note (short form)





- Note


Progress Note: 








pt seen and examined. 





events noted





O/E:


general: lethargic , but opens eyes on verbal 


HEENT: lethargic. Ommaya site wnl, has mild bruising at the canthi bilaterally 


Breasts: extensive erythema, nodularity chest wall right breast


Cor: RSR, No murmurs, No gallops


Abd: Soft, Normal bowel sounds, No organomegaly


Ext:No significant edema


Skin: No rashes, Integument intact


 Last Vital Signs











Temp Pulse Resp BP Pulse Ox


 


 98.0 F   90   23   126/61   99 


 


 05/30/18 14:00  05/30/18 14:00  05/30/18 14:00  05/30/18 14:00  05/30/18 08:00








 CBC, BMP





 05/30/18 06:00 





 05/30/18 06:00 





 Current Medications











Generic Name Dose Route Start Last Admin





  Trade Name Freq  PRN Reason Stop Dose Admin


 


Dexamethasone Sodium Phosphate  4 mg  05/30/18 00:00  05/30/18 11:47





  Decadron Injection -  IVPUSH   4 mg





  Q6H ANTONIO   Administration





     





     





     





     


 


Fentanyl  1 patch  06/01/18 10:30  





  Duragesic 50mcg Patch -  TD  06/05/18 10:22  





  Q72H ANTONIO   





     





     





     





     


 


Fentanyl  50 mcg  05/29/18 21:29  05/30/18 09:08





  Sublimaze Injection -  IVPUSH  05/30/18 21:28  50 mcg





  Q6H PRN   Administration





  PAIN   





     





     





     


 


Morphine Sulfate 100 mg/  100 mls @ 4 mls/hr  05/30/18 11:15  05/30/18 14:31





  Sodium Chloride  IVPB   5 mg/hr





  TITR ANTONIO   5 mls/hr





     Titration





     





  Protocol   





  4 MG/HR   


 


Lorazepam  1 mg  05/29/18 19:18  05/30/18 08:18





  Ativan Injection -  IVPUSH   1 mg





  Q6H PRN   Administration





  ANXIETY   





     





     





     


 


Miscellaneous  1 each  05/29/18 19:18  





  Duragesic Patch Waste  TD   





  PRN PRN   





  PAIN   





     





     





     


 


Miscellaneous  1 each  05/29/18 19:49  





  Duragesic Patch Waste  TD   





  PRN PRN   





  PAIN   





     





     





     


 


Scopolamine HBr  1 patch  05/30/18 13:00  05/30/18 14:33





  Transderm-Scop -  TD   1 patch





  Q72H ANTONIO   Administration





     





     





     





     











 


advanced TNBC. 


LMD





for hospice/comfort care


appreciate pal care

## 2018-05-30 NOTE — PN
Physical Exam: 


SUBJECTIVE: Patient seen and examined ; lethargic; labored breathing; oxygen 

saturation 85% on RA








OBJECTIVE:





 Vital Signs











 Period  Temp  Pulse  Resp  BP Sys/Zhao  Pulse Ox


 


 Last 24 Hr  97.5 F-98.4 F    18-27  110-177/  97-99











GENERAL: The patient is lethargic; sleeping/' 


LUNGS: clear; breathing labored with accessory muscle use


HEART: Regular rate and rhythm, S1, S2 without murmur, rub or gallop.


ABDOMEN: Soft, nontender, nondistended, normoactive bowel sounds,


EXTREMITIES: 2+ pulses, warm, well-perfused, no edema. 


Manjeet: lethargic not responsive




















 Laboratory Results - last 24 hr











  05/29/18 05/30/18 05/30/18





  17:27 05:20 06:00


 


WBC    24.3 H


 


RBC    3.52 L


 


Hgb    12.2


 


Hct    37.2


 


MCV    105.7 H


 


MCH    34.6 H


 


MCHC    32.7


 


RDW    18.1 H


 


Plt Count    190


 


MPV    7.4 L


 


Neutrophils %    90.0 H


 


Lymphocytes %    2.6 L D


 


Monocytes %    7.3


 


Eosinophils %    0.0


 


Basophils %    0.1


 


Nucleated RBC %    0


 


Sodium   


 


Potassium   


 


Chloride   


 


Carbon Dioxide   


 


Anion Gap   


 


BUN   


 


Creatinine   


 


Creat Clearance w eGFR   


 


POC Glucometer  [phone]  [phone] 


 


Random Glucose   


 


Calcium   


 


Phosphorus   


 


Magnesium   


 


Total Bilirubin   


 


AST   


 


ALT   


 


Alkaline Phosphatase   


 


Total Protein   


 


Albumin   














  05/30/18





  06:00


 


WBC 


 


RBC 


 


Hgb 


 


Hct 


 


MCV 


 


MCH 


 


MCHC 


 


RDW 


 


Plt Count 


 


MPV 


 


Neutrophils % 


 


Lymphocytes % 


 


Monocytes % 


 


Eosinophils % 


 


Basophils % 


 


Nucleated RBC % 


 


Sodium  129 L


 


Potassium  3.8


 


Chloride  92 L


 


Carbon Dioxide  26


 


Anion Gap  11


 


BUN  12


 


Creatinine  0.3 L


 


Creat Clearance w eGFR  > 60


 


POC Glucometer 


 


Random Glucose  144 H


 


Calcium  7.5 L


 


Phosphorus  2.9


 


Magnesium  1.9


 


Total Bilirubin  0.7


 


AST  27


 


ALT  74


 


Alkaline Phosphatase  110


 


Total Protein  5.9 L


 


Albumin  2.9 L








Active Medications











Generic Name Dose Route Start Last Admin





  Trade Name Freq  PRN Reason Stop Dose Admin


 


Dexamethasone Sodium Phosphate  4 mg  05/30/18 00:00  05/30/18 11:47





  Decadron Injection -  IVPUSH   4 mg





  Q6H ANTONIO   Administration





     





     





     





     


 


Fentanyl  1 patch  06/01/18 10:30  





  Duragesic 50mcg Patch -  TD  06/05/18 10:22  





  Q72H ANTONIO   





     





     





     





     


 


Fentanyl  50 mcg  05/29/18 21:29  05/30/18 09:08





  Sublimaze Injection -  IVPUSH  05/30/18 21:28  50 mcg





  Q6H PRN   Administration





  PAIN   





     





     





     


 


Morphine Sulfate 100 mg/  100 mls @ 4 mls/hr  05/30/18 11:15  05/30/18 14:31





  Sodium Chloride  IVPB   5 mg/hr





  TITR ANTONIO   5 mls/hr





     Titration





     





  Protocol   





  4 MG/HR   


 


Lorazepam  1 mg  05/29/18 19:18  05/30/18 14:50





  Ativan Injection -  IVPUSH   1 mg





  Q6H PRN   Administration





  ANXIETY   





     





     





     


 


Miscellaneous  1 each  05/29/18 19:18  





  Duragesic Patch Waste  TD   





  PRN PRN   





  PAIN   





     





     





     


 


Miscellaneous  1 each  05/29/18 19:49  





  Duragesic Patch Waste  TD   





  PRN PRN   





  PAIN   





     





     





     


 


Scopolamine HBr  1 patch  05/30/18 13:00  05/30/18 14:33





  Transderm-Scop -  TD   1 patch





  Q72H ANTONIO   Administration





     





     





     





     











ASSESSMENT/PLAN:


This is a 67 year old female with stage IV triple negative breast CA; 

leptomeningeal disease








Decision made for comfort care today; 


morphine drip started 




















Visit type





- Emergency Visit


Emergency Visit: Yes


ED Registration Date: 05/17/18


Care time: The patient presented to the Emergency Department on the above date 

and was hospitalized for further evaluation of their emergent condition.





- New Patient


This patient is new to me today: No





- Critical Care


Critical Care patient: Yes


Total Critical Care Time (in minutes): 35


Critical Care Statement: The care of this patient involved high complexity 

decision making to prevent further life threatening deterioration of the patient

's condition and/or to evaluate & treat vital organ system(s) failure or risk 

of failure.

## 2018-05-30 NOTE — PN
Progress Note, Physician


History of Present Illness: 





still lethargic


family considering further options of what to do





- Current Medication List


Current Medications: 


Active Medications





Dexamethasone Sodium Phosphate (Decadron Injection -)  4 mg IVPUSH Q6H [facility]


   Last Admin: 05/30/18 11:47 Dose:  4 mg


Fentanyl (Duragesic 50mcg Patch -)  1 patch TD Q72H [facility]


   Stop: 06/05/18 10:22


Fentanyl (Sublimaze Injection -)  50 mcg IVPUSH Q6H PRN


   PRN Reason: PAIN


   Stop: 05/30/18 21:28


   Last Admin: 05/30/18 09:08 Dose:  50 mcg


Morphine Sulfate 100 mg/ (Sodium Chloride)  100 mls @ 4 mls/hr IVPB TITR ANTONIO; 

Protocol


   Last Titration: 05/30/18 14:31 Dose:  5 mg/hr, 5 mls/hr


Lorazepam (Ativan Injection -)  1 mg IVPUSH Q6H PRN


   PRN Reason: ANXIETY


   Last Admin: 05/30/18 08:18 Dose:  1 mg


Miscellaneous (Duragesic Patch Waste)  1 each TD PRN PRN


   PRN Reason: PAIN


Miscellaneous (Duragesic Patch Waste)  1 each TD PRN PRN


   PRN Reason: PAIN


Scopolamine HBr (Transderm-Scop -)  1 patch TD Q72H [facility]


   Last Admin: 05/30/18 14:33 Dose:  1 patch











- Objective


Vital Signs: 


 Vital Signs











Temperature  98.0 F   05/30/18 14:00


 


Pulse Rate  90   05/30/18 14:00


 


Respiratory Rate  23   05/30/18 14:00


 


Blood Pressure  126/61   05/30/18 14:00


 


O2 Sat by Pulse Oximetry (%)  99   05/30/18 08:00











Constitutional: Yes: Other


Cardiovascular: Yes: Regular Rate and Rhythm


Respiratory: Yes: Regular, CTA Bilaterally


Gastrointestinal: Yes: Normal Bowel Sounds, Soft


Musculoskeletal: Yes: WNL


Extremities: Yes: WNL


Neurological: Yes: Other


Psychiatric: Yes: Other


Labs: 


 CBC, BMP





 05/30/18 06:00 





 05/30/18 06:00 





 INR, PTT











INR  1.05  (0.82-1.09)   05/24/18  05:25    














Assessment/Plan














Metastatic Breast Ca 


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


leukocytosis


electrolyte abn


uti


confusion





wbc still on the higher side





plan





post op from Ommaya shunt placement 


monitor closely


stopped abx


rest continue as per icu


monitor bp and mental status


further plan according to family











cc time 40 min

## 2018-05-30 NOTE — PN
Teaching Attending Note


Name of Resident: Melida Shannon





ATTENDING PHYSICIAN STATEMENT





I saw and evaluated the patient.


I reviewed the resident's note and discussed the case with the resident.


I agree with the resident's findings and plan as documented.








SUBJECTIVE:





Pt seen and examined in the ICU. Lethargic, more labored breathing today. 

Neurosurgery attempting to drain CSF from Ommaya.





OBJECTIVE:





 Vital Signs











 Period  Temp  Pulse  Resp  BP Sys/Zhao  Pulse Ox


 


 Last 24 Hr  97.5 F-98.8 F    18-27  115-177/  97-99








 Intake & Output











 05/27/18 05/28/18 05/29/18 05/30/18





 23:59 23:59 23:59 23:59


 


Intake Total 2250 2250 2160 1046


 


Output Total 1600 2350 1900 600


 


Balance 650 -100 260 446


 


Weight 90.775 kg 90.974 kg 92.108 kg 92.249 kg








Gen: lethargic, tachypneic


Heart: RRR


Lung: scattered rhonchi


Abd: soft, nontender


Ext: no edema





 CBC, BMP





 05/30/18 06:00 





 05/30/18 06:00 





Active Medications





Amlodipine Besylate (Norvasc -)  10 mg PO DAILY [facility]


   Last Admin: 05/30/18 09:11 Dose:  Not Given


Chlorhexidine Gluconate (Hibiclens For Decolonization -)  1 applic TP HS [facility]


   Last Admin: 05/29/18 22:53 Dose:  1 applic


Dexamethasone Sodium Phosphate (Decadron Injection -)  4 mg IVPUSH Q6H [facility]


   Last Admin: 05/30/18 06:14 Dose:  4 mg


Docusate Sodium (Colace -)  100 mg PO TID [facility]


   Last Admin: 05/30/18 05:11 Dose:  Not Given


Fentanyl (Duragesic 50mcg Patch -)  1 patch TD Q72H [facility]


   Stop: 06/05/18 10:22


Fentanyl (Sublimaze Injection -)  50 mcg IVPUSH Q6H PRN


   PRN Reason: PAIN


   Stop: 05/30/18 21:28


   Last Admin: 05/30/18 09:08 Dose:  50 mcg


Hydralazine HCl (Apresoline Injection -)  10 mg IVPUSH Q6H PRN


   PRN Reason: HYPERTENSION


   Last Admin: 05/30/18 08:19 Dose:  10 mg


Sodium Chloride (Normal Saline -)  1,000 mls @ 83 mls/hr IV ASDIR [facility]


   Last Admin: 05/30/18 06:16 Dose:  83 mls/hr


Piperacillin Sod/Tazobactam (Sod 3.375 gm/ Dextrose)  50 mls @ 100 mls/hr IVPB 

Q8H-IV [facility]; Protocol


   Last Admin: 05/30/18 09:12 Dose:  100 mls/hr


Morphine Sulfate 100 mg/ (Sodium Chloride)  100 mls @ 4 mls/hr IVPB TITR [facility]; 

Protocol


Insulin Aspart (Novolog Vial Sliding Scale -)  1 vial SQ BIDAC [facility]; Protocol


   Last Admin: 05/30/18 06:15 Dose:  2 units


Lorazepam (Ativan Injection -)  1 mg IVPUSH Q6H PRN


   PRN Reason: ANXIETY


   Last Admin: 05/30/18 08:18 Dose:  1 mg


Miscellaneous (Duragesic Patch Waste)  1 each TD PRN PRN


   PRN Reason: PAIN


Miscellaneous (Duragesic Patch Waste)  1 each TD PRN PRN


   PRN Reason: PAIN


Nystatin (Nystatin Oral Suspension -)  500,000 units PO Q6HPO [facility]


   Last Admin: 05/30/18 11:33 Dose:  Not Given


Prochlorperazine Edisylate (Compazine Injection -)  10 mg IVPB Q4H PRN


   PRN Reason: NAUSEA AND/OR VOMITING








ASSESSMENT AND PLAN:


Hypertensive Urgency


Metastatic Breast Ca 


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


Bacteruria


Hyponatremia





-  pain control


-  BP control


-  intrathecal chemo per onc


-  continue decadron


-  antiemetics


-  pain control


-  DVT prophylaxis


-  goals of care discussions ongoing


-  recommend comfort measures

## 2018-05-30 NOTE — PN
Progress Note, Physician


Chief Complaint: 





lethargic


Not taking PO meds now





- Current Medication List


Current Medications: 


Active Medications





Amlodipine Besylate (Norvasc -)  10 mg PO DAILY [facility]


Chlorhexidine Gluconate (Hibiclens For Decolonization -)  1 applic TP HS [facility]


   Last Admin: 05/29/18 22:53 Dose:  1 applic


Dexamethasone Sodium Phosphate (Decadron Injection -)  4 mg IVPUSH Q6H [facility]


   Last Admin: 05/30/18 06:14 Dose:  4 mg


Docusate Sodium (Colace -)  100 mg PO TID [facility]


   Last Admin: 05/30/18 05:11 Dose:  Not Given


Fentanyl (Duragesic 50mcg Patch -)  1 patch TD Q72H [facility]


   Stop: 06/05/18 10:22


Fentanyl (Sublimaze Injection -)  50 mcg IVPUSH Q6H PRN


   PRN Reason: PAIN


   Stop: 05/30/18 21:28


   Last Admin: 05/30/18 02:15 Dose:  50 mcg


Hydralazine HCl (Apresoline Injection -)  10 mg IVPUSH Q6H PRN


   PRN Reason: HYPERTENSION


   Last Admin: 05/30/18 01:20 Dose:  10 mg


Sodium Chloride (Normal Saline -)  1,000 mls @ 83 mls/hr IV ASDIR [facility]


   Last Admin: 05/30/18 06:16 Dose:  83 mls/hr


Piperacillin Sod/Tazobactam (Sod 3.375 gm/ Dextrose)  50 mls @ 100 mls/hr IVPB 

Q8H-IV [facility]; Protocol


   Last Admin: 05/30/18 01:50 Dose:  100 mls/hr


Insulin Aspart (Novolog Vial Sliding Scale -)  1 vial SQ BIDAC [facility]; Protocol


   Last Admin: 05/30/18 06:15 Dose:  2 units


Lorazepam (Ativan Injection -)  1 mg IVPUSH Q6H PRN


   PRN Reason: ANXIETY


   Last Admin: 05/29/18 19:36 Dose:  1 mg


Miscellaneous (Duragesic Patch Waste)  1 each TD PRN PRN


   PRN Reason: PAIN


Miscellaneous (Duragesic Patch Waste)  1 each TD PRN PRN


   PRN Reason: PAIN


Nystatin (Nystatin Oral Suspension -)  500,000 units PO Q6HPO [facility]


   Last Admin: 05/30/18 05:11 Dose:  Not Given


Prochlorperazine Edisylate (Compazine Injection -)  10 mg IVPB Q4H PRN


   PRN Reason: NAUSEA AND/OR VOMITING











- Objective


Vital Signs: 


 Vital Signs











Temperature  97.6 F   05/30/18 08:00


 


Pulse Rate  95 H  05/30/18 08:00


 


Respiratory Rate  23   05/30/18 08:00


 


Blood Pressure  168/72   05/30/18 08:00


 


O2 Sat by Pulse Oximetry (%)  97   05/29/18 20:31











Cardiovascular: Yes: Regular Rate and Rhythm


Respiratory: Yes: CTA Bilaterally


Gastrointestinal: Yes: Soft


Edema: No


Labs: 


 CBC, BMP





 05/29/18 05:30 





 05/29/18 05:30 





 INR, PTT











INR  1.05  (0.82-1.09)   05/24/18  05:25    














- ....Imaging


EKG: Image Reviewed





Assessment/Plan





MP:


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 

receiving intrathecal chemo


HTN





REC:


Hydralazine PRN to maintain BP < 150/90


DNR/DNI

## 2018-05-30 NOTE — PN
Progress Note (short form)





- Note


Progress Note: 





Subjective: The patient was seen at the bedside, she is unresponsive. 


Made comfort care this morning





  Current Medications











Generic Name Dose Route Start Last Admin





  Trade Name Josiah  PRN Reason Stop Dose Admin


 


Dexamethasone Sodium Phosphate  4 mg  05/30/18 00:00  05/30/18 11:47





  Decadron Injection -  IVPUSH   4 mg





  Q6H ANTONIO   Administration





     





     





     





     


 


Fentanyl  1 patch  06/01/18 10:30  





  Duragesic 50mcg Patch -  TD  06/05/18 10:22  





  Q72H ANTONIO   





     





     





     





     


 


Fentanyl  50 mcg  05/29/18 21:29  05/30/18 09:08





  Sublimaze Injection -  IVPUSH  05/30/18 21:28  50 mcg





  Q6H PRN   Administration





  PAIN   





     





     





     


 


Morphine Sulfate 100 mg/  100 mls @ 4 mls/hr  05/30/18 11:15  05/30/18 11:47





  Sodium Chloride  IVPB   4 mg/hr





  TITR ANTONIO   4 mls/hr





     Administration





     





  Protocol   





  4 MG/HR   


 


Lorazepam  1 mg  05/29/18 19:18  05/30/18 08:18





  Ativan Injection -  IVPUSH   1 mg





  Q6H PRN   Administration





  ANXIETY   





     





     





     


 


Miscellaneous  1 each  05/29/18 19:18  





  Duragesic Patch Waste  TD   





  PRN PRN   





  PAIN   





     





     





     


 


Miscellaneous  1 each  05/29/18 19:49  





  Duragesic Patch Waste  TD   





  PRN PRN   





  PAIN   





     





     





     


 


Scopolamine HBr  1 patch  05/30/18 13:00  





  Transderm-Scop -  TD   





  Q72H ANTONIO   





     





     





     





     








Objective: 


  


 Vital Signs











 Period  Temp  Pulse  Resp  BP Sys/Zhao  Pulse Ox


 


 Last 24 Hr  97.5 F-98.8 F    18-27  110-177/  97-99








Physical Exam: 


General: Unresponsive, appears comfortable, no grimacing 





 CBCD











WBC  24.3 K/mm3 (4.0-10.0)  H  05/30/18  06:00    


 


RBC  3.52 M/mm3 (3.60-5.2)  L  05/30/18  06:00    


 


Hgb  12.2 GM/dL (10.7-15.3)   05/30/18  06:00    


 


Hct  37.2 % (32.4-45.2)   05/30/18  06:00    


 


MCV  105.7 fl (80-96)  H  05/30/18  06:00    


 


MCHC  32.7 g/dl (32.0-36.0)   05/30/18  06:00    


 


RDW  18.1 % (11.6-15.6)  H  05/30/18  06:00    


 


Plt Count  190 K/MM3 (134-434)   05/30/18  06:00    


 


MPV  7.4 fl (7.5-11.1)  L  05/30/18  06:00    








 CMP











Sodium  129 mmol/L (136-145)  L  05/30/18  06:00    


 


Potassium  3.8 mmol/L (3.5-5.1)   05/30/18  06:00    


 


Chloride  92 mmol/L ()  L  05/30/18  06:00    


 


Carbon Dioxide  26 mmol/L (21-32)   05/30/18  06:00    


 


Anion Gap  11  (8-16)   05/30/18  06:00    


 


BUN  12 mg/dL (7-18)   05/30/18  06:00    


 


Creatinine  0.3 mg/dL (0.55-1.02)  L  05/30/18  06:00    


 


Creat Clearance w eGFR  > 60  (>60)   05/30/18  06:00    


 


Random Glucose  144 mg/dL ()  H  05/30/18  06:00    


 


Calcium  7.5 mg/dL (8.5-10.1)  L  05/30/18  06:00    


 


Total Bilirubin  0.7 mg/dL (0.2-1.0)   05/30/18  06:00    


 


AST  27 U/L (15-37)   05/30/18  06:00    


 


ALT  74 U/L (12-78)   05/30/18  06:00    


 


Alkaline Phosphatase  110 U/L ()   05/30/18  06:00    


 


Total Protein  5.9 g/dl (6.4-8.2)  L  05/30/18  06:00    


 


Albumin  2.9 g/dl (3.4-5.0)  L  05/30/18  06:00    








 CARDIAC ENZYMES











Creatine Kinase  66 IU/L ()   05/23/18  11:55    


 


Troponin I  < 0.02 ng/ml (0.00-0.05)   05/23/18  11:55    








 Microbiology





05/23/18 12:30   Blood - Peripheral Venous   Blood Culture - Final


                            NO GROWTH AFTER 5 DAYS INCUBATION


05/23/18 08:08   Blood - Karri Cath   Blood Culture - Final


                            NO GROWTH AFTER 5 DAYS INCUBATION


05/24/18 12:35   Cerebral Spinal Fluid - Ventricular Csf   Gram Stain - Final


05/24/18 12:35   Cerebral Spinal Fluid - Ventricular Csf   CSF Culture - Final


                            NO GROWTH AFTER 48 HOURS INCUBATION


05/23/18 12:15   Urine - Urine Clean Catch   Urine Culture - Final


                            Escherichia Coli


                            Pending Organism


05/18/18 08:30   Cerebral Spinal Fluid - Lumbar Puncture   Gram Stain - Final





Imaging: 


- MRI brain (5/14/18): There is faint focal linear-like increased T2 signal 

intensity in superior aspect of the right cerebellum that appears to be 

enhancing on the post contrast examination. There is also leptomeningeal 

enhancement within the superior cerebellar folia bilaterally. Leptomeningeal 

enchancement along posterior margin of the clivus as well as dural enhancement 

in the included portion of the upper cervical spine





Assessment: This is a 67 year old female with PMHx of osteoarthritis, GERD, 

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 

presented to the ED with headache, nausea, vertigo, and diarrhea/indigestion x1 

week. 





Plan: 


1) Metastatic right breast cancer, Leptomeningeal involvement


- S/p Ommaya with IT MXT 5/24, 5/28; repeat large volume LP at bedside on 5/26 

with improvement in mentation until 5/28. Pt no longer responsive to stimuli


- Patient made comfort care today


- Continue Decadron, Morphine gtt, Ativan prn, Fentanyl prn


- Appreciate oncology consult


- Appreciate neurosurgery consult


- Appreciate neurology consult





2) Hypertension


- Hydralazine prn





3) UTI, leukocytosis


- E.coli uti, 2nd organism pending


- Continue Zosyn for now, discussed with ID





4) Bradycardia, EKG changes


- Bradycardia resolved


- Appreciate cardiology consult





5) Hyponatremia


- Continue IV fluids





6) F/E/N:


- No further lab draws





7) Prophylaxis: 


- TEDs stockings





8) Dispo:


- Patient made comfort care today





CODE STATUS: DNR/DNI





Visit type





- Emergency Visit


Emergency Visit: Yes


ED Registration Date: 05/17/18


Care time: The patient presented to the Emergency Department on the above date 

and was hospitalized for further evaluation of their emergent condition.





- New Patient


This patient is new to me today: No





- Critical Care


Critical Care patient: No

## 2018-05-30 NOTE — PATH
Cytology Non-Gynecological Report



Patient Name:  ELINOR ROWLAND

Accession #:  

Med. Rec. #:  [id number]                                                        

   /Age/Gender:  1950 (Age: 67) / F

Account:  [id number]                                                          

             Location: ICU 

Taken:  2018

Received:  2018

Reported:  2018

Physicians:  Gabriel Verde M.D.

GIANNI Talbot M.D.

  



Specimen(s) Received

 CEREBRAL SPINAL FLUID 





Clinical History

Breast cancer, leptomeningeal disease







Final Diagnosis

CEREBROSPINAL FLUID FOR CYTOLOGY:

SATISFACTORY FOR EVALUATION.

POSITIVE FOR MALIGNANT CELLS.

CARCINOMA.

FEW ATYPICAL EPITHELIAL CELLS WITH MILD NUCLEAR ENLARGEMENT, ECCENTRIC NUCLEI

AND RARE INTRACYTOPLASMIC INCLUSIONS PRESENT.

SEE COMMENT.



Comment: Scant evidence. Immunohistochemical stains performed on cytospins

interpreted at [facility] show the tumor cells are positive

for cytokeratin AE1/3 and RODNEY-3 (rare). Cytomorphology and immunophenotype are

consistent with known history of breast carcinoma. Suggest clinical/radiologic

correlation. See cytology . Findings discussed with Dr. Sloan.





***Electronically Signed***

Dorene Kenney M.D.





Gross Description

Approximately 1 cc of clear fluid received fresh. Three cytofunnels prepared

## 2018-05-31 RX ADMIN — SODIUM CHLORIDE SCH MLS/HR: 9 INJECTION, SOLUTION INTRAVENOUS at 06:36

## 2018-05-31 RX ADMIN — DEXAMETHASONE SODIUM PHOSPHATE SCH MG: 4 INJECTION, SOLUTION INTRAMUSCULAR; INTRAVENOUS at 18:49

## 2018-05-31 RX ADMIN — SODIUM CHLORIDE SCH: 9 INJECTION, SOLUTION INTRAVENOUS at 11:15

## 2018-05-31 RX ADMIN — DEXAMETHASONE SODIUM PHOSPHATE SCH MG: 4 INJECTION, SOLUTION INTRAMUSCULAR; INTRAVENOUS at 12:00

## 2018-05-31 RX ADMIN — DEXAMETHASONE SODIUM PHOSPHATE SCH MG: 4 INJECTION, SOLUTION INTRAMUSCULAR; INTRAVENOUS at 05:11

## 2018-05-31 RX ADMIN — LORAZEPAM PRN MG: 2 INJECTION INTRAMUSCULAR; INTRAVENOUS at 21:15

## 2018-05-31 RX ADMIN — SODIUM CHLORIDE SCH MLS/HR: 9 INJECTION, SOLUTION INTRAVENOUS at 14:11

## 2018-05-31 RX ADMIN — LORAZEPAM PRN MG: 2 INJECTION INTRAMUSCULAR; INTRAVENOUS at 13:35

## 2018-05-31 RX ADMIN — LORAZEPAM PRN MG: 2 INJECTION INTRAMUSCULAR; INTRAVENOUS at 07:53

## 2018-05-31 NOTE — PN
Progress Note, Physician


History of Present Illness: 





patient on morphine drip


comfort measures now





- Current Medication List


Current Medications: 


Active Medications





Dexamethasone Sodium Phosphate (Decadron Injection -)  4 mg IVPUSH Q6H [facility]


   Last Admin: 05/31/18 12:00 Dose:  4 mg


Fentanyl (Duragesic 50mcg Patch -)  1 patch TD Q72H ANTONIO


   Stop: 06/05/18 10:22


Morphine Sulfate 100 mg/ (Sodium Chloride)  100 mls @ 4 mls/hr IVPB TITR ANTONIO; 

Protocol


   Last Admin: 05/31/18 14:11 Dose:  14 mg/hr, 14 mls/hr


Lorazepam (Ativan Injection -)  1 mg IVPUSH Q4H PRN


   PRN Reason: ANXIETY


   Last Admin: 05/31/18 13:35 Dose:  1 mg


Miscellaneous (Duragesic Patch Waste)  1 each TD PRN PRN


   PRN Reason: PAIN


Miscellaneous (Duragesic Patch Waste)  1 each TD PRN PRN


   PRN Reason: PAIN


Scopolamine HBr (Transderm-Scop -)  1 patch TD Q72H [facility]


   Last Admin: 05/30/18 14:33 Dose:  1 patch











- Objective


Vital Signs: 


 Vital Signs











Temperature  98.5 F   05/31/18 08:00


 


Pulse Rate  91 H  05/31/18 08:00


 


Respiratory Rate  10 L  05/31/18 08:00


 


Blood Pressure  129/61   05/31/18 08:00


 


O2 Sat by Pulse Oximetry (%)  93 L  05/31/18 08:00











Labs: 


 CBC, BMP





 05/30/18 06:00 





 05/30/18 06:00 





 INR, PTT











INR  1.05  (0.82-1.09)   05/24/18  05:25

## 2018-05-31 NOTE — PN
Progress Note (short form)





- Note


Progress Note: 








pt seen and examined. 





looks terminal








  Last Vital Signs











Temp Pulse Resp BP Pulse Ox


 


 98.5 F   91 H  10 L  129/61   93 L


 


 05/31/18 08:00  05/31/18 08:00  05/31/18 08:00  05/31/18 08:00  05/31/18 08:00








 CBC, BMP





 05/30/18 06:00 





 05/30/18 06:00 





 Current Medications











Generic Name Dose Route Start Last Admin





  Trade Name Freq  PRN Reason Stop Dose Admin


 


Dexamethasone Sodium Phosphate  4 mg  05/30/18 00:00  05/31/18 12:00





  Decadron Injection -  IVPUSH   4 mg





  Q6H ANTONIO   Administration





     





     





     





     


 


Fentanyl  1 patch  06/01/18 10:30  





  Duragesic 50mcg Patch -  TD  06/05/18 10:22  





  Q72H ANTONIO   





     





     





     





     


 


Morphine Sulfate 100 mg/  100 mls @ 4 mls/hr  05/30/18 11:15  05/31/18 13:30





  Sodium Chloride  IVPB   14 mg/hr





  TITR ANTONIO   14 mls/hr





     Titration





     





  Protocol   





  4 MG/HR   


 


Lorazepam  1 mg  05/30/18 20:28  05/31/18 13:35





  Ativan Injection -  IVPUSH   1 mg





  Q4H PRN   Administration





  ANXIETY   





     





     





     


 


Miscellaneous  1 each  05/29/18 19:18  





  Duragesic Patch Waste  TD   





  PRN PRN   





  PAIN   





     





     





     


 


Miscellaneous  1 each  05/29/18 19:49  





  Duragesic Patch Waste  TD   





  PRN PRN   





  PAIN   





     





     





     


 


Scopolamine HBr  1 patch  05/30/18 13:00  05/30/18 14:33





  Transderm-Scop -  TD   1 patch





  Q72H ANTONIO   Administration





     





     





     





     














 





for hospice/comfort care

## 2018-05-31 NOTE — PN
Teaching Attending Note


Name of Resident: Kajal Parkinson





ATTENDING PHYSICIAN STATEMENT





I saw and evaluated the patient.


I reviewed the resident's note and discussed the case with the resident.


I agree with the resident's findings and plan as documented.








SUBJECTIVE:





Pt seen and examined in the ICU. On morphine gtt, relatively comfortable.





OBJECTIVE:





 Vital Signs











 Period  Temp  Pulse  Resp  BP Sys/Zhao  Pulse Ox


 


 Last 24 Hr  97.7 F-98.5 F  88-97  9-23  122-147/51-70  93-95








 Intake & Output











 05/28/18 05/29/18 05/30/18 05/31/18





 23:59 23:59 23:59 23:59


 


Intake Total 2250 2160 1493.5 60


 


Output Total 2350 1900 1010 500


 


Balance -100 260 483.5 -440


 


Weight 90.974 kg 92.108 kg 92.249 kg 92.079 kg








Gen:  lethargic, mildly tachypneic


Heart: RRR


Lung: bilateral rhonchi


Abd: soft, nontender


Ext: no edema





 CBC, BMP





 05/30/18 06:00 





 05/30/18 06:00 





Active Medications





Dexamethasone Sodium Phosphate (Decadron Injection -)  4 mg IVPUSH Q6H ANTONIO


   Last Admin: 05/31/18 05:11 Dose:  4 mg


Fentanyl (Duragesic 50mcg Patch -)  1 patch TD Q72H ANTONIO


   Stop: 06/05/18 10:22


Morphine Sulfate 100 mg/ (Sodium Chloride)  100 mls @ 4 mls/hr IVPB TITR ANTONIO; 

Protocol


   Last Admin: 05/31/18 11:15 Dose:  Not Given


Lorazepam (Ativan Injection -)  1 mg IVPUSH Q4H PRN


   PRN Reason: ANXIETY


   Last Admin: 05/31/18 07:53 Dose:  1 mg


Miscellaneous (Duragesic Patch Waste)  1 each TD PRN PRN


   PRN Reason: PAIN


Miscellaneous (Duragesic Patch Waste)  1 each TD PRN PRN


   PRN Reason: PAIN


Scopolamine HBr (Transderm-Scop -)  1 patch TD Q72H ANTONIO


   Last Admin: 05/30/18 14:33 Dose:  1 patch








ASSESSMENT AND PLAN:


Hypertensive Urgency


Metastatic Breast Ca 


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


Bacteruria


Hyponatremia





-  continue supportive care


-  comfort measures, titrate morphine gtt as needed


-  ativan prn


-  scopolamine patch

## 2018-05-31 NOTE — PN
Progress Note (short form)





- Note


Progress Note: 





Comfort care


 2  


Physical Exam: 


General: Unresponsive, appears comfortable, no grimacing 





Assessment: This is a 67 year old female with PMHx of osteoarthritis, GERD, 

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 

presented to the ED with headache, nausea, vertigo, and diarrhea/indigestion x1 

week. 





Plan: 


1) Metastatic right breast cancer, Leptomeningeal involvement


- S/p Ommaya with IT MXT 5/24, 5/28; repeat large volume LP at bedside on 5/26 

with improvement in mentation until 5/28. Pt no longer responsive to stimuli


- Comfort care; no further lab draws


- Continue Decadron, Morphine gtt, Ativan prn, Fentanyl prn





CODE STATUS: DNR/DNI





Visit type





- Emergency Visit


Emergency Visit: Yes


ED Registration Date: 05/17/18


Care time: The patient presented to the Emergency Department on the above date 

and was hospitalized for further evaluation of their emergent condition.





- New Patient


This patient is new to me today: No





- Critical Care


Critical Care patient: No

## 2018-05-31 NOTE — PN
Physical Exam: 


SUBJECTIVE: Patient sleeping, not-arousable.  Family @ bedside.








OBJECTIVE:





 Vital Signs











 Period  Temp  Pulse  Resp  BP Sys/Zhao  Pulse Ox


 


 Last 24 Hr  97.7 F-98.5 F    9-27  110-147/51-70  93-95











GENERAL: Patient sleeping, difficult to arouse 


LUNGS: clear; breathing labored with accessory muscle use


HEART: Regular rate and rhythm, S1, S2 without murmur, rub or gallop.


ABDOMEN: Soft, nontender, nondistended, normoactive bowel sounds


EXTREMITIES: 2+ pulses, warm, well-perfused, no edema. 




















Active Medications











Generic Name Dose Route Start Last Admin





  Trade Name Freq  PRN Reason Stop Dose Admin


 


Dexamethasone Sodium Phosphate  4 mg  05/30/18 00:00  05/31/18 05:11





  Decadron Injection -  IVPUSH   4 mg





  Q6H ANTONIO   Administration





     





     





     





     


 


Fentanyl  1 patch  06/01/18 10:30  





  Duragesic 50mcg Patch -  TD  06/05/18 10:22  





  Q72H ANTONIO   





     





     





     





     


 


Morphine Sulfate 100 mg/  100 mls @ 4 mls/hr  05/30/18 11:15  05/31/18 07:30





  Sodium Chloride  IVPB   7 mg/hr





  TITR ANTONIO   7 mls/hr





     Titration





     





  Protocol   





  4 MG/HR   


 


Lorazepam  1 mg  05/30/18 20:28  05/31/18 07:53





  Ativan Injection -  IVPUSH   1 mg





  Q4H PRN   Administration





  ANXIETY   





     





     





     


 


Miscellaneous  1 each  05/29/18 19:18  





  Duragesic Patch Waste  TD   





  PRN PRN   





  PAIN   





     





     





     


 


Miscellaneous  1 each  05/29/18 19:49  





  Duragesic Patch Waste  TD   





  PRN PRN   





  PAIN   





     





     





     


 


Scopolamine HBr  1 patch  05/30/18 13:00  05/30/18 14:33





  Transderm-Scop -  TD   1 patch





  Q72H ANTONIO   Administration





     





     





     





     











ASSESSMENT/PLAN:


Patient is a 67 year old female with Stage IV triple negative Breast CA with 

leptomeningeal disease.  Patient is comfort care and DNR/DNI.





Morphine gtt


DNR/DNI


Comfort care





Visit type





- Emergency Visit


Emergency Visit: No





- New Patient


This patient is new to me today: No





- Critical Care


Critical Care patient: No

## 2018-06-01 VITALS — TEMPERATURE: 100.4 F

## 2018-06-01 RX ADMIN — LORAZEPAM PRN MG: 2 INJECTION INTRAMUSCULAR; INTRAVENOUS at 12:38

## 2018-06-01 RX ADMIN — LORAZEPAM PRN MG: 2 INJECTION INTRAMUSCULAR; INTRAVENOUS at 16:38

## 2018-06-01 RX ADMIN — SODIUM CHLORIDE SCH MLS/HR: 9 INJECTION, SOLUTION INTRAVENOUS at 08:07

## 2018-06-01 RX ADMIN — SODIUM CHLORIDE SCH MLS/HR: 9 INJECTION, SOLUTION INTRAVENOUS at 16:45

## 2018-06-01 RX ADMIN — SODIUM CHLORIDE SCH MLS/HR: 9 INJECTION, SOLUTION INTRAVENOUS at 21:08

## 2018-06-01 RX ADMIN — LORAZEPAM PRN MG: 2 INJECTION INTRAMUSCULAR; INTRAVENOUS at 09:37

## 2018-06-01 RX ADMIN — SODIUM CHLORIDE SCH MLS/HR: 9 INJECTION, SOLUTION INTRAVENOUS at 12:37

## 2018-06-01 RX ADMIN — DEXAMETHASONE SODIUM PHOSPHATE SCH MG: 4 INJECTION, SOLUTION INTRAMUSCULAR; INTRAVENOUS at 12:37

## 2018-06-01 RX ADMIN — SODIUM CHLORIDE SCH MLS/HR: 9 INJECTION, SOLUTION INTRAVENOUS at 03:14

## 2018-06-01 RX ADMIN — LORAZEPAM PRN MG: 2 INJECTION INTRAMUSCULAR; INTRAVENOUS at 21:09

## 2018-06-01 RX ADMIN — LORAZEPAM PRN MG: 2 INJECTION INTRAMUSCULAR; INTRAVENOUS at 06:13

## 2018-06-01 RX ADMIN — DEXAMETHASONE SODIUM PHOSPHATE SCH MG: 4 INJECTION, SOLUTION INTRAMUSCULAR; INTRAVENOUS at 18:03

## 2018-06-01 RX ADMIN — DEXAMETHASONE SODIUM PHOSPHATE SCH MG: 4 INJECTION, SOLUTION INTRAMUSCULAR; INTRAVENOUS at 06:13

## 2018-06-01 RX ADMIN — DEXAMETHASONE SODIUM PHOSPHATE SCH MG: 4 INJECTION, SOLUTION INTRAMUSCULAR; INTRAVENOUS at 00:25

## 2018-06-01 NOTE — PN
Physical Exam: 


SUBJECTIVE: Patient sleeping, arousable and lethargic.








OBJECTIVE:





GENERAL: Patient sleeping, difficult to arouse 


LUNGS: clear; breathing labored without accessory muscle use, scattered rhonchi 

B/L


HEART: Regular rate and rhythm, S1, S2 without murmur, rub or gallop.


ABDOMEN: Soft, nontender, nondistended, hypoactive bowel sounds


EXTREMITIES: 2+ pulses, warm, well-perfused, no edema. 


























 Vital Signs











 Period  Temp  Pulse  Resp  BP Sys/Zhao  Pulse Ox


 


 Last 24 Hr  97.6 F-99.0 F    7-14  118-127/54-61  93




















Active Medications











Generic Name Dose Route Start Last Admin





  Trade Name Freq  PRN Reason Stop Dose Admin


 


Dexamethasone Sodium Phosphate  4 mg  05/30/18 00:00  06/01/18 06:13





  Decadron Injection -  IVPUSH   4 mg





  Q6H ANTONIO   Administration





     





     





     





     


 


Fentanyl  1 patch  06/01/18 10:30  





  Duragesic 50mcg Patch -  TD  06/05/18 10:22  





  Q72H ANTONIO   





     





     





     





     


 


Morphine Sulfate 100 mg/  100 mls @ 4 mls/hr  05/30/18 11:15  06/01/18 08:07





  Sodium Chloride  IVPB   20 mg/hr





  TITR ANTONIO   20 mls/hr





     Administration





     





  Protocol   





  4 MG/HR   


 


Lorazepam  1 mg  05/30/18 20:28  06/01/18 06:13





  Ativan Injection -  IVPUSH   1 mg





  Q4H PRN   Administration





  ANXIETY   





     





     





     


 


Miscellaneous  1 each  05/29/18 19:18  





  Duragesic Patch Waste  TD   





  PRN PRN   





  PAIN   





     





     





     


 


Miscellaneous  1 each  05/29/18 19:49  





  Duragesic Patch Waste  TD   





  PRN PRN   





  PAIN   





     





     





     


 


Scopolamine HBr  1 patch  05/30/18 13:00  05/30/18 14:33





  Transderm-Scop -  TD   1 patch





  Q72H ANTONIO   Administration





     





     





     





     











ASSESSMENT/PLAN:


Patient is a 67 year old female with Stage IV triple negative Breast CA with 

leptomeningeal disease.  Patient is comfort care and DNR/DNI.





Morphine gtt


DNR/DNI


Comfort care





Visit type





- Emergency Visit


Emergency Visit: No





- New Patient


This patient is new to me today: No





- Critical Care


Critical Care patient: No

## 2018-06-01 NOTE — PN
Teaching Attending Note


Name of Resident: Kajal Parkinson





ATTENDING PHYSICIAN STATEMENT





I saw and evaluated the patient.


I reviewed the resident's note and discussed the case with the resident.


I agree with the resident's findings and plan as documented.








SUBJECTIVE:


Pt seen and examined in the ICU. 


On morphine drip, appears comfortable and in NAD. 








OBJECTIVE:


 Intake & Output











 05/29/18 05/30/18 05/31/18 06/01/18





 23:59 23:59 23:59 23:59


 


Intake Total 2160 1493.5 272.5 126


 


Output Total 1900 1010 700 100


 


Balance 260 483.5 -427.5 26


 


Weight 203 lb 1 oz 203 lb 6 oz 203 lb 








 Last Vital Signs











Temp Pulse Resp BP Pulse Ox


 


 99.0 F   102 H  14   122/54   93 L


 


 06/01/18 06:00  06/01/18 07:13  06/01/18 07:13  06/01/18 07:13  05/31/18 20:19








Active Medications





Dexamethasone Sodium Phosphate (Decadron Injection -)  4 mg IVPUSH Q6H [facility]


   Last Admin: 06/01/18 06:13 Dose:  4 mg


Fentanyl (Duragesic 50mcg Patch -)  1 patch TD Q72H [facility]


   Stop: 06/05/18 10:22


   Last Admin: 06/01/18 09:38 Dose:  1 patch


Morphine Sulfate 100 mg/ (Sodium Chloride)  100 mls @ 4 mls/hr IVPB TITR ANTONIO; 

Protocol


   Last Admin: 06/01/18 08:07 Dose:  20 mg/hr, 20 mls/hr


Lorazepam (Ativan Injection -)  1 mg IVPUSH Q4H PRN


   PRN Reason: ANXIETY


   Last Admin: 06/01/18 09:37 Dose:  1 mg


Miscellaneous (Duragesic Patch Waste)  1 each TD PRN PRN


   PRN Reason: PAIN


Miscellaneous (Duragesic Patch Waste)  1 each TD PRN PRN


   PRN Reason: PAIN


Scopolamine HBr (Transderm-Scop -)  1 patch TD Q72H [facility]


   Last Admin: 05/30/18 14:33 Dose:  1 patch








Gen:  lethargic, mildly tachypneic


Heart: RRR


Lung: bilateral rhonchi


Abd: soft, nontender


Ext: no edema





 


ASSESSMENT AND PLAN:


Hypertensive Urgency


Metastatic Breast Ca 


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


Bacteruria


Hyponatremia





-  continue supportive care


-  comfort measures, titrate morphine drip as needed


-  ativan prn





Dr Ellis

## 2018-06-02 VITALS — HEART RATE: 130 BPM

## 2018-06-02 VITALS — SYSTOLIC BLOOD PRESSURE: 108 MMHG | DIASTOLIC BLOOD PRESSURE: 51 MMHG

## 2018-06-02 RX ADMIN — DEXAMETHASONE SODIUM PHOSPHATE SCH MG: 4 INJECTION, SOLUTION INTRAMUSCULAR; INTRAVENOUS at 00:10

## 2018-06-02 RX ADMIN — DEXAMETHASONE SODIUM PHOSPHATE SCH MG: 4 INJECTION, SOLUTION INTRAMUSCULAR; INTRAVENOUS at 06:36

## 2018-06-02 RX ADMIN — SODIUM CHLORIDE SCH MLS/HR: 9 INJECTION, SOLUTION INTRAVENOUS at 06:36

## 2018-06-02 NOTE — PN
Progress Note (short form)





- Note


Progress Note: 





Death pronounced by ICU NP. 


Death certificate registered in [facility] system online (physician acting on behalf 

of attending physician).

## 2018-06-02 NOTE — PN
Progress Note (short form)





- Note


Progress Note: 





NEUROSURGERY 





POD #9





In ICU


Pt passed away peacefully


Family at bedside


Condolences offered
